# Patient Record
Sex: FEMALE | Race: WHITE | NOT HISPANIC OR LATINO | Employment: OTHER | URBAN - METROPOLITAN AREA
[De-identification: names, ages, dates, MRNs, and addresses within clinical notes are randomized per-mention and may not be internally consistent; named-entity substitution may affect disease eponyms.]

---

## 2017-04-02 ENCOUNTER — HOSPITAL ENCOUNTER (EMERGENCY)
Facility: HOSPITAL | Age: 67
Discharge: HOME | End: 2017-04-03
Attending: EMERGENCY MEDICINE
Payer: MEDICARE

## 2017-04-02 DIAGNOSIS — N39.0 URINARY TRACT INFECTION WITH HEMATURIA, SITE UNSPECIFIED: Primary | ICD-10-CM

## 2017-04-02 DIAGNOSIS — R31.9 URINARY TRACT INFECTION WITH HEMATURIA, SITE UNSPECIFIED: Primary | ICD-10-CM

## 2017-04-02 LAB
BACTERIA #/AREA URNS HPF: ABNORMAL /HPF
BILIRUB UR QL STRIP: NEGATIVE
CLARITY UR: ABNORMAL
COLOR UR: YELLOW
GLUCOSE UR QL STRIP: ABNORMAL
HGB UR QL STRIP: ABNORMAL
HYALINE CASTS #/AREA URNS LPF: 0 /LPF
KETONES UR QL STRIP: NEGATIVE
LEUKOCYTE ESTERASE UR QL STRIP: ABNORMAL
MICROSCOPIC COMMENT: ABNORMAL
NITRITE UR QL STRIP: POSITIVE
PH UR STRIP: 6 [PH] (ref 5–8)
PROT UR QL STRIP: ABNORMAL
RBC #/AREA URNS HPF: 20 /HPF (ref 0–4)
SP GR UR STRIP: 1.02 (ref 1–1.03)
URN SPEC COLLECT METH UR: ABNORMAL
UROBILINOGEN UR STRIP-ACNC: 1 EU/DL
WBC #/AREA URNS HPF: >100 /HPF (ref 0–5)

## 2017-04-02 PROCEDURE — 96372 THER/PROPH/DIAG INJ SC/IM: CPT

## 2017-04-02 PROCEDURE — 81000 URINALYSIS NONAUTO W/SCOPE: CPT

## 2017-04-02 PROCEDURE — 99283 EMERGENCY DEPT VISIT LOW MDM: CPT | Mod: 25

## 2017-04-02 NOTE — ED AVS SNAPSHOT
[facility] -   [address] [facility] [address]               Violeta Scherer   2017 11:20 PM   ED    Description:  Female : 1950   Department:  [facility] -            Your Care was Coordinated By:     Provider Role From To    Davis Bahena Jr., MD Attending Provider [id number] --      Reason for Visit     Dysuria           Diagnoses this Visit        Comments    Urinary tract infection with hematuria, site unspecified    -  Primary       ED Disposition     ED Disposition Condition Comment    Discharge             To Do List           Follow-up Information     Follow up with Karrie Birmingham MD. Call in 2 days.    Specialty:  Family Medicine    Contact information:    [address]  [facility] [address] [phone]         These Medications        Disp Refills Start End    ciprofloxacin HCl (CIPRO) 500 MG tablet 20 tablet 0 4/3/2017 2017    Take 1 tablet (500 mg total) by mouth 2 (two) times daily. - Oral      [facility]     [facility] -  Assistance  Unless otherwise directed by your provider, please contact [facility], our nurse care line that is available for  assistance.     Registered nurses in the [facility] provide: appointment scheduling, clinical advisement, health education, and other advisory services.  Call: [phone] (toll free)               Medications           Message regarding Medications     Verify the changes and/or additions to your medication regime listed below are the same as discussed with your clinician today.  If any of these changes or additions are incorrect, please notify your healthcare provider.        START taking these NEW medications        Refills    ciprofloxacin HCl (CIPRO) 500 MG tablet 0    Sig: Take 1 tablet (500 mg total) by mouth 2 (two) times daily.    Class: Print    Route: Oral      These medications were administered  "today        Dose Freq    cefTRIAXone injection 1 g 1 g ED 1 Time    Sig: Inject 1 g into the muscle ED 1 Time.    Class: Normal    Route: Intramuscular    lidocaine HCL 10 mg/ml (1%) injection 1 mL 1 mL ED 1 Time    Sig: Inject 1 mL into the muscle ED 1 Time.    Class: Normal    Route: Intramuscular           Verify that the below list of medications is an accurate representation of the medications you are currently taking.  If none reported, the list may be blank. If incorrect, please contact your healthcare provider. Carry this list with you in case of emergency.           Current Medications     cefTRIAXone injection 1 g Inject 1 g into the muscle ED 1 Time.    ciprofloxacin HCl (CIPRO) 500 MG tablet Take 1 tablet (500 mg total) by mouth 2 (two) times daily.    lidocaine HCL 10 mg/ml (1%) injection 1 mL Inject 1 mL into the muscle ED 1 Time.           Clinical Reference Information           Your Vitals Were     BP Pulse Temp Resp Height Weight    143/70 (BP Location: Right arm, Patient Position: Sitting) 82 98.4 °F (36.9 °C) 18 5' 5" (1.651 m) 57.6 kg (127 lb)    SpO2 BMI             96% 21.13 kg/m2         Allergies as of 4/3/2017        Reactions    Bactrim [Sulfamethoxazole-trimethoprim] Hives      Immunizations Administered on Date of Encounter - 4/3/2017     None      ED Micro, Lab, POCT     Start Ordered       Status Ordering Provider    04/02/17 2327 04/02/17 2326  Urinalysis  STAT      Final result     04/02/17 2326 04/02/17 2326  Urinalysis Microscopic  Once      Final result       ED Imaging Orders     None        Discharge Instructions         Bladder Infection, Female (Adult)    Urine is normally doesn't have any bacteria in it. But bacteria can get into the urinary tract from the skin around the rectum. Or they can travel in the blood from elsewhere in the body. Once they are in your urinary tract, they can cause infection in the urethra (urethritis), the bladder (cystitis), or the kidneys " "(pyelonephritis).  The most common place for an infection is in the bladder. This is called a bladder infection. This is one of the most common infections in women. Most bladder infections are easily treated. They are not serious unless the infection spreads to the kidney.  The phrases "bladder infection," "UTI," and "cystitis" are often used to describe the same thing. But they are not always the same. Cystitis is an inflammation of the bladder. The most common cause of cystitis is an infection.  Symptoms  The infection causes inflammation in the urethra and bladder. This causes many of the symptoms. The most common symptoms of a bladder infection are:  · Pain or burning when urinating  · Having to urinate more often than usual  · Urgent need to urinate  · Only a small amount of urine comes out  · Blood in urine  · Abdominal discomfort. This is usually in the lower abdomen above the pubic bone.  · Cloudy urine  · Strong- or bad-smelling urine  · Unable to urinate (urinary retention)  · Unable to hold urine in (urinary incontinence)  · Fever  · Loss of appetite  · Confusion (in older adults)  Causes  Bladder infections are not contagious. You can't get one from someone else, from a toilet seat, or from sharing a bath.  The most common cause of bladder infections is bacteria from the bowels. The bacteria get onto the skin around the opening of the urethra. From there, they can get into the urine and travel up to the bladder, causing inflammation and infection. This usually happens because of:  · Wiping improperly after urinating. Always wipe from front to back.  · Bowel incontinence  · Pregnancy  · Procedures such as having a catheter inserted  · Older age  · Not emptying your bladder. This can allow bacteria a chance to grow in your urine.  · Dehydration  · Constipation  · Sex  · Use of a diaphragm for birth control   Treatment  Bladder infections are diagnosed by a urine test. They are treated with antibiotics and " usually clear up quickly without complications. Treatment helps prevent a more serious kidney infection.  Medicines  Medicines can help in the treatment of a bladder infection:  · Take antibiotics until they are used up, even if you feel better. It is important to finish them to make sure the infection has cleared.  · You can use acetaminophen or ibuprofen for pain, fever, or discomfort, unless another medicine was prescribed. If you have chronic liver or kidney disease, talk with your healthcare provider before using these medicines. Also talk with your provider if you've ever had a stomach ulcer or gastrointestinal bleeding, or are taking blood-thinner medicines.  · If you are given phenazopydridine to reduce burning with urination, it will cause your urine to become a bright orange color. This can stain clothing.  Care and prevention  These self-care steps can help prevent future infections:  · Drink plenty of fluids to prevent dehydration and flush out your bladder. Do this unless you must restrict fluids for other health reasons, or your doctor told you not to.  · Proper cleaning after going to the bathroom is important. Wipe from front to back after using the toilet to prevent the spread of bacteria.  · Urinate more often. Don't try to hold urine in for a long time.  · Wear loose-fitting clothes and cotton underwear. Avoid tight-fitting pants.  · Improve your diet and prevent constipation. Eat more fresh fruit and vegetables, and fiber, and less junk and fatty foods.  · Avoid sex until your symptoms are gone.  · Avoid caffeine, alcohol, and spicy foods. These can irritate your bladder.  · Urinate right after intercourse to flush out your bladder.  · If you use birth control pills and have frequent bladder infections, discuss it with your doctor.  Follow-up care  Call your healthcare provider if all symptoms are not gone after 3 days of treatment. This is especially important if you have repeat infections.  If  a culture was done, you will be told if your treatment needs to be changed. If directed, you can call to find out the results.  If X-rays were done, you will be told if the results will affect your treatment.  Call 911  Call 911 if any of the following occur:  · Trouble breathing  · Hard to wake up or confusion  · Fainting or loss of consciousness  · Rapid heart rate  When to seek medical advice  Call your healthcare provider right away if any of these occur:  · Fever of 100.4ºF (38.0ºC) or higher, or as directed by your healthcare provider  · Symptoms are not better by the third day of treatment  · Back or belly (abdominal) pain that gets worse  · Repeated vomiting, or unable to keep medicine down  · Weakness or dizziness  · Vaginal discharge  · Pain, redness, or swelling in the outer vaginal area (labia)  Date Last Reviewed: 10/1/2016  © 4257-0334 [employer]. [address] [place] [address]. All rights reserved. This information is not intended as a substitute for professional medical care. Always follow your healthcare professional's instructions.          [facility] Sign-Up     Activating your [facility] account is as easy as 1-2-3!     1) Visit [email or web address], select Sign Up Now, enter this activation code and your date of birth, then select Next.  [email or web address]  Expires: 5/18/2017 12:09 AM      2) Create a username and password to use when you visit [facility] in the future and select a security question in case you lose your password and select Next.    3) Enter your e-mail address and click Sign Up!    Additional Information  If you have questions, please e-mail [email or web address] or call [phone] to talk to our [facility] staff. Remember, [facility] is NOT to be used for urgent needs. For medical emergencies, dial 911.          [facility] complies with applicable Federal civil rights laws and does not discriminate on the basis of race, color, national origin,  age, disability, or sex.        Language Assistance Services     ATTENTION: Language assistance services are available, free of charge. Please call [phone].      ATENCIÓN: Si habla bhupinderañol, tiene a kessler disposición servicios gratuitos de asistencia lingüística. Llame al [phone].     CHÚ Ý: N?u b?n nói Ti?ng Vi?t, có các d?ch v? h? tr? ngôn ng? mi?n phí dành cho b?n. G?i s? [phone].

## 2017-04-03 VITALS
HEIGHT: 65 IN | OXYGEN SATURATION: 96 % | WEIGHT: 127 LBS | RESPIRATION RATE: 16 BRPM | TEMPERATURE: 97 F | DIASTOLIC BLOOD PRESSURE: 84 MMHG | BODY MASS INDEX: 21.16 KG/M2 | HEART RATE: 73 BPM | SYSTOLIC BLOOD PRESSURE: 193 MMHG

## 2017-04-03 PROCEDURE — 25000003 PHARM REV CODE 250: Performed by: EMERGENCY MEDICINE

## 2017-04-03 PROCEDURE — 63600175 PHARM REV CODE 636 W HCPCS: Performed by: EMERGENCY MEDICINE

## 2017-04-03 RX ORDER — NITROFURANTOIN 25; 75 MG/1; MG/1
100 CAPSULE ORAL 2 TIMES DAILY
Qty: 10 CAPSULE | Refills: 0 | Status: SHIPPED | OUTPATIENT
Start: 2017-04-03 | End: 2017-04-08

## 2017-04-03 RX ORDER — LIDOCAINE HYDROCHLORIDE 10 MG/ML
1 INJECTION INFILTRATION; PERINEURAL
Status: COMPLETED | OUTPATIENT
Start: 2017-04-03 | End: 2017-04-03

## 2017-04-03 RX ORDER — CEFTRIAXONE 1 G/1
1 INJECTION, POWDER, FOR SOLUTION INTRAMUSCULAR; INTRAVENOUS
Status: COMPLETED | OUTPATIENT
Start: 2017-04-03 | End: 2017-04-03

## 2017-04-03 RX ORDER — CIPROFLOXACIN 500 MG/1
500 TABLET ORAL 2 TIMES DAILY
Qty: 20 TABLET | Refills: 0 | Status: SHIPPED | OUTPATIENT
Start: 2017-04-03 | End: 2017-04-03

## 2017-04-03 RX ADMIN — LIDOCAINE HYDROCHLORIDE 1 ML: 10 INJECTION, SOLUTION INFILTRATION; PERINEURAL at 12:04

## 2017-04-03 RX ADMIN — CEFTRIAXONE SODIUM 1 G: 1 INJECTION, POWDER, FOR SOLUTION INTRAMUSCULAR; INTRAVENOUS at 12:04

## 2017-04-03 NOTE — DISCHARGE INSTRUCTIONS

## 2017-04-03 NOTE — ED PROVIDER NOTES
"SCRIBE #1 NOTE: I, Roberto Lobo, am scribing for, and in the presence of, Davis Bahena Jr., MD. I have scribed the entire note.      History      Chief Complaint   Patient presents with    Dysuria     burning with urination, c/o pressure       Review of patient's allergies indicates:  No Known Allergies     HPI   HPI    4/2/2017, 11:34 PM   History obtained from the patient      History of Present Illness: Violeta Scherer is a 66 y.o. female patient who presents to the Emergency Department for an evaluation of dysuria which onset suddenly today. Symptoms are intermittent and moderate in severity. Exacerbated by nothing and relieved by nothing. Pt reports she does not suffer from pain with urination, but instead a "pressure." Patient denies any fever, chills, diaphoresis, SOB, cough, CP, abd pain, nausea, vomiting, diarrhea, hematuria, decreased urination, difficulty urinating, HA, weakness, back pain, neck pain, flank pain, urinary urgency/frequency, and all other sxs at this time. No further complaints or concerns at this time.     Arrival mode: Personal vehicle    PCP: Karrie Birmingham MD     Past Medical History:  Past medical history reviewed not relevant      Past Surgical History:  Past surgical history reviewed not relevant      Family History:  Family history reviewed not relevant      Social History:  Social History    Social History Main Topics    Social History Main Topics    Smoking status: Unknown if ever smoked    Smokeless tobacco: Unknown if ever used    Alcohol Use: Unknown drinking history    Drug Use: Unknown if ever used    Sexual Activity: Unknown       ROS   Review of Systems   Constitutional: Negative for chills, diaphoresis and fever.   HENT: Negative for sore throat.    Respiratory: Negative for cough and shortness of breath.    Cardiovascular: Negative for chest pain.   Gastrointestinal: Negative for abdominal pain, diarrhea, nausea and vomiting.   Genitourinary: Positive for dysuria " "(Pressure). Negative for decreased urine volume, difficulty urinating, flank pain, frequency, hematuria and urgency.   Musculoskeletal: Negative for back pain and neck pain.   Skin: Negative for rash.   Neurological: Negative for weakness and headaches.   Hematological: Does not bruise/bleed easily.     Physical Exam    Initial Vitals   BP Pulse Resp Temp SpO2   04/02/17 2308 04/02/17 2308 04/02/17 2308 04/02/17 2308 04/02/17 2308   143/70 82 18 98.4 °F (36.9 °C) 96 %      Physical Exam  Nursing Notes and Vital Signs Reviewed.  Constitutional: Patient is in no acute distress. Awake and alert. Well-developed and well-nourished.  Head: Atraumatic. Normocephalic.  Eyes: PERRL. EOM intact. Conjunctivae are not pale. No scleral icterus.  ENT: Mucous membranes are moist. Oropharynx is clear and symmetric.    Neck: Supple. Full ROM. No lymphadenopathy.  Cardiovascular: Regular rate. Regular rhythm.  Pulmonary/Chest: No respiratory distress. Clear to auscultation bilaterally. No wheezing, rales, or rhonchi.  Abdominal: Soft and non-distended.  Suprapubic tenderness.  No pain to palpation noted. No rebound, guarding, or rigidity.  Musculoskeletal: Moves all extremities. No obvious deformities.  Skin: Warm and dry.  Neurological:  Alert, awake, and appropriate.  Normal speech.  No acute focal neurological deficits are appreciated.  Psychiatric: Normal affect. Good eye contact. Appropriate in content.    ED Course    Procedures  ED Vital Signs:  Vitals:    04/02/17 2308 04/03/17 0059   BP: (!) 143/70 (!) 193/84   Pulse: 82 73   Resp: 18 16   Temp: 98.4 °F (36.9 °C) 97.2 °F (36.2 °C)   TempSrc:  Oral   SpO2: 96%    Weight: 57.6 kg (127 lb)    Height: 5' 5" (1.651 m)        Abnormal Lab Results:  Labs Reviewed   URINALYSIS - Abnormal; Notable for the following:        Result Value    Appearance, UA Cloudy (*)     Protein, UA 2+ (*)     Glucose, UA Trace (*)     Occult Blood UA 2+ (*)     Nitrite, UA Positive (*)     Leukocytes, " UA 3+ (*)     All other components within normal limits   URINALYSIS MICROSCOPIC - Abnormal; Notable for the following:     RBC, UA 20 (*)     WBC, UA >100 (*)     Bacteria, UA Many (*)     All other components within normal limits        All Lab Results:  Results for orders placed or performed during the hospital encounter of 04/02/17   Urinalysis   Result Value Ref Range    Specimen UA Urine, Clean Catch     Color, UA Yellow Yellow, Straw, [place]    Appearance, UA Cloudy (A) Clear    pH, UA 6.0 5.0 - 8.0    Specific Gravity, UA 1.020 1.005 - 1.030    Protein, UA 2+ (A) Negative    Glucose, UA Trace (A) Negative    Ketones, UA Negative Negative    Bilirubin (UA) Negative Negative    Occult Blood UA 2+ (A) Negative    Nitrite, UA Positive (A) Negative    Urobilinogen, UA 1.0 <2.0 EU/dL    Leukocytes, UA 3+ (A) Negative   Urinalysis Microscopic   Result Value Ref Range    RBC, UA 20 (H) 0 - 4 /hpf    WBC, UA >100 (H) 0 - 5 /hpf    Bacteria, UA Many (A) None-Occ /hpf    Hyaline Casts, UA 0 0-1/lpf /lpf    Microscopic Comment SEE COMMENT                   The Emergency Provider reviewed the vital signs and test results, which are outlined above.    ED Discussion      12:19 PM: Reassessed pt at this time. Pt is awake, alert, and in NAD. Pt states her condition has improved at this time. Discussed with pt all pertinent ED information and results. Discussed pt dx of urinary tract infection with hematuria and plan of tx. Gave pt all f/u and return to the ED instructions. All questions and concerns were addressed at this time. Pt expresses understanding of information and instructions, and is comfortable with plan to discharge. Pt is stable for discharge.    I discussed with patient and/or family/caretaker that evaluation in the ED does not suggest any emergent or life threatening medical conditions requiring immediate intervention beyond what was provided in the ED, and I believe patient is safe for discharge.  Regardless,  an unremarkable evaluation in the ED does not preclude the development or presence of a serious of life threatening condition. As such, patient was instructed to return immediately for any worsening or change in current symptoms.    Pre-hypertension/Hypertension: The pt has been informed that they may have pre-hypertension or hypertension based on a blood pressure reading in the ED. I recommend that the pt call the PCP listed on their discharge instructions or a physician of their choice this week to arrange f/u for further evaluation of possible pre-hypertension or hypertension.       ED Medication(s):  Medications   cefTRIAXone injection 1 g (1 g Intramuscular Given 4/3/17 0030)   lidocaine HCL 10 mg/ml (1%) injection 1 mL (1 mL Intramuscular Given 4/3/17 0030)       Discharge Medication List as of 4/3/2017 12:09 AM      START taking these medications    Details   ciprofloxacin HCl (CIPRO) 500 MG tablet Take 1 tablet (500 mg total) by mouth 2 (two) times daily., Starting 4/3/2017, Until u 4/13/17, Print             Follow-up Information     Follow up with Karrie Birmingham MD. Call in 2 days.    Specialty:  Family Medicine    Contact information:    [facility]  [place] [address]  [phone]              Medical Decision Making    Medical Decision Making:   Clinical Tests:   Lab Tests: Ordered and Reviewed           Scribe Attestation:   Scribe #1: I performed the above scribed service and the documentation accurately describes the services I performed. I attest to the accuracy of the note.    Attending:   Physician Attestation Statement for Scribe #1: I, Davis Bahena Jr., MD, personally performed the services described in this documentation, as scribed by Roberto Lobo, in my presence, and it is both accurate and complete.          Clinical Impression       ICD-10-CM ICD-9-CM   1. Urinary tract infection with hematuria, site unspecified N39.0 599.0    R31.9        Disposition:   Disposition:  Discharged  Condition: Stable         Davis Bahena Jr., MD  04/03/17 0419

## 2018-11-24 ENCOUNTER — HOSPITAL ENCOUNTER (EMERGENCY)
Facility: HOSPITAL | Age: 68
Discharge: HOME | End: 2018-11-24
Attending: EMERGENCY MEDICINE
Payer: MEDICARE

## 2018-11-24 VITALS
SYSTOLIC BLOOD PRESSURE: 144 MMHG | WEIGHT: 152 LBS | TEMPERATURE: 98 F | BODY MASS INDEX: 25.29 KG/M2 | DIASTOLIC BLOOD PRESSURE: 75 MMHG | OXYGEN SATURATION: 94 % | HEART RATE: 76 BPM | RESPIRATION RATE: 20 BRPM

## 2018-11-24 DIAGNOSIS — S32.000A LUMBAR COMPRESSION FRACTURE, CLOSED, INITIAL ENCOUNTER: Primary | ICD-10-CM

## 2018-11-24 DIAGNOSIS — R10.2 PELVIC PAIN: ICD-10-CM

## 2018-11-24 DIAGNOSIS — M54.9 BACK PAIN: ICD-10-CM

## 2018-11-24 PROCEDURE — 99284 EMERGENCY DEPT VISIT MOD MDM: CPT | Mod: 25

## 2018-11-24 PROCEDURE — 63600175 PHARM REV CODE 636 W HCPCS: Performed by: NURSE PRACTITIONER

## 2018-11-24 PROCEDURE — 25000003 PHARM REV CODE 250: Performed by: NURSE PRACTITIONER

## 2018-11-24 PROCEDURE — 96372 THER/PROPH/DIAG INJ SC/IM: CPT

## 2018-11-24 RX ORDER — MORPHINE SULFATE 4 MG/ML
6 INJECTION, SOLUTION INTRAMUSCULAR; INTRAVENOUS
Status: COMPLETED | OUTPATIENT
Start: 2018-11-24 | End: 2018-11-24

## 2018-11-24 RX ORDER — ONDANSETRON 4 MG/1
4 TABLET, FILM COATED ORAL EVERY 6 HOURS
Qty: 12 TABLET | Refills: 0 | Status: SHIPPED | OUTPATIENT
Start: 2018-11-24

## 2018-11-24 RX ORDER — HYDROCODONE BITARTRATE AND ACETAMINOPHEN 7.5; 325 MG/1; MG/1
1 TABLET ORAL EVERY 6 HOURS PRN
Qty: 14 TABLET | Refills: 0 | Status: SHIPPED | OUTPATIENT
Start: 2018-11-24

## 2018-11-24 RX ORDER — ONDANSETRON 8 MG/1
8 TABLET, ORALLY DISINTEGRATING ORAL
Status: COMPLETED | OUTPATIENT
Start: 2018-11-24 | End: 2018-11-24

## 2018-11-24 RX ORDER — DOCUSATE SODIUM 100 MG/1
100 CAPSULE, LIQUID FILLED ORAL 2 TIMES DAILY PRN
Qty: 30 CAPSULE | Refills: 0 | Status: SHIPPED | OUTPATIENT
Start: 2018-11-24

## 2018-11-24 RX ORDER — PANTOPRAZOLE SODIUM 40 MG/1
40 TABLET, DELAYED RELEASE ORAL 2 TIMES DAILY
COMMUNITY

## 2018-11-24 RX ADMIN — MORPHINE SULFATE 6 MG: 4 INJECTION, SOLUTION INTRAMUSCULAR; INTRAVENOUS at 11:11

## 2018-11-24 RX ADMIN — ONDANSETRON 8 MG: 8 TABLET, ORALLY DISINTEGRATING ORAL at 11:11

## 2018-11-24 NOTE — ED PROVIDER NOTES
Encounter Date: 2018       History     Chief Complaint   Patient presents with    Fall     fell approx 2 feet while standing on crate in closet, shortly PTA; c/o right hip pain; takes eliquis, denies hitting head     68 year old female with complaint of lower back pain with radiation into both buttocks since fall just prior to arrival.  Reports was standing on a 2 foot crate and fell and landed on buttock. Moderate pain. Worse with movement and walking. No other injury reported.            Review of patient's allergies indicates:   Allergen Reactions    Bactrim [sulfamethoxazole-trimethoprim] Hives     Past Medical History:   Diagnosis Date    Hypertension     Stroke     TIA     Past Surgical History:   Procedure Laterality Date    TONSILLECTOMY       History reviewed. No pertinent family history.  Social History     Tobacco Use    Smoking status: Former Smoker     Packs/day: 3.00     Years: 35.00     Pack years: 105.00     Types: Cigarettes     Last attempt to quit: 2000     Years since quittin.9   Substance Use Topics    Alcohol use: Not on file    Drug use: No     Review of Systems   Constitutional: Negative for fever.   HENT: Negative for sore throat.    Respiratory: Negative for shortness of breath.    Cardiovascular: Negative for chest pain.   Gastrointestinal: Negative for nausea.   Genitourinary: Negative for dysuria.   Musculoskeletal: Positive for back pain.        Hip pain    Skin: Negative for rash.   Neurological: Negative for weakness.   Hematological: Does not bruise/bleed easily.       Physical Exam     Initial Vitals [18 1056]   BP Pulse Resp Temp SpO2   136/75 88 18 98.1 °F (36.7 °C) (!) 91 %      MAP       --         Physical Exam    Nursing note and vitals reviewed.  Constitutional: She appears well-developed and well-nourished.   HENT:   Head: Normocephalic and atraumatic.   Eyes: Conjunctivae and EOM are normal. Pupils are equal, round, and reactive to light.   Neck:  Normal range of motion. Neck supple.   Cardiovascular: Normal rate, regular rhythm, normal heart sounds and intact distal pulses.   Pulmonary/Chest: Breath sounds normal.   Abdominal: Soft. There is no tenderness. There is no rebound and no guarding.   Musculoskeletal: Normal range of motion.   Diffuse para lumbar tenderness, no hip tenderness, tenderness bilateral buttock area, no thoracic or cervical spine tenderness   Neurological: She is alert and oriented to person, place, and time. She has normal strength and normal reflexes.   Skin: Skin is warm and dry.   Psychiatric: She has a normal mood and affect. Her behavior is normal. Thought content normal.         ED Course   Procedures  Labs Reviewed - No data to display       Imaging Results          CT Lumbar Spine Without Contrast (Final result)  Result time 11/24/18 13:48:18    Final result by Elin Alvarez MD (11/24/18 13:48:18)                 Impression:      Acute compression fracture of the L3 vertebral body, as above, with approximately 40% height loss.  No evidence of osseous retropulsion into the spinal canal.    All CT scans at this facility use dose modulation, iterative reconstruction and/or weight based dosing when appropriate to reduce radiation dose to as low as reasonably achievable.      Electronically signed by: Elin Alvarez MD  Date:    11/24/2018  Time:    13:48             Narrative:    EXAMINATION:  CT LUMBAR SPINE WITHOUT CONTRAST    CLINICAL HISTORY:  Low back pain, minor trauma;    TECHNIQUE:  Axial CT imaging was performed through the lumbar spine without intravenous contrast. Multiplanar reformats were reviewed and interpreted.    COMPARISON:  None    FINDINGS:  There is an acute compression fraction of the L3 vertebral body with anterior cortical disruption and approximately 40% vertebral body height loss.  There is no evidence of retropulsion.  Spinal canal is patent.Alignment is normal.  Disc spaces are normal.                                X-Ray Pelvis Routine AP (Final result)  Result time 11/24/18 11:56:47    Final result by Tres Coronado III, MD (11/24/18 11:56:47)                 Impression:      No acute pelvic abnormality suggested.      Electronically signed by: Tres Coronado MD  Date:    11/24/2018  Time:    11:56             Narrative:    EXAMINATION:  XR PELVIS ROUTINE AP    CLINICAL HISTORY:  Pelvic and perineal pain    FINDINGS:  Bony pelvis is intact without fracture or diastasis.  No lytic or blastic change.                               X-Ray Lumbar Spine Ap And Lateral (Final result)  Result time 11/24/18 11:49:40    Final result by Tres Coronado III, MD (11/24/18 11:49:40)                 Impression:      1.  Osteopenia with minimal arthritic change.  Two.  Compression fracture superior endplate of L3 of approximately 20-30%, age indeterminate.  Consider follow-up studies as indicated.      Electronically signed by: Tres Coronado MD  Date:    11/24/2018  Time:    11:49             Narrative:    EXAMINATION:  XR LUMBAR SPINE AP AND LATERAL    CLINICAL HISTORY:  Dorsalgia, unspecifiedLow back pain, minor trauma;fall;    FINDINGS:  There is mild osteopenia.  Minimal arthritic lipping primarily at L4 and L5.  There is a compression fracture involving the superior endplate of L3, age indeterminate.  The appearance would suggest that this is most likely older in nature but plain films are indeterminate.  Vascular calcifications are noted.  If further detail related, might consider correlation with an MRI.  This could determine whether the changes are chronic or acute in nature.                               12:34 PM  Case discussed with Dr. Whaley.  Dr. Whaley requested CT of lumbar spine and out patient follow up on Tuesday at 10:30 am      1:54 PM  Pt's pain controlled.  Pt has follow up appointment with Dr. Whaley on Tuesday at 10:30 am         Imaging Results          CT Lumbar Spine  Without Contrast (Final result)  Result time 11/24/18 13:48:18    Final result by Elin Alvarez MD (11/24/18 13:48:18)                 Impression:      Acute compression fracture of the L3 vertebral body, as above, with approximately 40% height loss.  No evidence of osseous retropulsion into the spinal canal.    All CT scans at this facility use dose modulation, iterative reconstruction and/or weight based dosing when appropriate to reduce radiation dose to as low as reasonably achievable.      Electronically signed by: Elin Alvarez MD  Date:    11/24/2018  Time:    13:48             Narrative:    EXAMINATION:  CT LUMBAR SPINE WITHOUT CONTRAST    CLINICAL HISTORY:  Low back pain, minor trauma;    TECHNIQUE:  Axial CT imaging was performed through the lumbar spine without intravenous contrast. Multiplanar reformats were reviewed and interpreted.    COMPARISON:  None    FINDINGS:  There is an acute compression fraction of the L3 vertebral body with anterior cortical disruption and approximately 40% vertebral body height loss.  There is no evidence of retropulsion.  Spinal canal is patent.Alignment is normal.  Disc spaces are normal.                               X-Ray Pelvis Routine AP (Final result)  Result time 11/24/18 11:56:47    Final result by Tres Coronado III, MD (11/24/18 11:56:47)                 Impression:      No acute pelvic abnormality suggested.      Electronically signed by: Tres Coronado MD  Date:    11/24/2018  Time:    11:56             Narrative:    EXAMINATION:  XR PELVIS ROUTINE AP    CLINICAL HISTORY:  Pelvic and perineal pain    FINDINGS:  Bony pelvis is intact without fracture or diastasis.  No lytic or blastic change.                               X-Ray Lumbar Spine Ap And Lateral (Final result)  Result time 11/24/18 11:49:40    Final result by Tres Coronado III, MD (11/24/18 11:49:40)                 Impression:      1.  Osteopenia with minimal arthritic  change.  Two.  Compression fracture superior endplate of L3 of approximately 20-30%, age indeterminate.  Consider follow-up studies as indicated.      Electronically signed by: Tres Coronado MD  Date:    11/24/2018  Time:    11:49             Narrative:    EXAMINATION:  XR LUMBAR SPINE AP AND LATERAL    CLINICAL HISTORY:  Dorsalgia, unspecifiedLow back pain, minor trauma;fall;    FINDINGS:  There is mild osteopenia.  Minimal arthritic lipping primarily at L4 and L5.  There is a compression fracture involving the superior endplate of L3, age indeterminate.  The appearance would suggest that this is most likely older in nature but plain films are indeterminate.  Vascular calcifications are noted.  If further detail related, might consider correlation with an MRI.  This could determine whether the changes are chronic or acute in nature.                                            Clinical Impression:   The primary encounter diagnosis was Lumbar compression fracture, closed, initial encounter. Diagnoses of Back pain and Pelvic pain were also pertinent to this visit.                             Ruben Hoffman NP  11/24/18 [id number]

## 2018-11-27 ENCOUNTER — OFFICE VISIT (OUTPATIENT)
Dept: NEUROSURGERY | Facility: CLINIC | Age: 68
End: 2018-11-27
Payer: MEDICARE

## 2018-11-27 VITALS
BODY MASS INDEX: 28.43 KG/M2 | DIASTOLIC BLOOD PRESSURE: 70 MMHG | WEIGHT: 170.63 LBS | HEART RATE: 78 BPM | SYSTOLIC BLOOD PRESSURE: 112 MMHG | HEIGHT: 65 IN

## 2018-11-27 DIAGNOSIS — S32.030A CLOSED COMPRESSION FRACTURE OF THIRD LUMBAR VERTEBRA, INITIAL ENCOUNTER: Primary | ICD-10-CM

## 2018-11-27 PROCEDURE — 99999 PR PBB SHADOW E&M-EST. PATIENT-LVL III: CPT | Mod: PBBFAC,,, | Performed by: NEUROLOGICAL SURGERY

## 2018-11-27 PROCEDURE — 99204 OFFICE O/P NEW MOD 45 MIN: CPT | Mod: S$GLB,,, | Performed by: NEUROLOGICAL SURGERY

## 2019-02-21 ENCOUNTER — TELEPHONE (OUTPATIENT)
Dept: NEUROSURGERY | Facility: CLINIC | Age: 69
End: 2019-02-21

## 2019-02-21 NOTE — TELEPHONE ENCOUNTER
Spoke with Sherley, informed her Dr. Whaley's schedule will be closed the morning of 2/22 d/t an appt, but his schedule will be open the afternoon on 2/22, asked if she'd like to reschedule appt for later that day or another day, stated she'd like to reschedule for the middle of March, they aren't in the area and wouldn't have been able to make the appt any way, pt is reschedule for 3/20/19 0930 xrays and 10 appt//ns

## 2021-09-07 ENCOUNTER — OFFICE VISIT (OUTPATIENT)
Dept: URGENT CARE | Facility: CLINIC | Age: 71
End: 2021-09-07
Payer: MEDICARE

## 2021-09-07 VITALS
BODY MASS INDEX: 26.08 KG/M2 | OXYGEN SATURATION: 95 % | HEIGHT: 65 IN | HEART RATE: 78 BPM | TEMPERATURE: 98 F | RESPIRATION RATE: 18 BRPM | WEIGHT: 156.5 LBS | DIASTOLIC BLOOD PRESSURE: 74 MMHG | SYSTOLIC BLOOD PRESSURE: 176 MMHG

## 2021-09-07 DIAGNOSIS — R09.81 NASAL CONGESTION: Primary | ICD-10-CM

## 2021-09-07 LAB
CTP QC/QA: YES
SARS-COV-2 RDRP RESP QL NAA+PROBE: NEGATIVE

## 2021-09-07 PROCEDURE — 99999 PR PBB SHADOW E&M-EST. PATIENT-LVL III: CPT | Mod: PBBFAC,,, | Performed by: NURSE PRACTITIONER

## 2021-09-07 PROCEDURE — 99203 OFFICE O/P NEW LOW 30 MIN: CPT | Mod: S$GLB,,, | Performed by: NURSE PRACTITIONER

## 2021-09-07 PROCEDURE — 3078F PR MOST RECENT DIASTOLIC BLOOD PRESSURE < 80 MM HG: ICD-10-PCS | Mod: CPTII,S$GLB,, | Performed by: NURSE PRACTITIONER

## 2021-09-07 PROCEDURE — 3077F PR MOST RECENT SYSTOLIC BLOOD PRESSURE >= 140 MM HG: ICD-10-PCS | Mod: CPTII,S$GLB,, | Performed by: NURSE PRACTITIONER

## 2021-09-07 PROCEDURE — 99203 PR OFFICE/OUTPT VISIT, NEW, LEVL III, 30-44 MIN: ICD-10-PCS | Mod: S$GLB,,, | Performed by: NURSE PRACTITIONER

## 2021-09-07 PROCEDURE — 3008F BODY MASS INDEX DOCD: CPT | Mod: CPTII,S$GLB,, | Performed by: NURSE PRACTITIONER

## 2021-09-07 PROCEDURE — U0002: ICD-10-PCS | Mod: QW,S$GLB,, | Performed by: NURSE PRACTITIONER

## 2021-09-07 PROCEDURE — U0002 COVID-19 LAB TEST NON-CDC: HCPCS | Mod: QW,S$GLB,, | Performed by: NURSE PRACTITIONER

## 2021-09-07 PROCEDURE — 3078F DIAST BP <80 MM HG: CPT | Mod: CPTII,S$GLB,, | Performed by: NURSE PRACTITIONER

## 2021-09-07 PROCEDURE — 99999 PR PBB SHADOW E&M-EST. PATIENT-LVL III: ICD-10-PCS | Mod: PBBFAC,,, | Performed by: NURSE PRACTITIONER

## 2021-09-07 PROCEDURE — 3008F PR BODY MASS INDEX (BMI) DOCUMENTED: ICD-10-PCS | Mod: CPTII,S$GLB,, | Performed by: NURSE PRACTITIONER

## 2021-09-07 PROCEDURE — 3077F SYST BP >= 140 MM HG: CPT | Mod: CPTII,S$GLB,, | Performed by: NURSE PRACTITIONER

## 2022-01-04 NOTE — PROGRESS NOTES
Subjective:       Patient ID: Violeta Scherer is a 68 y.o. female.    Chief Complaint: Lumbar Spine Pain (L-Spine) (Hospital f/u )    The patient is seen today for evaluation of lower back pain secondary to compression fracture of L3. She is an emergency department follow-up. She fell two feet while standing on a crate in her closet on 11/24/18. The patient was seen in the ER on the same day and recommended to have CT of her lumbar spine and follow-up in our clinic after this study.      Extremity Pain    The pain is present in the back. This is a new problem. The current episode started in the past 7 days. There has been a history of trauma. The pain is at a severity of 0/10. The pain is mild. Pertinent negatives include no fever, inability to bear weight, numbness or tingling. The symptoms are aggravated by lying down. She has tried acetaminophen for the symptoms. The treatment provided mild relief.     Review of Systems   Constitutional: Negative for chills and fever.   HENT: Negative for congestion.    Respiratory: Negative for cough, choking and wheezing.    Cardiovascular: Negative for chest pain.   Gastrointestinal: Negative for diarrhea, nausea and vomiting.   Genitourinary: Negative for difficulty urinating.   Musculoskeletal: Positive for back pain.   Skin: Positive for color change (ecchymosis).   Neurological: Negative for tingling and numbness.   Psychiatric/Behavioral: Negative for behavioral problems.       Objective:      Physical Exam:    Constitutional: She appears well-developed and well-nourished. No distress.     Eyes: Pupils are equal, round, and reactive to light. EOM are normal.     Cardiovascular: Normal rate.     Abdominal: Soft.     Skin: Skin displays no rash on trunk.     Psych/Behavior: She is alert. She is oriented to person, place, and time. She has a normal mood and affect.     Musculoskeletal:        Neck: Range of motion is full. Muscle strength is 5/5. Tone is normal.        Back:  Range of motion is full. There is tenderness. Muscle strength is 5/5. Tone is normal.        Right Upper Extremities: There is no tenderness. Tone is normal.        Left Upper Extremities: There is no tenderness. Tone is normal.       Right Lower Extremities: There is no tenderness.        Left Lower Extremities: There is no tenderness.     Neurological:        Sensory: There is no sensory deficit in the trunk. There is no sensory deficit in the extremities.        DTRs: DTRs are normal.        Cranial nerves: Cranial nerve(s) II, III, IV, V, VI, VII, VIII, IX, X, XI and XII are intact.           IMAGING:  EXAMINATION:  CT LUMBAR SPINE WITHOUT CONTRAST    CLINICAL HISTORY:  Low back pain, minor trauma;    TECHNIQUE:  Axial CT imaging was performed through the lumbar spine without intravenous contrast. Multiplanar reformats were reviewed and interpreted.    COMPARISON:  None    FINDINGS:  There is an acute compression fraction of the L3 vertebral body with anterior cortical disruption and approximately 40% vertebral body height loss.  There is no evidence of retropulsion.  Spinal canal is patent.Alignment is normal.  Disc spaces are normal.      Impression       Acute compression fracture of the L3 vertebral body, as above, with approximately 40% height loss.  No evidence of osseous retropulsion into the spinal canal.    All CT scans at this facility use dose modulation, iterative reconstruction and/or weight based dosing when appropriate to reduce radiation dose to as low as reasonably achievable.       Assessment:       1. Closed compression fracture of third lumbar vertebra, initial encounter        Plan:       Closed compression fracture of third lumbar vertebra, initial encounter      Patient was advised to utilize her walker and to wear a back brace. Our office will place an order for a LSO brace. If her pain worsens, she will inform our clinic and discuss possibility of kyphoplasty in the near future.    She  will RTC in 6 weeks and have repeat x-rays of her lumbar spine.     She is scheduled to go on a cruise next week and may need assistance getting around but other than this she is okay to attend.   No

## 2023-01-19 ENCOUNTER — HOSPITAL ENCOUNTER (INPATIENT)
Facility: HOSPITAL | Age: 73
LOS: 5 days | Discharge: HOME | DRG: 280 | End: 2023-01-24
Attending: FAMILY MEDICINE | Admitting: HOSPITALIST
Payer: MEDICARE

## 2023-01-19 DIAGNOSIS — R06.02 SOB (SHORTNESS OF BREATH): ICD-10-CM

## 2023-01-19 DIAGNOSIS — I21.4 NSTEMI (NON-ST ELEVATED MYOCARDIAL INFARCTION): Primary | ICD-10-CM

## 2023-01-19 DIAGNOSIS — I50.9 NEW ONSET OF CONGESTIVE HEART FAILURE: ICD-10-CM

## 2023-01-19 DIAGNOSIS — R07.9 CHEST PAIN: ICD-10-CM

## 2023-01-19 LAB
ALBUMIN SERPL BCP-MCNC: 3.3 G/DL (ref 3.5–5.2)
ALP SERPL-CCNC: 108 U/L (ref 55–135)
ALT SERPL W/O P-5'-P-CCNC: 19 U/L (ref 10–44)
ANION GAP SERPL CALC-SCNC: 13 MMOL/L (ref 8–16)
APTT BLDCRRT: 26.1 SEC (ref 21–32)
AST SERPL-CCNC: 20 U/L (ref 10–40)
BASOPHILS # BLD AUTO: 0.03 K/UL (ref 0–0.2)
BASOPHILS NFR BLD: 0.4 % (ref 0–1.9)
BILIRUB SERPL-MCNC: 0.7 MG/DL (ref 0.1–1)
BNP SERPL-MCNC: 1940 PG/ML (ref 0–99)
BUN SERPL-MCNC: 21 MG/DL (ref 8–23)
CALCIUM SERPL-MCNC: 9.3 MG/DL (ref 8.7–10.5)
CHLORIDE SERPL-SCNC: 104 MMOL/L (ref 95–110)
CO2 SERPL-SCNC: 23 MMOL/L (ref 23–29)
CREAT SERPL-MCNC: 1 MG/DL (ref 0.5–1.4)
DIFFERENTIAL METHOD: ABNORMAL
EOSINOPHIL # BLD AUTO: 0 K/UL (ref 0–0.5)
EOSINOPHIL NFR BLD: 0.1 % (ref 0–8)
ERYTHROCYTE [DISTWIDTH] IN BLOOD BY AUTOMATED COUNT: 15.3 % (ref 11.5–14.5)
EST. GFR  (NO RACE VARIABLE): >60 ML/MIN/1.73 M^2
GLUCOSE SERPL-MCNC: 123 MG/DL (ref 70–110)
HCT VFR BLD AUTO: 35.5 % (ref 37–48.5)
HGB BLD-MCNC: 11.2 G/DL (ref 12–16)
IMM GRANULOCYTES # BLD AUTO: 0.02 K/UL (ref 0–0.04)
IMM GRANULOCYTES NFR BLD AUTO: 0.2 % (ref 0–0.5)
INR PPP: 1.1 (ref 0.8–1.2)
LYMPHOCYTES # BLD AUTO: 1.8 K/UL (ref 1–4.8)
LYMPHOCYTES NFR BLD: 21.6 % (ref 18–48)
MCH RBC QN AUTO: 26.7 PG (ref 27–31)
MCHC RBC AUTO-ENTMCNC: 31.5 G/DL (ref 32–36)
MCV RBC AUTO: 85 FL (ref 82–98)
MONOCYTES # BLD AUTO: 0.7 K/UL (ref 0.3–1)
MONOCYTES NFR BLD: 8.6 % (ref 4–15)
NEUTROPHILS # BLD AUTO: 5.7 K/UL (ref 1.8–7.7)
NEUTROPHILS NFR BLD: 69.1 % (ref 38–73)
NRBC BLD-RTO: 0 /100 WBC
PLATELET # BLD AUTO: 391 K/UL (ref 150–450)
PMV BLD AUTO: 10.2 FL (ref 9.2–12.9)
POTASSIUM SERPL-SCNC: 3.4 MMOL/L (ref 3.5–5.1)
PROT SERPL-MCNC: 7 G/DL (ref 6–8.4)
PROTHROMBIN TIME: 11.6 SEC (ref 9–12.5)
RBC # BLD AUTO: 4.19 M/UL (ref 4–5.4)
SODIUM SERPL-SCNC: 140 MMOL/L (ref 136–145)
TROPONIN I SERPL DL<=0.01 NG/ML-MCNC: 0.74 NG/ML (ref 0–0.03)
WBC # BLD AUTO: 8.3 K/UL (ref 3.9–12.7)

## 2023-01-19 PROCEDURE — 93010 EKG 12-LEAD: ICD-10-PCS | Mod: ,,, | Performed by: INTERNAL MEDICINE

## 2023-01-19 PROCEDURE — 63600175 PHARM REV CODE 636 W HCPCS: Performed by: HOSPITALIST

## 2023-01-19 PROCEDURE — 94640 AIRWAY INHALATION TREATMENT: CPT

## 2023-01-19 PROCEDURE — 85025 COMPLETE CBC W/AUTO DIFF WBC: CPT | Mod: 91 | Performed by: NURSE PRACTITIONER

## 2023-01-19 PROCEDURE — 94761 N-INVAS EAR/PLS OXIMETRY MLT: CPT

## 2023-01-19 PROCEDURE — 84484 ASSAY OF TROPONIN QUANT: CPT | Performed by: FAMILY MEDICINE

## 2023-01-19 PROCEDURE — 85025 COMPLETE CBC W/AUTO DIFF WBC: CPT | Performed by: FAMILY MEDICINE

## 2023-01-19 PROCEDURE — 83880 ASSAY OF NATRIURETIC PEPTIDE: CPT | Performed by: NURSE PRACTITIONER

## 2023-01-19 PROCEDURE — 25000003 PHARM REV CODE 250: Performed by: FAMILY MEDICINE

## 2023-01-19 PROCEDURE — 99285 EMERGENCY DEPT VISIT HI MDM: CPT | Mod: 25

## 2023-01-19 PROCEDURE — 84484 ASSAY OF TROPONIN QUANT: CPT | Mod: 91 | Performed by: NURSE PRACTITIONER

## 2023-01-19 PROCEDURE — 36415 COLL VENOUS BLD VENIPUNCTURE: CPT | Performed by: FAMILY MEDICINE

## 2023-01-19 PROCEDURE — 25000242 PHARM REV CODE 250 ALT 637 W/ HCPCS: Performed by: FAMILY MEDICINE

## 2023-01-19 PROCEDURE — 83880 ASSAY OF NATRIURETIC PEPTIDE: CPT | Mod: 91 | Performed by: FAMILY MEDICINE

## 2023-01-19 PROCEDURE — 93010 ELECTROCARDIOGRAM REPORT: CPT | Mod: ,,, | Performed by: INTERNAL MEDICINE

## 2023-01-19 PROCEDURE — 63600175 PHARM REV CODE 636 W HCPCS: Performed by: FAMILY MEDICINE

## 2023-01-19 PROCEDURE — 85730 THROMBOPLASTIN TIME PARTIAL: CPT | Performed by: FAMILY MEDICINE

## 2023-01-19 PROCEDURE — 80053 COMPREHEN METABOLIC PANEL: CPT | Performed by: NURSE PRACTITIONER

## 2023-01-19 PROCEDURE — 25000003 PHARM REV CODE 250: Performed by: HOSPITALIST

## 2023-01-19 PROCEDURE — 85610 PROTHROMBIN TIME: CPT | Performed by: FAMILY MEDICINE

## 2023-01-19 PROCEDURE — 93005 ELECTROCARDIOGRAM TRACING: CPT

## 2023-01-19 PROCEDURE — 11000001 HC ACUTE MED/SURG PRIVATE ROOM

## 2023-01-19 RX ORDER — AMOXICILLIN 250 MG
1 CAPSULE ORAL 2 TIMES DAILY PRN
Status: DISCONTINUED | OUTPATIENT
Start: 2023-01-19 | End: 2023-01-24 | Stop reason: HOSPADM

## 2023-01-19 RX ORDER — FUROSEMIDE 10 MG/ML
20 INJECTION INTRAMUSCULAR; INTRAVENOUS ONCE
Status: COMPLETED | OUTPATIENT
Start: 2023-01-19 | End: 2023-01-19

## 2023-01-19 RX ORDER — SODIUM CHLORIDE 0.9 % (FLUSH) 0.9 %
3 SYRINGE (ML) INJECTION EVERY 12 HOURS PRN
Status: DISCONTINUED | OUTPATIENT
Start: 2023-01-19 | End: 2023-01-24 | Stop reason: HOSPADM

## 2023-01-19 RX ORDER — ATORVASTATIN CALCIUM 40 MG/1
40 TABLET, FILM COATED ORAL ONCE
Status: COMPLETED | OUTPATIENT
Start: 2023-01-19 | End: 2023-01-19

## 2023-01-19 RX ORDER — GLUCAGON 1 MG
1 KIT INJECTION
Status: DISCONTINUED | OUTPATIENT
Start: 2023-01-19 | End: 2023-01-24 | Stop reason: HOSPADM

## 2023-01-19 RX ORDER — ATORVASTATIN CALCIUM 20 MG/1
20 TABLET, FILM COATED ORAL DAILY
COMMUNITY
Start: 2022-11-29

## 2023-01-19 RX ORDER — BUDESONIDE 0.5 MG/2ML
0.5 INHALANT ORAL EVERY 12 HOURS
Status: DISCONTINUED | OUTPATIENT
Start: 2023-01-20 | End: 2023-01-24 | Stop reason: HOSPADM

## 2023-01-19 RX ORDER — MAG HYDROX/ALUMINUM HYD/SIMETH 200-200-20
30 SUSPENSION, ORAL (FINAL DOSE FORM) ORAL 4 TIMES DAILY PRN
Status: DISCONTINUED | OUTPATIENT
Start: 2023-01-19 | End: 2023-01-24 | Stop reason: HOSPADM

## 2023-01-19 RX ORDER — PROMETHAZINE HYDROCHLORIDE 25 MG/1
25 TABLET ORAL EVERY 6 HOURS PRN
Status: DISCONTINUED | OUTPATIENT
Start: 2023-01-19 | End: 2023-01-24 | Stop reason: HOSPADM

## 2023-01-19 RX ORDER — ESCITALOPRAM OXALATE 10 MG/1
10 TABLET ORAL DAILY
COMMUNITY
Start: 2022-10-26

## 2023-01-19 RX ORDER — HEPARIN SODIUM,PORCINE/D5W 25000/250
0-40 INTRAVENOUS SOLUTION INTRAVENOUS CONTINUOUS
Status: DISCONTINUED | OUTPATIENT
Start: 2023-01-19 | End: 2023-01-22

## 2023-01-19 RX ORDER — PANTOPRAZOLE SODIUM 40 MG/1
40 TABLET, DELAYED RELEASE ORAL 2 TIMES DAILY
Status: DISCONTINUED | OUTPATIENT
Start: 2023-01-19 | End: 2023-01-24 | Stop reason: HOSPADM

## 2023-01-19 RX ORDER — ALBUTEROL SULFATE 90 UG/1
AEROSOL, METERED RESPIRATORY (INHALATION) DAILY PRN
COMMUNITY

## 2023-01-19 RX ORDER — METOPROLOL TARTRATE 1 MG/ML
5 INJECTION, SOLUTION INTRAVENOUS
Status: COMPLETED | OUTPATIENT
Start: 2023-01-19 | End: 2023-01-19

## 2023-01-19 RX ORDER — NALOXONE HCL 0.4 MG/ML
0.02 VIAL (ML) INJECTION
Status: DISCONTINUED | OUTPATIENT
Start: 2023-01-19 | End: 2023-01-24 | Stop reason: HOSPADM

## 2023-01-19 RX ORDER — HYDROCODONE BITARTRATE AND ACETAMINOPHEN 5; 325 MG/1; MG/1
1 TABLET ORAL EVERY 6 HOURS PRN
Status: DISCONTINUED | OUTPATIENT
Start: 2023-01-19 | End: 2023-01-22 | Stop reason: SDUPTHER

## 2023-01-19 RX ORDER — IBUPROFEN 200 MG
16 TABLET ORAL
Status: DISCONTINUED | OUTPATIENT
Start: 2023-01-19 | End: 2023-01-24 | Stop reason: HOSPADM

## 2023-01-19 RX ORDER — DOCUSATE SODIUM 100 MG/1
100 CAPSULE, LIQUID FILLED ORAL 2 TIMES DAILY PRN
Status: DISCONTINUED | OUTPATIENT
Start: 2023-01-19 | End: 2023-01-24 | Stop reason: HOSPADM

## 2023-01-19 RX ORDER — ACETAMINOPHEN 325 MG/1
650 TABLET ORAL EVERY 8 HOURS PRN
Status: DISCONTINUED | OUTPATIENT
Start: 2023-01-19 | End: 2023-01-22

## 2023-01-19 RX ORDER — ARFORMOTEROL TARTRATE 15 UG/2ML
15 SOLUTION RESPIRATORY (INHALATION) 2 TIMES DAILY
Status: DISCONTINUED | OUTPATIENT
Start: 2023-01-20 | End: 2023-01-24 | Stop reason: HOSPADM

## 2023-01-19 RX ORDER — ACETAMINOPHEN 650 MG/1
650 SUPPOSITORY RECTAL EVERY 6 HOURS PRN
Status: DISCONTINUED | OUTPATIENT
Start: 2023-01-19 | End: 2023-01-24 | Stop reason: HOSPADM

## 2023-01-19 RX ORDER — ALBUTEROL SULFATE 0.83 MG/ML
2.5 SOLUTION RESPIRATORY (INHALATION) EVERY 4 HOURS PRN
Status: DISCONTINUED | OUTPATIENT
Start: 2023-01-19 | End: 2023-01-19 | Stop reason: SDUPTHER

## 2023-01-19 RX ORDER — IPRATROPIUM BROMIDE AND ALBUTEROL SULFATE 2.5; .5 MG/3ML; MG/3ML
3 SOLUTION RESPIRATORY (INHALATION)
Status: COMPLETED | OUTPATIENT
Start: 2023-01-19 | End: 2023-01-19

## 2023-01-19 RX ORDER — IPRATROPIUM BROMIDE 0.5 MG/2.5ML
0.5 SOLUTION RESPIRATORY (INHALATION) EVERY 6 HOURS
Status: DISCONTINUED | OUTPATIENT
Start: 2023-01-20 | End: 2023-01-20

## 2023-01-19 RX ORDER — ESCITALOPRAM OXALATE 10 MG/1
10 TABLET ORAL DAILY
Status: DISCONTINUED | OUTPATIENT
Start: 2023-01-20 | End: 2023-01-24 | Stop reason: HOSPADM

## 2023-01-19 RX ORDER — IBUPROFEN 200 MG
24 TABLET ORAL
Status: DISCONTINUED | OUTPATIENT
Start: 2023-01-19 | End: 2023-01-24 | Stop reason: HOSPADM

## 2023-01-19 RX ORDER — ALBUTEROL SULFATE 90 UG/1
1 AEROSOL, METERED RESPIRATORY (INHALATION) EVERY 4 HOURS PRN
Status: DISCONTINUED | OUTPATIENT
Start: 2023-01-19 | End: 2023-01-19 | Stop reason: CLARIF

## 2023-01-19 RX ORDER — TALC
6 POWDER (GRAM) TOPICAL NIGHTLY PRN
Status: DISCONTINUED | OUTPATIENT
Start: 2023-01-19 | End: 2023-01-24 | Stop reason: HOSPADM

## 2023-01-19 RX ORDER — IPRATROPIUM BROMIDE AND ALBUTEROL SULFATE 2.5; .5 MG/3ML; MG/3ML
3 SOLUTION RESPIRATORY (INHALATION) EVERY 6 HOURS PRN
Status: DISCONTINUED | OUTPATIENT
Start: 2023-01-19 | End: 2023-01-20

## 2023-01-19 RX ORDER — ONDANSETRON 2 MG/ML
4 INJECTION INTRAMUSCULAR; INTRAVENOUS EVERY 8 HOURS PRN
Status: DISCONTINUED | OUTPATIENT
Start: 2023-01-19 | End: 2023-01-24 | Stop reason: HOSPADM

## 2023-01-19 RX ORDER — ASPIRIN 325 MG
325 TABLET ORAL
Status: COMPLETED | OUTPATIENT
Start: 2023-01-19 | End: 2023-01-19

## 2023-01-19 RX ORDER — MORPHINE SULFATE 4 MG/ML
2 INJECTION, SOLUTION INTRAMUSCULAR; INTRAVENOUS EVERY 4 HOURS PRN
Status: DISCONTINUED | OUTPATIENT
Start: 2023-01-19 | End: 2023-01-24 | Stop reason: HOSPADM

## 2023-01-19 RX ADMIN — FUROSEMIDE 20 MG: 10 INJECTION, SOLUTION INTRAMUSCULAR; INTRAVENOUS at 10:01

## 2023-01-19 RX ADMIN — PANTOPRAZOLE SODIUM 40 MG: 40 TABLET, DELAYED RELEASE ORAL at 10:01

## 2023-01-19 RX ADMIN — IPRATROPIUM BROMIDE AND ALBUTEROL SULFATE 3 ML: .5; 3 SOLUTION RESPIRATORY (INHALATION) at 09:01

## 2023-01-19 RX ADMIN — ASPIRIN 325 MG ORAL TABLET 325 MG: 325 PILL ORAL at 08:01

## 2023-01-19 RX ADMIN — METOROPROLOL TARTRATE 5 MG: 5 INJECTION, SOLUTION INTRAVENOUS at 10:01

## 2023-01-19 RX ADMIN — HEPARIN SODIUM 12 UNITS/KG/HR: 10000 INJECTION, SOLUTION INTRAVENOUS at 10:01

## 2023-01-19 RX ADMIN — ATORVASTATIN CALCIUM 40 MG: 40 TABLET, FILM COATED ORAL at 10:01

## 2023-01-19 NOTE — Clinical Note
The catheter was inserted into the ostium   left main. An angiography was performed of the left coronary arteries. Multiple views were taken. Followed protocol

## 2023-01-19 NOTE — Clinical Note
The left ventricle was injected. The injected rate was 13 mL/sec. The total injected volume was 30 mL.

## 2023-01-20 PROBLEM — Z86.73 HISTORY OF CVA (CEREBROVASCULAR ACCIDENT): Status: ACTIVE | Noted: 2023-01-20

## 2023-01-20 PROBLEM — K21.9 GERD (GASTROESOPHAGEAL REFLUX DISEASE): Status: ACTIVE | Noted: 2023-01-20

## 2023-01-20 PROBLEM — E78.5 HLD (HYPERLIPIDEMIA): Status: ACTIVE | Noted: 2023-01-20

## 2023-01-20 PROBLEM — Z86.711 HISTORY OF PULMONARY EMBOLUS (PE): Status: ACTIVE | Noted: 2023-01-20

## 2023-01-20 PROBLEM — R79.89 ELEVATED TROPONIN: Status: ACTIVE | Noted: 2023-01-20

## 2023-01-20 PROBLEM — I21.4 NSTEMI (NON-ST ELEVATED MYOCARDIAL INFARCTION): Status: ACTIVE | Noted: 2023-01-20

## 2023-01-20 PROBLEM — I50.9 NEW ONSET OF CONGESTIVE HEART FAILURE: Status: ACTIVE | Noted: 2023-01-20

## 2023-01-20 PROBLEM — R06.02 SHORTNESS OF BREATH: Status: ACTIVE | Noted: 2023-01-20

## 2023-01-20 PROBLEM — I10 PRIMARY HYPERTENSION: Status: ACTIVE | Noted: 2023-01-20

## 2023-01-20 PROBLEM — F32.A DEPRESSION: Status: ACTIVE | Noted: 2023-01-20

## 2023-01-20 LAB
ALBUMIN SERPL BCP-MCNC: 2.9 G/DL (ref 3.5–5.2)
ALLENS TEST: ABNORMAL
ALP SERPL-CCNC: 90 U/L (ref 55–135)
ALT SERPL W/O P-5'-P-CCNC: 16 U/L (ref 10–44)
ANION GAP SERPL CALC-SCNC: 9 MMOL/L (ref 8–16)
AORTIC ROOT ANNULUS: 2.46 CM
APTT BLDCRRT: 46.9 SEC (ref 21–32)
APTT BLDCRRT: 61.6 SEC (ref 21–32)
ASCENDING AORTA: 2.7 CM
AST SERPL-CCNC: 18 U/L (ref 10–40)
AV INDEX (PROSTH): 0.37
AV MEAN GRADIENT: 17 MMHG
AV PEAK GRADIENT: 25 MMHG
AV VALVE AREA: 0.88 CM2
AV VELOCITY RATIO: 0.3
BASOPHILS # BLD AUTO: 0.04 K/UL (ref 0–0.2)
BASOPHILS NFR BLD: 0.5 % (ref 0–1.9)
BILIRUB SERPL-MCNC: 0.6 MG/DL (ref 0.1–1)
BNP SERPL-MCNC: 2511 PG/ML (ref 0–99)
BSA FOR ECHO PROCEDURE: 1.81 M2
BUN SERPL-MCNC: 18 MG/DL (ref 8–23)
CALCIUM SERPL-MCNC: 8.5 MG/DL (ref 8.7–10.5)
CHLORIDE SERPL-SCNC: 106 MMOL/L (ref 95–110)
CHOLEST SERPL-MCNC: 132 MG/DL (ref 120–199)
CHOLEST/HDLC SERPL: 2.9 {RATIO} (ref 2–5)
CO2 SERPL-SCNC: 26 MMOL/L (ref 23–29)
CREAT SERPL-MCNC: 0.8 MG/DL (ref 0.5–1.4)
CV ECHO LV RWT: 0.55 CM
D DIMER PPP IA.FEU-MCNC: 0.8 MG/L FEU
DELSYS: ABNORMAL
DIFFERENTIAL METHOD: ABNORMAL
DOP CALC AO PEAK VEL: 2.49 M/S
DOP CALC AO VTI: 47.1 CM
DOP CALC LVOT AREA: 2.4 CM2
DOP CALC LVOT DIAMETER: 1.74 CM
DOP CALC LVOT PEAK VEL: 0.74 M/S
DOP CALC LVOT STROKE VOLUME: 41.59 CM3
DOP CALC RVOT PEAK VEL: 0.82 M/S
DOP CALC RVOT VTI: 16.4 CM
DOP CALCLVOT PEAK VEL VTI: 17.5 CM
E WAVE DECELERATION TIME: 234.5 MSEC
E/A RATIO: 1.19
E/E' RATIO: 14.82 M/S
ECHO LV POSTERIOR WALL: 1.4 CM (ref 0.6–1.1)
EJECTION FRACTION: 25 %
EOSINOPHIL # BLD AUTO: 0 K/UL (ref 0–0.5)
EOSINOPHIL # BLD AUTO: 0 K/UL (ref 0–0.5)
EOSINOPHIL # BLD AUTO: 0.1 K/UL (ref 0–0.5)
EOSINOPHIL NFR BLD: 0.4 % (ref 0–8)
EOSINOPHIL NFR BLD: 0.4 % (ref 0–8)
EOSINOPHIL NFR BLD: 0.7 % (ref 0–8)
ERYTHROCYTE [DISTWIDTH] IN BLOOD BY AUTOMATED COUNT: 15.3 % (ref 11.5–14.5)
EST. GFR  (NO RACE VARIABLE): >60 ML/MIN/1.73 M^2
FIO2: 28
FLOW: 2
FRACTIONAL SHORTENING: 12 % (ref 28–44)
GLUCOSE SERPL-MCNC: 107 MG/DL (ref 70–110)
HCO3 UR-SCNC: 28.5 MMOL/L (ref 24–28)
HCT VFR BLD AUTO: 31.7 % (ref 37–48.5)
HCT VFR BLD AUTO: 31.8 % (ref 37–48.5)
HCT VFR BLD AUTO: 34.6 % (ref 37–48.5)
HDLC SERPL-MCNC: 45 MG/DL (ref 40–75)
HDLC SERPL: 34.1 % (ref 20–50)
HGB BLD-MCNC: 10 G/DL (ref 12–16)
HGB BLD-MCNC: 10.2 G/DL (ref 12–16)
HGB BLD-MCNC: 10.8 G/DL (ref 12–16)
IMM GRANULOCYTES # BLD AUTO: 0.02 K/UL (ref 0–0.04)
IMM GRANULOCYTES # BLD AUTO: 0.02 K/UL (ref 0–0.04)
IMM GRANULOCYTES # BLD AUTO: 0.03 K/UL (ref 0–0.04)
IMM GRANULOCYTES NFR BLD AUTO: 0.2 % (ref 0–0.5)
IMM GRANULOCYTES NFR BLD AUTO: 0.3 % (ref 0–0.5)
IMM GRANULOCYTES NFR BLD AUTO: 0.4 % (ref 0–0.5)
INTERVENTRICULAR SEPTUM: 1.51 CM (ref 0.6–1.1)
IVC DIAMETER: 1.69 CM
IVRT: 62.8 MSEC
LA MAJOR: 4.11 CM
LA MINOR: 4.31 CM
LA WIDTH: 3.8 CM
LDLC SERPL CALC-MCNC: 75.8 MG/DL (ref 63–159)
LEFT ATRIUM SIZE: 3.52 CM
LEFT ATRIUM VOLUME INDEX: 26.7 ML/M2
LEFT ATRIUM VOLUME: 47.84 CM3
LEFT INTERNAL DIMENSION IN SYSTOLE: 4.49 CM (ref 2.1–4)
LEFT VENTRICLE DIASTOLIC VOLUME INDEX: 68.46 ML/M2
LEFT VENTRICLE DIASTOLIC VOLUME: 122.55 ML
LEFT VENTRICLE MASS INDEX: 176 G/M2
LEFT VENTRICLE SYSTOLIC VOLUME INDEX: 51.4 ML/M2
LEFT VENTRICLE SYSTOLIC VOLUME: 91.93 ML
LEFT VENTRICULAR INTERNAL DIMENSION IN DIASTOLE: 5.08 CM (ref 3.5–6)
LEFT VENTRICULAR MASS: 315.92 G
LV LATERAL E/E' RATIO: 15.75 M/S
LV SEPTAL E/E' RATIO: 14 M/S
LVOT MG: 1.7 MMHG
LVOT MV: 0.63 CM/S
LYMPHOCYTES # BLD AUTO: 2 K/UL (ref 1–4.8)
LYMPHOCYTES # BLD AUTO: 2 K/UL (ref 1–4.8)
LYMPHOCYTES # BLD AUTO: 2.3 K/UL (ref 1–4.8)
LYMPHOCYTES NFR BLD: 23.3 % (ref 18–48)
LYMPHOCYTES NFR BLD: 26.4 % (ref 18–48)
LYMPHOCYTES NFR BLD: 30.6 % (ref 18–48)
MAGNESIUM SERPL-MCNC: 2.1 MG/DL (ref 1.6–2.6)
MCH RBC QN AUTO: 26.3 PG (ref 27–31)
MCH RBC QN AUTO: 26.5 PG (ref 27–31)
MCH RBC QN AUTO: 26.8 PG (ref 27–31)
MCHC RBC AUTO-ENTMCNC: 31.2 G/DL (ref 32–36)
MCHC RBC AUTO-ENTMCNC: 31.5 G/DL (ref 32–36)
MCHC RBC AUTO-ENTMCNC: 32.1 G/DL (ref 32–36)
MCV RBC AUTO: 83 FL (ref 82–98)
MCV RBC AUTO: 84 FL (ref 82–98)
MCV RBC AUTO: 85 FL (ref 82–98)
MODE: ABNORMAL
MONOCYTES # BLD AUTO: 0.7 K/UL (ref 0.3–1)
MONOCYTES # BLD AUTO: 0.7 K/UL (ref 0.3–1)
MONOCYTES # BLD AUTO: 0.8 K/UL (ref 0.3–1)
MONOCYTES NFR BLD: 9.3 % (ref 4–15)
MONOCYTES NFR BLD: 9.4 % (ref 4–15)
MONOCYTES NFR BLD: 9.4 % (ref 4–15)
MV PEAK A VEL: 1.06 M/S
MV PEAK E VEL: 1.26 M/S
MV STENOSIS PRESSURE HALF TIME: 68.01 MS
MV VALVE AREA P 1/2 METHOD: 3.23 CM2
NEUTROPHILS # BLD AUTO: 4.3 K/UL (ref 1.8–7.7)
NEUTROPHILS # BLD AUTO: 4.8 K/UL (ref 1.8–7.7)
NEUTROPHILS # BLD AUTO: 5.7 K/UL (ref 1.8–7.7)
NEUTROPHILS NFR BLD: 58.4 % (ref 38–73)
NEUTROPHILS NFR BLD: 63 % (ref 38–73)
NEUTROPHILS NFR BLD: 66.3 % (ref 38–73)
NONHDLC SERPL-MCNC: 87 MG/DL
NRBC BLD-RTO: 0 /100 WBC
PCO2 BLDA: 38.4 MMHG (ref 35–45)
PH SMN: 7.48 [PH] (ref 7.35–7.45)
PISA MRMAX VEL: 5.2 M/S
PISA TR MAX VEL: 3.4 M/S
PLATELET # BLD AUTO: 275 K/UL (ref 150–450)
PLATELET # BLD AUTO: 277 K/UL (ref 150–450)
PLATELET # BLD AUTO: 349 K/UL (ref 150–450)
PMV BLD AUTO: 10.2 FL (ref 9.2–12.9)
PMV BLD AUTO: 9.8 FL (ref 9.2–12.9)
PMV BLD AUTO: 9.8 FL (ref 9.2–12.9)
PO2 BLDA: 61 MMHG (ref 80–100)
POC BE: 5 MMOL/L
POC SATURATED O2: 93 % (ref 95–100)
POTASSIUM SERPL-SCNC: 3.4 MMOL/L (ref 3.5–5.1)
PROT SERPL-MCNC: 6 G/DL (ref 6–8.4)
PV MEAN GRADIENT: 2.05 MMHG
RA MAJOR: 4.22 CM
RA PRESSURE: 15 MMHG
RA WIDTH: 3.03 CM
RBC # BLD AUTO: 3.8 M/UL (ref 4–5.4)
RBC # BLD AUTO: 3.8 M/UL (ref 4–5.4)
RBC # BLD AUTO: 4.07 M/UL (ref 4–5.4)
SAMPLE: ABNORMAL
SINUS: 2.41 CM
SITE: ABNORMAL
SODIUM SERPL-SCNC: 141 MMOL/L (ref 136–145)
STJ: 2.53 CM
TDI LATERAL: 0.08 M/S
TDI SEPTAL: 0.09 M/S
TDI: 0.09 M/S
TR MAX PG: 46 MMHG
TRICUSPID ANNULAR PLANE SYSTOLIC EXCURSION: 1.78 CM
TRIGL SERPL-MCNC: 56 MG/DL (ref 30–150)
TROPONIN I SERPL DL<=0.01 NG/ML-MCNC: 0.76 NG/ML (ref 0–0.03)
TROPONIN I SERPL DL<=0.01 NG/ML-MCNC: 1.02 NG/ML (ref 0–0.03)
TV REST PULMONARY ARTERY PRESSURE: 61 MMHG
WBC # BLD AUTO: 7.41 K/UL (ref 3.9–12.7)
WBC # BLD AUTO: 7.59 K/UL (ref 3.9–12.7)
WBC # BLD AUTO: 8.51 K/UL (ref 3.9–12.7)

## 2023-01-20 PROCEDURE — 36600 WITHDRAWAL OF ARTERIAL BLOOD: CPT

## 2023-01-20 PROCEDURE — 85025 COMPLETE CBC W/AUTO DIFF WBC: CPT | Performed by: FAMILY MEDICINE

## 2023-01-20 PROCEDURE — 85379 FIBRIN DEGRADATION QUANT: CPT | Performed by: HOSPITALIST

## 2023-01-20 PROCEDURE — 36415 COLL VENOUS BLD VENIPUNCTURE: CPT | Performed by: FAMILY MEDICINE

## 2023-01-20 PROCEDURE — 93005 ELECTROCARDIOGRAM TRACING: CPT

## 2023-01-20 PROCEDURE — 85730 THROMBOPLASTIN TIME PARTIAL: CPT | Performed by: HOSPITALIST

## 2023-01-20 PROCEDURE — 85025 COMPLETE CBC W/AUTO DIFF WBC: CPT | Mod: 91 | Performed by: HOSPITALIST

## 2023-01-20 PROCEDURE — 94761 N-INVAS EAR/PLS OXIMETRY MLT: CPT

## 2023-01-20 PROCEDURE — 11000001 HC ACUTE MED/SURG PRIVATE ROOM

## 2023-01-20 PROCEDURE — 82803 BLOOD GASES ANY COMBINATION: CPT

## 2023-01-20 PROCEDURE — 25000003 PHARM REV CODE 250: Performed by: INTERNAL MEDICINE

## 2023-01-20 PROCEDURE — 80053 COMPREHEN METABOLIC PANEL: CPT | Performed by: HOSPITALIST

## 2023-01-20 PROCEDURE — 93010 EKG 12-LEAD: ICD-10-PCS | Mod: ,,, | Performed by: INTERNAL MEDICINE

## 2023-01-20 PROCEDURE — 93010 ELECTROCARDIOGRAM REPORT: CPT | Mod: ,,, | Performed by: INTERNAL MEDICINE

## 2023-01-20 PROCEDURE — 21400001 HC TELEMETRY ROOM

## 2023-01-20 PROCEDURE — 36415 COLL VENOUS BLD VENIPUNCTURE: CPT | Performed by: HOSPITALIST

## 2023-01-20 PROCEDURE — 25000003 PHARM REV CODE 250: Performed by: HOSPITALIST

## 2023-01-20 PROCEDURE — 99223 1ST HOSP IP/OBS HIGH 75: CPT | Mod: ,,, | Performed by: INTERNAL MEDICINE

## 2023-01-20 PROCEDURE — 63600175 PHARM REV CODE 636 W HCPCS: Performed by: INTERNAL MEDICINE

## 2023-01-20 PROCEDURE — 99900035 HC TECH TIME PER 15 MIN (STAT)

## 2023-01-20 PROCEDURE — 80061 LIPID PANEL: CPT | Performed by: HOSPITALIST

## 2023-01-20 PROCEDURE — 25000242 PHARM REV CODE 250 ALT 637 W/ HCPCS: Performed by: INTERNAL MEDICINE

## 2023-01-20 PROCEDURE — 85730 THROMBOPLASTIN TIME PARTIAL: CPT | Mod: 91 | Performed by: FAMILY MEDICINE

## 2023-01-20 PROCEDURE — 63600175 PHARM REV CODE 636 W HCPCS: Performed by: FAMILY MEDICINE

## 2023-01-20 PROCEDURE — 25000003 PHARM REV CODE 250: Performed by: FAMILY MEDICINE

## 2023-01-20 PROCEDURE — 94640 AIRWAY INHALATION TREATMENT: CPT

## 2023-01-20 PROCEDURE — 84484 ASSAY OF TROPONIN QUANT: CPT | Performed by: HOSPITALIST

## 2023-01-20 PROCEDURE — 99223 PR INITIAL HOSPITAL CARE,LEVL III: ICD-10-PCS | Mod: ,,, | Performed by: INTERNAL MEDICINE

## 2023-01-20 PROCEDURE — 27000221 HC OXYGEN, UP TO 24 HOURS

## 2023-01-20 PROCEDURE — 25000242 PHARM REV CODE 250 ALT 637 W/ HCPCS: Performed by: HOSPITALIST

## 2023-01-20 PROCEDURE — 83735 ASSAY OF MAGNESIUM: CPT | Performed by: HOSPITALIST

## 2023-01-20 PROCEDURE — 94799 UNLISTED PULMONARY SVC/PX: CPT

## 2023-01-20 RX ORDER — BACLOFEN 5 MG/1
5 TABLET ORAL 3 TIMES DAILY PRN
Status: DISCONTINUED | OUTPATIENT
Start: 2023-01-20 | End: 2023-01-21

## 2023-01-20 RX ORDER — FUROSEMIDE 10 MG/ML
40 INJECTION INTRAMUSCULAR; INTRAVENOUS ONCE
Status: COMPLETED | OUTPATIENT
Start: 2023-01-20 | End: 2023-01-20

## 2023-01-20 RX ORDER — SODIUM CHLORIDE 9 MG/ML
INJECTION, SOLUTION INTRAVENOUS CONTINUOUS
Status: CANCELLED | OUTPATIENT
Start: 2023-01-20

## 2023-01-20 RX ORDER — FUROSEMIDE 10 MG/ML
80 INJECTION INTRAMUSCULAR; INTRAVENOUS
Status: DISCONTINUED | OUTPATIENT
Start: 2023-01-21 | End: 2023-01-21

## 2023-01-20 RX ORDER — POTASSIUM CHLORIDE 20 MEQ/1
20 TABLET, EXTENDED RELEASE ORAL DAILY
Status: DISCONTINUED | OUTPATIENT
Start: 2023-01-20 | End: 2023-01-24 | Stop reason: HOSPADM

## 2023-01-20 RX ORDER — LEVALBUTEROL INHALATION SOLUTION 0.63 MG/3ML
0.63 SOLUTION RESPIRATORY (INHALATION) EVERY 6 HOURS
Status: DISCONTINUED | OUTPATIENT
Start: 2023-01-20 | End: 2023-01-20

## 2023-01-20 RX ORDER — ASPIRIN 81 MG/1
81 TABLET ORAL DAILY
Status: DISCONTINUED | OUTPATIENT
Start: 2023-01-20 | End: 2023-01-24 | Stop reason: HOSPADM

## 2023-01-20 RX ORDER — LOSARTAN POTASSIUM 25 MG/1
25 TABLET ORAL DAILY
Status: DISCONTINUED | OUTPATIENT
Start: 2023-01-20 | End: 2023-01-21

## 2023-01-20 RX ORDER — FUROSEMIDE 10 MG/ML
40 INJECTION INTRAMUSCULAR; INTRAVENOUS
Status: DISCONTINUED | OUTPATIENT
Start: 2023-01-20 | End: 2023-01-20

## 2023-01-20 RX ORDER — IPRATROPIUM BROMIDE AND ALBUTEROL SULFATE 2.5; .5 MG/3ML; MG/3ML
3 SOLUTION RESPIRATORY (INHALATION) EVERY 6 HOURS
Status: DISCONTINUED | OUTPATIENT
Start: 2023-01-20 | End: 2023-01-24 | Stop reason: HOSPADM

## 2023-01-20 RX ORDER — FUROSEMIDE 10 MG/ML
80 INJECTION INTRAMUSCULAR; INTRAVENOUS
Status: DISCONTINUED | OUTPATIENT
Start: 2023-01-21 | End: 2023-01-20

## 2023-01-20 RX ADMIN — IPRATROPIUM BROMIDE AND ALBUTEROL SULFATE 3 ML: 2.5; .5 SOLUTION RESPIRATORY (INHALATION) at 08:01

## 2023-01-20 RX ADMIN — PANTOPRAZOLE SODIUM 40 MG: 40 TABLET, DELAYED RELEASE ORAL at 09:01

## 2023-01-20 RX ADMIN — BUDESONIDE 0.5 MG: 0.5 INHALANT ORAL at 08:01

## 2023-01-20 RX ADMIN — ARFORMOTEROL TARTRATE 15 MCG: 15 SOLUTION RESPIRATORY (INHALATION) at 08:01

## 2023-01-20 RX ADMIN — IPRATROPIUM BROMIDE 0.5 MG: 0.5 SOLUTION RESPIRATORY (INHALATION) at 02:01

## 2023-01-20 RX ADMIN — POTASSIUM CHLORIDE 20 MEQ: 1500 TABLET, EXTENDED RELEASE ORAL at 01:01

## 2023-01-20 RX ADMIN — ESCITALOPRAM OXALATE 10 MG: 10 TABLET ORAL at 09:01

## 2023-01-20 RX ADMIN — FUROSEMIDE 40 MG: 10 INJECTION, SOLUTION INTRAMUSCULAR; INTRAVENOUS at 01:01

## 2023-01-20 RX ADMIN — ASPIRIN 81 MG: 81 TABLET, COATED ORAL at 01:01

## 2023-01-20 RX ADMIN — LOSARTAN POTASSIUM 25 MG: 25 TABLET, FILM COATED ORAL at 01:01

## 2023-01-20 RX ADMIN — IPRATROPIUM BROMIDE 0.5 MG: 0.5 SOLUTION RESPIRATORY (INHALATION) at 08:01

## 2023-01-20 RX ADMIN — BACLOFEN 5 MG: 5 TABLET ORAL at 09:01

## 2023-01-20 RX ADMIN — FUROSEMIDE 40 MG: 10 INJECTION, SOLUTION INTRAMUSCULAR; INTRAVENOUS at 03:01

## 2023-01-20 RX ADMIN — BACLOFEN 5 MG: 5 TABLET ORAL at 05:01

## 2023-01-20 RX ADMIN — IPRATROPIUM BROMIDE AND ALBUTEROL SULFATE 3 ML: .5; 3 SOLUTION RESPIRATORY (INHALATION) at 01:01

## 2023-01-20 NOTE — ASSESSMENT & PLAN NOTE
Patient with history remote CVA and PE proximally 4 years prior to admission previously treated with Eliquis.  Patient does present with some concerns of recurrent PE but has likely cause of shortness a breath, tachypnea, and hypoxia given CHF exacerbation.  We will obtain D-dimer as it was not obtained in the ED and consider CTA for definitive rule out of PE given history and presentation.  Currently on heparin drip for treatment of NSTEMI with cardiology following and awaiting further evaluation/recommendations.  Plan:  -titrate oxygen therapy as needed  -treat CHF exacerbation and NSTEMI as noted above  -f/u d-dimer  -consider CTA pending results

## 2023-01-20 NOTE — HPI
Violeta Scherer is a 72-year-old female with remote history of pulmonary embolus, hypertension, and prior stroke who is currently admitted to hospitalist service after presenting with progressively worsening shortness of breath for 3 days prior to hospital presentation. Some history was provided by her daughter, who was at the bedside. Reportedly, she had been experiencing worsening shortness of breath and orthopnea for the past few days with associated significantly worsening lower extremity edema which began on the day prior to hospital presentation.  No prior history of heart failure no formal diagnosis of COPD, but she did previously see Dr. Oneal and is prescribed a Trelegy inhaler. She does have a heavy smoking history, though quit more than 20 years ago. Denied complaints of  chest pain, fevers/chills, sick contacts, cough, sputum production, dizziness / lightheadedness.    Her workup while inpatient has showed NSTEMI and new onset heart failure. She is currently on heparin drip. She initially  received 20 mg of IV Lasix on 1/19 with no I/O's charted. She received an additional 40 mg of IV Lasix on 1/20.  Pulmonary and Critical Care were consulted due to increasing lethargy on the afternoon of 1/20.  At the time our initial exam on Jan 20th, her oxygenation was acceptable on 2-3 L via nasal cannula. Patient was drowsy, but easily arousable to voice and answered questions appropriately. Did appear to have some increased work of breathing. ABG was without evidence of hypercapnia.    Interval history, 1/21/2023:  - patient clinically improved this am  - currently on 2L/NC  - patient reports she feels significantly better this am  - mentation improved; awake and alert conversing with friends and family  - plan for heart cath tomorrow / Monday per Cardiology    1/23: pt underwent LHC 1/22 with findings of EF 20%, 3+ MR, and diffuse CAD; feeling better this AM with diuresis; daughter at bedside; pt remains on 2L;  appears weak

## 2023-01-20 NOTE — CONSULTS
[facility]  Pulmonology  Consult Note    Patient Name: Violeta Scherer  MRN: [id number]  Admission Date: 1/19/2023  Hospital Length of Stay: 1 days  Code Status: Full Code  Attending Physician: Calvin Schrader MD  Primary Care Provider: Karrie Birmingham MD   Principal Problem: <principal problem not specified>    Subjective:     HPI:  Ms. Scherer is a 72-year-old white female with remote history of PE, HTN, and prior stroke who is currently admitted to hospitalist service after presenting last night with progressively worsening shortness of breath the past 3 days.  Some history is provided by her daughter, who is at bedside.  Reportedly, she had been experiencing worsening shortness of breath and orthopnea for the past few days in significantly worsening lower extremity edema yesterday was noted.  No prior history of heart failure no formal diagnosis of COPD, but she did you see Dr. Oneal and is taking Trelegy inhaler.  She does have heavy smoking history, though quit more than 20 years ago.  No chest pain, fevers/chills, sick contacts, cough, sputum, dizziness/lightheadedness.    Her workup while inpatient has showed NSTEMI and new onset heart failure.  She is currently on heparin drip.  He received 20 mg of IV Lasix last night, though no I/O's are charted.  She had an additional 40 mg of IV Lasix at the time of my exam.  Pulmonary and Critical Care consulted do some increasing lethargy this afternoon.  At the time my exam, oxygenation is okay on 2-3 L via nasal cannula.  Patient is drowsy, but easily arousable to voice and answers questions appropriately.  Does appear to have some increased work of breathing.  Her daughter does note that she did sleep well last night and this is kind of happy she looks when she sleeps.  ABG is without hypercapnia.      Past Medical History:   Diagnosis Date    Hypertension     Other pulmonary embolism without acute cor pulmonale     approx greater than 4 years    Stroke      TIA       Past Surgical History:   Procedure Laterality Date    TONSILLECTOMY         Review of patient's allergies indicates:   Allergen Reactions    Penicillins Hives    Bactrim [sulfamethoxazole-trimethoprim] Hives       Family History    None       Tobacco Use    Smoking status: Former     Packs/day: 3.00     Years: 35.00     Pack years: 105.00     Types: Cigarettes     Quit date:      Years since quittin.0    Smokeless tobacco: Not on file   Substance and Sexual Activity    Alcohol use: Not on file    Drug use: No    Sexual activity: Not on file         Review of Systems   All other systems reviewed and are negative.  Objective:     Vital Signs (Most Recent):  Temp: 97.5 °F (36.4 °C) (23 153)  Pulse: 94 (23 153)  Resp: 18 (23 153)  BP: 129/60 (23 153)  SpO2: 97 % (23) Vital Signs (24h Range):  Temp:  [97.3 °F (36.3 °C)-98.6 °F (37 °C)] 97.5 °F (36.4 °C)  Pulse:  [] 94  Resp:  [17-26] 18  SpO2:  [91 %-100 %] 97 %  BP: (112-140)/(60-81) 129/60     Weight: 70.3 kg (155 lb)  Body mass index is 25.02 kg/m².    No intake or output data in the 24 hours ending 23 1607    Physical Exam  Vitals and nursing note reviewed.   Constitutional:       General: She is not in acute distress.     Comments: Drowsy, but easily arousable to voice   Eyes:      General: No scleral icterus.     Extraocular Movements: Extraocular movements intact.      Conjunctiva/sclera: Conjunctivae normal.      Pupils: Pupils are equal, round, and reactive to light.   Cardiovascular:      Rate and Rhythm: Normal rate and regular rhythm.      Pulses: Normal pulses.      Heart sounds: No murmur heard.  Pulmonary:      Breath sounds: Wheezing and rales present. No rhonchi.      Comments: Mildly increased WOB, but no distress  Abdominal:      General: Abdomen is flat. There is no distension.      Palpations: Abdomen is soft.      Tenderness: There is no abdominal tenderness.    Musculoskeletal:      Cervical back: Normal range of motion and neck supple. No rigidity or tenderness.      Right lower leg: Edema present.      Left lower leg: Edema present.   Neurological:      Comments: Drowsy, but easily arousable.  Follows commands and answers questions appropriately.       Vents:  Oxygen Concentration (%): 32 (01/20/23 1338)    Lines/Drains/Airways       Peripheral Intravenous Line  Duration                  Peripheral IV - Single Lumen 01/19/23 2038 18 G Right Antecubital <1 day         Peripheral IV - Single Lumen 01/19/23 2044 18 G Left Antecubital <1 day                    Significant Labs:    CBC/Anemia Profile:  Recent Labs   Lab 01/19/23  2341 01/20/23  0406 01/20/23  0540   WBC 8.51 7.59 7.41   HGB 10.8* 10.0* 10.2*   HCT 34.6* 31.7* 31.8*    277 275   MCV 85 83 84   RDW 15.3* 15.3* 15.3*        Chemistries:  Recent Labs   Lab 01/19/23  2044 01/20/23  0406 01/20/23  0607    141  --    K 3.4* 3.4*  --     106  --    CO2 23 26  --    BUN 21 18  --    CREATININE 1.0 0.8  --    CALCIUM 9.3 8.5*  --    ALBUMIN 3.3* 2.9*  --    PROT 7.0 6.0  --    BILITOT 0.7 0.6  --    ALKPHOS 108 90  --    ALT 19 16  --    AST 20 18  --    MG  --   --  2.1       All pertinent labs within the past 24 hours have been reviewed.    Significant Imaging:   I have reviewed all pertinent imaging results/findings within the past 24 hours.      ABG  Recent Labs   Lab 01/20/23  1553   PH 7.478*   PO2 61*   PCO2 38.4   HCO3 28.5*   BE 5     Assessment/Plan:     New onset of congestive heart failure  Patient is identified as having Systolic (HFrEF) heart failure that is Acute. CHF is currently uncontrolled due to >3 pillow orthopnea and Rales/crackles on pulmonary exam. Latest ECHO performed and demonstrates- Results for orders placed during the hospital encounter of 01/19/23    Echo    Interpretation Summary  · The left ventricle is moderately enlarged with concentric hypertrophy and severely  decreased systolic function.  · The estimated ejection fraction is 25%.  · Grade II left ventricular diastolic dysfunction.  · There is severe left ventricular global hypokinesis.  · Normal right ventricular size with normal right ventricular systolic function.  · There is mild-to-moderate aortic valve stenosis.  · Aortic valve area is 0.88 cm2; peak velocity is 2.49 m/s; mean gradient is 17 mmHg.  · Moderate mitral regurgitation.  · Moderate tricuspid regurgitation.  · Elevated central venous pressure (15 mmHg).  · The estimated PA systolic pressure is 61 mmHg.  · There is pulmonary hypertension.  · Trivial circumferential pericardial effusion. Effusion is fluid.  . Continue Furosemide and monitor clinical status closely. Monitor on telemetry. Patient is on CHF pathway.  Monitor strict Is&Os and daily weights.  Place on fluid restriction of 2 L. Continue to stress to patient importance of self efficacy and  on diet for CHF. Last BNP reviewed- and noted below   Recent Labs   Lab 01/19/23  2341   BNP 2,511*   .  - diuresis, as above  - Cardiology following and planning ischemic evaluation when more stable  - respiratory support, as above    Shortness of breath  - likely 2/2 to volume overload  - continue diuresis  - I suspect her wheezing is 2/2 to pulmonary edema, but she does take Trelegy at home, so I'm going to go ahead and change her nebs to scheduled  - continue LABA/ICS nebs  - ABG without hypercapnea currently, but low threshold to recheck and apply Bipap if she declines further  - supplemental oxygen for goal SpO2 > 88%          Thank you for your consult. I will follow-up with patient. Please contact us if you have any additional questions.     Tres Pond MD  Pulmonology  [facility]

## 2023-01-20 NOTE — ASSESSMENT & PLAN NOTE
Patient is identified as having Systolic (HFrEF) heart failure that is Acute. CHF is currently uncontrolled due to >3 pillow orthopnea and Rales/crackles on pulmonary exam. Latest ECHO performed and demonstrates- Results for orders placed during the hospital encounter of 01/19/23    Echo    Interpretation Summary  · The left ventricle is moderately enlarged with concentric hypertrophy and severely decreased systolic function.  · The estimated ejection fraction is 25%.  · Grade II left ventricular diastolic dysfunction.  · There is severe left ventricular global hypokinesis.  · Normal right ventricular size with normal right ventricular systolic function.  · There is mild-to-moderate aortic valve stenosis.  · Aortic valve area is 0.88 cm2; peak velocity is 2.49 m/s; mean gradient is 17 mmHg.  · Moderate mitral regurgitation.  · Moderate tricuspid regurgitation.  · Elevated central venous pressure (15 mmHg).  · The estimated PA systolic pressure is 61 mmHg.  · There is pulmonary hypertension.  · Trivial circumferential pericardial effusion. Effusion is fluid.  . Continue Furosemide and monitor clinical status closely. Monitor on telemetry. Patient is on CHF pathway.  Monitor strict Is&Os and daily weights.  Place on fluid restriction of 2 L. Continue to stress to patient importance of self efficacy and  on diet for CHF. Last BNP reviewed- and noted below   Recent Labs   Lab 01/19/23  2341   BNP 2,511*   .  - diuresis, as above  - Cardiology following and planning ischemic evaluation when more stable  - respiratory support, as above

## 2023-01-20 NOTE — PLAN OF CARE
[facility]  Initial Discharge Assessment       Primary Care Provider: Karrie Birmingham MD    Admission Diagnosis: SOB (shortness of breath) [R06.02]  NSTEMI (non-ST elevated myocardial infarction) [I21.4]  Chest pain [R07.9]    Admission Date: 1/19/2023  Expected Discharge Date:     Discharge Barriers Identified: None    Payor: [employer] MANAGED MEDICARE / Plan: [employer] MEDICARE HMO / Product Type: Capitation /     Extended Emergency Contact Information  Primary Emergency Contact: Sherley Neumann   [place]  Mobile Phone: [phone]  Relation: Daughter    Discharge Plan A: Home with family [facility] [address]  [place] [place] [facility] [address]  Phone: [phone] Fax: [phone]      Initial Assessment (most recent)       Adult Discharge Assessment - 01/20/23 0906          Discharge Assessment    Assessment Type Discharge Planning Assessment     Confirmed/corrected address, phone number and insurance Yes     Confirmed Demographics Correct on Facesheet     Source of Information family     Communicated SHIVANI with patient/caregiver Date not available/Unable to determine     Reason For Admission Shortness of breath     People in Home child(kelly), adult;grandchild(kelly)     Facility Arrived From: home     Do you expect to return to your current living situation? Yes     Do you have help at home or someone to help you manage your care at home? Yes     Who are your caregiver(s) and their phone number(s)? daughter     Prior to hospitilization cognitive status: Alert/Oriented     Current cognitive status: Alert/Oriented     Home Accessibility wheelchair accessible     Equipment Currently Used at Home cane, straight;walker, standard     Readmission within 30 days? No     Patient currently being followed by outpatient case management? No     Do you currently have service(s) that help you manage your care at home? No     Do you take  prescription medications? Yes     Do you have prescription coverage? Yes     Coverage [employer] medicare     Do you have any problems affording any of your prescribed medications? No     Is the patient taking medications as prescribed? yes     Who is going to help you get home at discharge? daughter     How do you get to doctors appointments? family or friend will provide     Are you on dialysis? No     Do you take coumadin? No     Discharge Plan A Home with family     DME Needed Upon Discharge  none     Discharge Plan discussed with: Adult children     Discharge Barriers Identified None                   Anticipated DC dispo: home with family  Prior Level of Function: Independent with ADLs, Patient has cane and walker to use as needed  PCP: Karrie Mercado MD    Comments:  Swer met with patient and family at bedside to introduce role and discuss discharge planning. Patient lives with daughter and granddaughter who will also be help at home and can provide transport at time of discharge. Swer discharge needs depends on hospital progress. Swer will continue following to assist with other needs.     Patient's daughter inquired about a motorized scooter for patient, for outings.

## 2023-01-20 NOTE — SUBJECTIVE & OBJECTIVE
Interval History: See hospital course for today      Review of Systems   Unable to perform ROS: Acuity of condition (daughter provides collateral)   Constitutional:  Positive for activity change.   Respiratory:  Positive for shortness of breath.         Orthopnea   Cardiovascular:  Positive for leg swelling.   Neurological:  Positive for weakness.   Psychiatric/Behavioral:  Negative for sleep disturbance.    Objective:     Vital Signs (Most Recent):  Temp: 97.4 °F (36.3 °C) (01/20/23 1140)  Pulse: 107 (01/20/23 1341)  Resp: (!) 22 (01/20/23 1341)  BP: (!) 140/81 (01/20/23 1303)  SpO2: 95 % (01/20/23 1341)   Vital Signs (24h Range):  Temp:  [97.3 °F (36.3 °C)-98.6 °F (37 °C)] 97.4 °F (36.3 °C)  Pulse:  [] 107  Resp:  [17-26] 22  SpO2:  [91 %-100 %] 95 %  BP: (112-140)/(63-81) 140/81     Weight: 70.3 kg (155 lb)  Body mass index is 25.02 kg/m².  No intake or output data in the 24 hours ending 01/20/23 1529   Physical Exam  Vitals and nursing note reviewed. Exam conducted with a chaperone present (daughter).   Constitutional:       General: She is sleeping. She is not in acute distress.     Appearance: She is normal weight. She is ill-appearing. She is not toxic-appearing.      Interventions: Nasal cannula in place.   HENT:      Head: Normocephalic and atraumatic.   Cardiovascular:      Rate and Rhythm: Tachycardia present.   Pulmonary:      Effort: Tachypnea and respiratory distress present.      Breath sounds: Decreased air movement present.   Abdominal:      Palpations: Abdomen is soft.      Tenderness: There is no abdominal tenderness.   Genitourinary:     Comments: sullivan  Musculoskeletal:      Right lower leg: Edema present.      Left lower leg: Edema present.   Skin:     General: Skin is warm.      Coloration: Skin is pale.   Neurological:      Mental Status: She is lethargic.      Motor: Weakness present.       Significant Labs: All pertinent labs within the past 24 hours have been reviewed.  ABGs: No  results for input(s): PH, PCO2, HCO3, POCSATURATED, BE, TOTALHB, COHB, METHB, O2HB, POCFIO2, PO2 in the last 48 hours.  CBC:   Recent Labs   Lab 01/19/23  2341 01/20/23  0406 01/20/23  0540   WBC 8.51 7.59 7.41   HGB 10.8* 10.0* 10.2*   HCT 34.6* 31.7* 31.8*    277 275     CMP:   Recent Labs   Lab 01/19/23 2044 01/20/23  0406    141   K 3.4* 3.4*    106   CO2 23 26   * 107   BUN 21 18   CREATININE 1.0 0.8   CALCIUM 9.3 8.5*   PROT 7.0 6.0   ALBUMIN 3.3* 2.9*   BILITOT 0.7 0.6   ALKPHOS 108 90   AST 20 18   ALT 19 16   ANIONGAP 13 9     Cardiac Markers:   Recent Labs   Lab 01/19/23 2341   BNP 2,511*     Troponin:   Recent Labs   Lab 01/19/23 2044 01/19/23  2341 01/20/23  0607   TROPONINI 0.745* 0.758* 1.019*       Significant Imaging: I have reviewed all pertinent imaging results/findings within the past 24 hours.  CXR: I have reviewed all pertinent results/findings within the past 24 hours and my personal findings are:  pleura effusions  Echo: I have reviewed all pertinent results/findings within the past 24 hours and my personal findings are:  ejection fraction 25%  V/q scan low probability for pulmonary embolism

## 2023-01-20 NOTE — ASSESSMENT & PLAN NOTE
Patient is identified as having heart failure that is new onset. CHF is currently uncontrolled due to Continued edema of extremities, >3 pillow orthopnea, Rales/crackles on pulmonary exam and Pulmonary edema/pleural effusion on CXR. Latest ECHO performed and demonstrates- No results found for this or any previous visit.  . Continue Beta Blocker and Furosemide and monitor clinical status closely. Monitor on telemetry. Patient is on CHF pathway.  Monitor strict Is&Os and daily weights.  Place on fluid restriction of 1.5 L. Continue to stress to patient importance of self efficacy and  on diet for CHF. Last BNP reviewed- and noted below   Recent Labs   Lab 01/19/23  2341   BNP 2,511*     Intravenous diuresising  Cardiology consulted  Critical care consult for higher level of care  abg pending  If hypercapnic, transfer to icu for nippv

## 2023-01-20 NOTE — CONSULTS
[facility]  Cardiology  Consult Note    Patient Name: Violeta Scherer  MRN: [id number]  Admission Date: 1/19/2023  Hospital Length of Stay: 1 days  Code Status: Full Code   Attending Provider: Calvin Schrader MD   Consulting Provider: Federico Reyna MD  Primary Care Physician: Karrie Birmingham MD  Principal Problem:<principal problem not specified>    Patient information was obtained from patient and ER records.     Inpatient consult to Cardiology  Consult performed by: Federico Reyna MD  Consult ordered by: Calvin Schrader MD        Subjective:     Chief Complaint:  SOB and epigastric pain     HPI:   Violeta Scherer is a 72 y.o. female with a PMH  has a past medical history of Hypertension, Other pulmonary embolism without acute cor pulmonale, and Stroke. who presented to the ED for further evaluation of progressive worsening shortness of breath x2 days duration.  Patient reportedly had acute onset and progressively worsening shortness of breath over the past 3 days with patient now reporting 3-4 pillow orthopnea, PND, dyspnea on minimal exertion, and bilateral lower extremity pitting edema.  Patient denies history of heart failure, COPD, of prior MIs but did have history of previous stroke/TIA and PE's.  Aggravating factors included those noted above with no known alleviating factors.  All other review of systems negative including for lightheadedness, dizziness, headache, visual changes, fever, chills, sweats, nausea, vomiting, chest pain, abdominal pain, dysuria, hematuria, melena, hematochezia, constipation, diarrhea, or onset neurological deficits.  Prior to onset of symptoms, patient reported being in her usual state health no other concerns or complaints.  Initial workup in the ED revealed patient to have evidence consistent with signs/symptoms of new onset CHF and NSTEMI.  Hospital Medicine consulted by ED staff for admission for continued medical management.     Patient seen and examined in room  with daughter present. Increased edema over 3 weeks. Mild epigastric discomfort now resolved. Evidence of NSTEMI and chf with reduced LVEF 25% by exam. Unable to lie flat and will defer cath until improved by Monday or weekend if needed.      Past Medical History:   Diagnosis Date    Hypertension     Other pulmonary embolism without acute cor pulmonale     approx greater than 4 years    Stroke     TIA       Past Surgical History:   Procedure Laterality Date    TONSILLECTOMY         Review of patient's allergies indicates:   Allergen Reactions    Penicillins Hives    Bactrim [sulfamethoxazole-trimethoprim] Hives       No current facility-administered medications on file prior to encounter.     Current Outpatient Medications on File Prior to Encounter   Medication Sig    apixaban (ELIQUIS) 2.5 mg Tab Take 2.5 mg by mouth.    atorvastatin (LIPITOR) 20 MG tablet Take 20 mg by mouth once daily.    docusate sodium (COLACE) 100 MG capsule Take 1 capsule (100 mg total) by mouth 2 (two) times daily as needed for Constipation.    EScitalopram oxalate (LEXAPRO) 10 MG tablet Take 10 mg by mouth once daily.    fluticasone-umeclidin-vilanter (TRELEGY ELLIPTA) 100-62.5-25 mcg DsDv Inhale 1 puff into the lungs.    pantoprazole (PROTONIX) 40 MG tablet Take 40 mg by mouth 2 (two) times a day.    albuterol (PROVENTIL/VENTOLIN HFA) 90 mcg/actuation inhaler Inhale into the lungs daily as needed.    HYDROcodone-acetaminophen (NORCO) 7.5-325 mg per tablet Take 1 tablet by mouth every 6 (six) hours as needed for Pain.    ondansetron (ZOFRAN) 4 MG tablet Take 1 tablet (4 mg total) by mouth every 6 (six) hours.     Family History    None       Tobacco Use    Smoking status: Former     Packs/day: 3.00     Years: 35.00     Pack years: 105.00     Types: Cigarettes     Quit date:      Years since quittin.0    Smokeless tobacco: Not on file   Substance and Sexual Activity    Alcohol use: Not on file    Drug use: No     Sexual activity: Not on file     Review of Systems   Constitutional: Positive for malaise/fatigue. Negative for chills, diaphoresis, night sweats, weight gain and weight loss.   HENT:  Negative for congestion, hoarse voice, sore throat and stridor.    Eyes:  Negative for double vision and pain.   Cardiovascular:  Positive for chest pain, dyspnea on exertion, leg swelling and orthopnea. Negative for claudication, cyanosis, irregular heartbeat, near-syncope, palpitations, paroxysmal nocturnal dyspnea and syncope.   Respiratory:  Negative for cough, hemoptysis, shortness of breath, sleep disturbances due to breathing, snoring, sputum production and wheezing.    Endocrine: Negative for cold intolerance, heat intolerance and polydipsia.   Hematologic/Lymphatic: Negative for bleeding problem. Does not bruise/bleed easily.   Skin:  Negative for color change, dry skin and rash.   Musculoskeletal:  Negative for joint swelling and muscle cramps.   Gastrointestinal:  Negative for bloating, abdominal pain, constipation, diarrhea, dysphagia, melena, nausea and vomiting.   Genitourinary:  Negative for flank pain and urgency.   Neurological:  Negative for dizziness, focal weakness, headaches, light-headedness, loss of balance, seizures and weakness.   Psychiatric/Behavioral:  Negative for altered mental status and memory loss. The patient is not nervous/anxious.    Objective:     Vital Signs (Most Recent):  Temp: 97.4 °F (36.3 °C) (01/20/23 1140)  Pulse: 100 (01/20/23 1140)  Resp: 18 (01/20/23 1140)  BP: (!) 140/81 (01/20/23 1140)  SpO2: (!) 94 % (01/20/23 1140)   Vital Signs (24h Range):  Temp:  [97.3 °F (36.3 °C)-98.6 °F (37 °C)] 97.4 °F (36.3 °C)  Pulse:  [] 100  Resp:  [17-26] 18  SpO2:  [91 %-100 %] 94 %  BP: (112-140)/(63-81) 140/81     Weight: 70.3 kg (155 lb)  Body mass index is 25.02 kg/m².    SpO2: (!) 94 %       No intake or output data in the 24 hours ending 01/20/23 1207    Lines/Drains/Airways       Peripheral  Intravenous Line  Duration                  Peripheral IV - Single Lumen 01/19/23 2038 18 G Right Antecubital <1 day         Peripheral IV - Single Lumen 01/19/23 2044 18 G Left Antecubital <1 day                    Physical Exam  Eyes:      Pupils: Pupils are equal, round, and reactive to light.   Neck:      Trachea: No tracheal deviation.   Cardiovascular:      Rate and Rhythm: Regular rhythm. Tachycardia present.      Pulses: Intact distal pulses.           Carotid pulses are 2+ on the right side and 2+ on the left side.       Radial pulses are 2+ on the right side and 2+ on the left side.        Femoral pulses are 2+ on the right side and 2+ on the left side.       Popliteal pulses are 2+ on the right side and 2+ on the left side.        Dorsalis pedis pulses are 2+ on the right side and 2+ on the left side.        Posterior tibial pulses are 2+ on the right side and 2+ on the left side.      Heart sounds: No murmur heard.    No friction rub. Gallop present.   Pulmonary:      Effort: Pulmonary effort is normal. No respiratory distress.      Breath sounds: No stridor. Rales present. No wheezing.   Chest:      Chest wall: No tenderness.   Abdominal:      General: There is no distension.      Tenderness: There is no abdominal tenderness. There is no rebound.   Musculoskeletal:         General: No tenderness.      Cervical back: Normal range of motion.      Right lower leg: Edema present.      Left lower leg: Edema present.   Skin:     General: Skin is warm and dry.   Neurological:      Mental Status: She is alert and oriented to person, place, and time.       Significant Labs: CMP   Recent Labs   Lab 01/19/23 2044 01/20/23  0406    141   K 3.4* 3.4*    106   CO2 23 26   * 107   BUN 21 18   CREATININE 1.0 0.8   CALCIUM 9.3 8.5*   PROT 7.0 6.0   ALBUMIN 3.3* 2.9*   BILITOT 0.7 0.6   ALKPHOS 108 90   AST 20 18   ALT 19 16   ANIONGAP 13 9   , CBC   Recent Labs   Lab 01/19/23  2341 01/20/23  0406  01/20/23  0540   WBC 8.51 7.59 7.41   HGB 10.8* 10.0* 10.2*   HCT 34.6* 31.7* 31.8*    277 275   , and Troponin   Recent Labs   Lab 01/19/23  2044 01/19/23  2341 01/20/23  0607   TROPONINI 0.745* 0.758* 1.019*       Significant Imaging: Echocardiogram: Transthoracic echo (TTE) complete (Cupid Only):   Results for orders placed or performed during the hospital encounter of 01/19/23   Echo   Result Value Ref Range    BSA 1.81 m2    TDI SEPTAL 0.09 m/s    LV LATERAL E/E' RATIO 15.75 m/s    LV SEPTAL E/E' RATIO 14.00 m/s    LA WIDTH 3.80 cm    IVC diameter 1.69 cm    Left Ventricular Outflow Tract Mean Velocity 0.63 cm/s    Left Ventricular Outflow Tract Mean Gradient 1.70 mmHg    TDI LATERAL 0.08 m/s    LVIDd 5.08 3.5 - 6.0 cm    IVS 1.51 (A) 0.6 - 1.1 cm    Posterior Wall 1.40 (A) 0.6 - 1.1 cm    Ao root annulus 2.46 cm    LVIDs 4.49 (A) 2.1 - 4.0 cm    FS 12 28 - 44 %    LA volume 47.84 cm3    Sinus 2.41 cm    STJ 2.53 cm    Ascending aorta 2.70 cm    LV mass 315.92 g    LA size 3.52 cm    TAPSE 1.78 cm    Left Ventricle Relative Wall Thickness 0.55 cm    AV mean gradient 17 mmHg    AV valve area 0.88 cm2    AV Velocity Ratio 0.30     AV index (prosthetic) 0.37     MV valve area p 1/2 method 3.23 cm2    E/A ratio 1.19     Mean e' 0.09 m/s    E wave deceleration time 234.50 msec    IVRT 62.80 msec    LVOT diameter 1.74 cm    LVOT area 2.4 cm2    LVOT peak sarmad 0.74 m/s    LVOT peak VTI 17.50 cm    Ao peak sarmad 2.49 m/s    Ao VTI 47.1 cm    RVOT peak sarmad 0.82 m/s    RVOT peak VTI 16.4 cm    Mr max sarmad 5.20 m/s    LVOT stroke volume 41.59 cm3    AV peak gradient 25 mmHg    PV mean gradient 2.05 mmHg    E/E' ratio 14.82 m/s    MV Peak E Sarmad 1.26 m/s    TR Max Sarmad 3.40 m/s    MV stenosis pressure 1/2 time 68.01 ms    MV Peak A Sarmad 1.06 m/s    LV Systolic Volume 91.93 mL    LV Systolic Volume Index 51.4 mL/m2    LV Diastolic Volume 122.55 mL    LV Diastolic Volume Index 68.46 mL/m2    LA Volume Index 26.7 mL/m2    LV  Mass Index 176 g/m2    RA Major Axis 4.22 cm    Left Atrium Minor Axis 4.31 cm    Left Atrium Major Axis 4.11 cm    Triscuspid Valve Regurgitation Peak Gradient 46 mmHg    RA Width 3.03 cm    Right Atrial Pressure (from IVC) 15 mmHg    EF 25 %    TV rest pulmonary artery pressure 61 mmHg    Narrative    · The left ventricle is moderately enlarged with concentric hypertrophy   and severely decreased systolic function.  · The estimated ejection fraction is 25%.  · Grade II left ventricular diastolic dysfunction.  · There is severe left ventricular global hypokinesis.  · Normal right ventricular size with normal right ventricular systolic   function.  · There is mild-to-moderate aortic valve stenosis.  · Aortic valve area is 0.88 cm2; peak velocity is 2.49 m/s; mean gradient   is 17 mmHg.  · Moderate mitral regurgitation.  · Moderate tricuspid regurgitation.  · Elevated central venous pressure (15 mmHg).  · The estimated PA systolic pressure is 61 mmHg.  · There is pulmonary hypertension.  · Trivial circumferential pericardial effusion. Effusion is fluid.        Assessment and Plan:     NSTEMI (non-ST elevated myocardial infarction)  Continue heparin and ASA  Plan LHC when feasible    Elevated troponin  NSTEMI, inferior EKG changes and evidence of diffuse LV dysfunction by echo  Continue Heparin IV  ASA  Plan LHC when able to lie flat  Now with CHF and edema    New onset of congestive heart failure  Patient is identified as having Systolic (HFrEF) heart failure that is Acute. CHF is currently uncontrolled due to Continued edema of extremities. Latest ECHO performed and demonstrates- Results for orders placed during the hospital encounter of 01/19/23    Echo    Interpretation Summary  · The left ventricle is moderately enlarged with concentric hypertrophy and severely decreased systolic function.  · The estimated ejection fraction is 25%.  · Grade II left ventricular diastolic dysfunction.  · There is severe left  ventricular global hypokinesis.  · Normal right ventricular size with normal right ventricular systolic function.  · There is mild-to-moderate aortic valve stenosis.  · Aortic valve area is 0.88 cm2; peak velocity is 2.49 m/s; mean gradient is 17 mmHg.  · Moderate mitral regurgitation.  · Moderate tricuspid regurgitation.  · Elevated central venous pressure (15 mmHg).  · The estimated PA systolic pressure is 61 mmHg.  · There is pulmonary hypertension.  · Trivial circumferential pericardial effusion. Effusion is fluid.  . Continue Furosemide and monitor clinical status closely. Monitor on telemetry. Patient is on CHF pathway.  Monitor strict Is&Os and daily weights.  Place on fluid restriction of 2 L. Continue to stress to patient importance of self efficacy and  on diet for CHF. Last BNP reviewed- and noted below   Recent Labs   Lab 01/19/23 2341   BNP 2,511*   .  Plan continued diuresis and plan [facility]    Shortness of breath  See CHF  Unable to lie flat at this time    History of pulmonary embolus (PE)  stable        VTE Risk Mitigation (From admission, onward)         Ordered     Reason for No Pharmacological VTE Prophylaxis  Once        Question:  Reasons:  Answer:  Physician Provided (leave comment)  Comment:  on heparin drip    01/19/23 2246     IP VTE HIGH RISK PATIENT  Once         01/19/23 2246     Place sequential compression device  Until discontinued         01/19/23 2246     heparin 25,000 units in dextrose 5% (100 units/ml) IV bolus from bag - ADDITIONAL PRN BOLUS - 60 units/kg (max bolus 4000 units)  As needed (PRN)        Question:  Heparin Infusion Adjustment (DO NOT MODIFY ANSWER)  Answer:  [email or web address]\epic\Images\Pharmacy\HeparinInfusions\heparin LOW INTENSITY nomogram for [facility] [id number]    01/19/23 2126     heparin 25,000 units in dextrose 5% (100 units/ml) IV bolus from bag - ADDITIONAL PRN BOLUS - 30 units/kg (max bolus 4000 units)  As needed (PRN)        Question:  Heparin Infusion  Adjustment (DO NOT MODIFY ANSWER)  Answer:  [email or web address]\epic\Images\Pharmacy\HeparinInfusions\heparin LOW INTENSITY nomogram for [facility] [id number]    01/19/23 2126     heparin 25,000 units in dextrose 5% 250 mL (100 units/mL) infusion LOW INTENSITY nomogram - [facility]  Continuous        Question Answer Comment   Heparin Infusion Adjustment (DO NOT MODIFY ANSWER) [email or web address]\epic\Images\Pharmacy\HeparinInfusions\heparin LOW INTENSITY nomogram for [facility] [id number]    Begin at (in units/kg/hr) 12        01/19/23 2126                Thank you for your consult. I will follow-up with patient. Please contact us if you have any additional questions.    Federico Renya MD  Cardiology   [facility] Med [facility]

## 2023-01-20 NOTE — SUBJECTIVE & OBJECTIVE
Past Medical History:   Diagnosis Date    Hypertension     Other pulmonary embolism without acute cor pulmonale     approx greater than 4 years    Stroke     TIA       Past Surgical History:   Procedure Laterality Date    TONSILLECTOMY         Review of patient's allergies indicates:   Allergen Reactions    Penicillins Hives    Bactrim [sulfamethoxazole-trimethoprim] Hives       No current facility-administered medications on file prior to encounter.     Current Outpatient Medications on File Prior to Encounter   Medication Sig    apixaban (ELIQUIS) 2.5 mg Tab Take 2.5 mg by mouth.    atorvastatin (LIPITOR) 20 MG tablet Take 20 mg by mouth once daily.    docusate sodium (COLACE) 100 MG capsule Take 1 capsule (100 mg total) by mouth 2 (two) times daily as needed for Constipation.    EScitalopram oxalate (LEXAPRO) 10 MG tablet Take 10 mg by mouth once daily.    fluticasone-umeclidin-vilanter (TRELEGY ELLIPTA) 100-62.5-25 mcg DsDv Inhale 1 puff into the lungs.    pantoprazole (PROTONIX) 40 MG tablet Take 40 mg by mouth 2 (two) times a day.    albuterol (PROVENTIL/VENTOLIN HFA) 90 mcg/actuation inhaler Inhale into the lungs daily as needed.    HYDROcodone-acetaminophen (NORCO) 7.5-325 mg per tablet Take 1 tablet by mouth every 6 (six) hours as needed for Pain.    ondansetron (ZOFRAN) 4 MG tablet Take 1 tablet (4 mg total) by mouth every 6 (six) hours.     Family History    None       Tobacco Use    Smoking status: Former     Packs/day: 3.00     Years: 35.00     Pack years: 105.00     Types: Cigarettes     Quit date:      Years since quittin.0    Smokeless tobacco: Not on file   Substance and Sexual Activity    Alcohol use: Not on file    Drug use: No    Sexual activity: Not on file     Review of Systems   All other systems reviewed and are negative.  Objective:     Vital Signs (Most Recent):  Temp: 98 °F (36.7 °C) (230)  Pulse: 92 (23 0200)  Resp: 20 (23 0200)  BP: 130/68 (23  2320)  SpO2: 95 % (01/20/23 0200) Vital Signs (24h Range):  Temp:  [97.7 °F (36.5 °C)-98 °F (36.7 °C)] 98 °F (36.7 °C)  Pulse:  [] 92  Resp:  [17-26] 20  SpO2:  [91 %-100 %] 95 %  BP: (122-131)/(67-72) 130/68     Weight: 70.6 kg (155 lb 10.3 oz)  Body mass index is 25.12 kg/m².    Physical Exam  Vitals reviewed.   Constitutional:       General: She is in acute distress.      Appearance: Normal appearance. She is normal weight. She is ill-appearing. She is not toxic-appearing or diaphoretic.   HENT:      Head: Normocephalic and atraumatic.      Right Ear: External ear normal.      Left Ear: External ear normal.      Nose: Nose normal. No congestion or rhinorrhea.      Mouth/Throat:      Mouth: Mucous membranes are moist.      Pharynx: Oropharynx is clear. No oropharyngeal exudate or posterior oropharyngeal erythema.   Eyes:      General: No scleral icterus.     Extraocular Movements: Extraocular movements intact.      Conjunctiva/sclera: Conjunctivae normal.      Pupils: Pupils are equal, round, and reactive to light.   Neck:      Vascular: No carotid bruit.   Cardiovascular:      Rate and Rhythm: Normal rate and regular rhythm.      Pulses: Normal pulses.      Heart sounds: Normal heart sounds. No murmur heard.    No friction rub. No gallop.   Pulmonary:      Effort: Respiratory distress present.      Breath sounds: No stridor. Wheezing and rales present. No rhonchi.      Comments: Patient with increased workup rhythm without signs of impending respiratory failure or use of accessory muscles noted.  Patient has bilateral acute mary wheezes and bibasilar crackles lisa auscultation.  Chest:      Chest wall: No tenderness.   Abdominal:      General: Abdomen is flat. Bowel sounds are normal. There is no distension.      Palpations: Abdomen is soft.      Tenderness: There is no abdominal tenderness. There is no right CVA tenderness, left CVA tenderness, guarding or rebound.      Hernia: No hernia is present.    Musculoskeletal:         General: Swelling present. No tenderness, deformity or signs of injury. Normal range of motion.      Cervical back: Normal range of motion and neck supple. No rigidity or tenderness.      Right lower leg: Edema present.      Left lower leg: Edema present.      Comments: Patient noted to have 3+ bilateral lower extremity pitting edema    Lymphadenopathy:      Cervical: No cervical adenopathy.   Skin:     General: Skin is warm and dry.      Capillary Refill: Capillary refill takes less than 2 seconds.      Coloration: Skin is not jaundiced or pale.      Findings: No bruising, erythema, lesion or rash.   Neurological:      General: No focal deficit present.      Mental Status: She is alert and oriented to person, place, and time. Mental status is at baseline.      Cranial Nerves: No cranial nerve deficit.      Sensory: No sensory deficit.      Motor: No weakness.      Coordination: Coordination normal.   Psychiatric:         Mood and Affect: Mood normal.         Behavior: Behavior normal.         Thought Content: Thought content normal.         Judgment: Judgment normal.         CRANIAL NERVES     CN III, IV, VI   Pupils are equal, round, and reactive to light.     Significant Labs: All pertinent labs within the past 24 hours have been reviewed.    Significant Imaging: I have reviewed all pertinent imaging results/findings within the past 24 hours.    LABS:  Recent Results (from the past 24 hour(s))   CBC auto differential    Collection Time: 01/19/23  8:44 PM   Result Value Ref Range    WBC 8.30 3.90 - 12.70 K/uL    RBC 4.19 4.00 - 5.40 M/uL    Hemoglobin 11.2 (L) 12.0 - 16.0 g/dL    Hematocrit 35.5 (L) 37.0 - 48.5 %    MCV 85 82 - 98 fL    MCH 26.7 (L) 27.0 - 31.0 pg    MCHC 31.5 (L) 32.0 - 36.0 g/dL    RDW 15.3 (H) 11.5 - 14.5 %    Platelets 391 150 - 450 K/uL    MPV 10.2 9.2 - 12.9 fL    Immature Granulocytes 0.2 0.0 - 0.5 %    Gran # (ANC) 5.7 1.8 - 7.7 K/uL    Immature Grans (Abs) 0.02  0.00 - 0.04 K/uL    Lymph # 1.8 1.0 - 4.8 K/uL    Mono # 0.7 0.3 - 1.0 K/uL    Eos # 0.0 0.0 - 0.5 K/uL    Baso # 0.03 0.00 - 0.20 K/uL    nRBC 0 0 /100 WBC    Gran % 69.1 38.0 - 73.0 %    Lymph % 21.6 18.0 - 48.0 %    Mono % 8.6 4.0 - 15.0 %    Eosinophil % 0.1 0.0 - 8.0 %    Basophil % 0.4 0.0 - 1.9 %    Differential Method Automated    Comprehensive metabolic panel    Collection Time: 01/19/23  8:44 PM   Result Value Ref Range    Sodium 140 136 - 145 mmol/L    Potassium 3.4 (L) 3.5 - 5.1 mmol/L    Chloride 104 95 - 110 mmol/L    CO2 23 23 - 29 mmol/L    Glucose 123 (H) 70 - 110 mg/dL    BUN 21 8 - 23 mg/dL    Creatinine 1.0 0.5 - 1.4 mg/dL    Calcium 9.3 8.7 - 10.5 mg/dL    Total Protein 7.0 6.0 - 8.4 g/dL    Albumin 3.3 (L) 3.5 - 5.2 g/dL    Total Bilirubin 0.7 0.1 - 1.0 mg/dL    Alkaline Phosphatase 108 55 - 135 U/L    AST 20 10 - 40 U/L    ALT 19 10 - 44 U/L    Anion Gap 13 8 - 16 mmol/L    eGFR >60 >60 mL/min/1.73 m^2   Troponin I    Collection Time: 01/19/23  8:44 PM   Result Value Ref Range    Troponin I 0.745 (H) 0.000 - 0.026 ng/mL   Brain natriuretic peptide    Collection Time: 01/19/23  8:44 PM   Result Value Ref Range    BNP 1,940 (H) 0 - 99 pg/mL   APTT    Collection Time: 01/19/23  9:34 PM   Result Value Ref Range    aPTT 26.1 21.0 - 32.0 sec   Protime-INR    Collection Time: 01/19/23  9:34 PM   Result Value Ref Range    Prothrombin Time 11.6 9.0 - 12.5 sec    INR 1.1 0.8 - 1.2   BNP    Collection Time: 01/19/23 11:41 PM   Result Value Ref Range    BNP 2,511 (H) 0 - 99 pg/mL   CBC auto differential    Collection Time: 01/19/23 11:41 PM   Result Value Ref Range    WBC 8.51 3.90 - 12.70 K/uL    RBC 4.07 4.00 - 5.40 M/uL    Hemoglobin 10.8 (L) 12.0 - 16.0 g/dL    Hematocrit 34.6 (L) 37.0 - 48.5 %    MCV 85 82 - 98 fL    MCH 26.5 (L) 27.0 - 31.0 pg    MCHC 31.2 (L) 32.0 - 36.0 g/dL    RDW 15.3 (H) 11.5 - 14.5 %    Platelets 349 150 - 450 K/uL    MPV 10.2 9.2 - 12.9 fL    Immature Granulocytes 0.2 0.0 -  0.5 %    Gran # (ANC) 5.7 1.8 - 7.7 K/uL    Immature Grans (Abs) 0.02 0.00 - 0.04 K/uL    Lymph # 2.0 1.0 - 4.8 K/uL    Mono # 0.8 0.3 - 1.0 K/uL    Eos # 0.0 0.0 - 0.5 K/uL    Baso # 0.04 0.00 - 0.20 K/uL    nRBC 0 0 /100 WBC    Gran % 66.3 38.0 - 73.0 %    Lymph % 23.3 18.0 - 48.0 %    Mono % 9.3 4.0 - 15.0 %    Eosinophil % 0.4 0.0 - 8.0 %    Basophil % 0.5 0.0 - 1.9 %    Differential Method Automated    Troponin I #2    Collection Time: 01/19/23 11:41 PM   Result Value Ref Range    Troponin I 0.758 (H) 0.000 - 0.026 ng/mL       RADIOLOGY  X-Ray Chest AP Portable    Result Date: 1/19/2023  EXAMINATION: XR CHEST AP PORTABLE CLINICAL HISTORY: sob; TECHNIQUE: Single frontal view of the chest was performed. COMPARISON: None FINDINGS: Gaseous distension of the stomach.  Interstitial prominence.  Mild cardiomegaly with atherosclerotic changes.  Blunting of the costophrenic sulci. Bones are intact.     Mild cardiomegaly with mild perihilar interstitial opacity and this likely trace pleural effusions or pleural thickening.  Recommend correlation to element of CHF. Electronically signed by: Ganesh Leung Date:    01/19/2023 Time:    20:43      EKG    MICROBIOLOGY    MDM

## 2023-01-20 NOTE — ASSESSMENT & PLAN NOTE
Currently normotensive.  Plan:  -Optimize pain control   -Continue home medications, titrate as needed   -Monitor BP  -Low salt/cardiac diet when not NPO  -continue workup and treatment of CHF  -IV hydralazine prn for SBP>160 or DBP>90

## 2023-01-20 NOTE — ASSESSMENT & PLAN NOTE
Patient presented with worsening dyspnea/shortness of breath as noted above likely secondary to new onset CHF.  Patient currently saturating % on room air.  Respiratory rate ranging from 17-26.  She remains afebrile without leukocytosis with a/HI baseline currently measuring 10.8/34.6.  BNP elevated at 1940 with chest x-ray revealing mild cardiomegaly with mild perihilar interstitial opacity and this likely trace pleural effusions or pleural thickening.  D-dimer not obtained in the ED.  History of PE in the past but not severely hypoxic or tachycardic with tachypnea improving following diuresing.  Patient provided 20 mg IV Lasix x1.  Cardiology consulted and following patient.  Plan:  -continue diuresing as needed  -monitor Is/Os  -daily weights  -maintain potassium of 4.0 and mag of 2.0  -f/u echo  -f/u cardiology  -will obtain D-dimer, consider CTA pending results to definitively exclude PE given history

## 2023-01-20 NOTE — ASSESSMENT & PLAN NOTE
Patient presented with worsening dyspnea/shortness of breath as noted above likely secondary to new onset CHF.  Patient currently saturating % on room air.  Respiratory rate ranging from 17-26.  She remains afebrile without leukocytosis with a/HI baseline currently measuring 10.8/34.6.  BNP elevated at 1940 with chest x-ray revealing mild cardiomegaly with mild perihilar interstitial opacity and this likely trace pleural effusions or pleural thickening.  D-dimer not obtained in the ED.  History of PE in the past but not severely hypoxic or tachycardic with tachypnea improving following diuresing.  Patient provided 20 mg IV Lasix x1.  Cardiology consulted and following patient.  Plan:  See congestive heart failure plan

## 2023-01-20 NOTE — PROGRESS NOTES
[facility] Medicine  Progress Note    Patient Name: Violeta Scherer  MRN: [id number]  Patient Class: IP- Inpatient   Admission Date: 1/19/2023  Length of Stay: 1 days  Attending Physician: Calvin Schrader MD  Primary Care Provider: Karrie Birmingham MD        Subjective:     Principal Problem:<principal problem not specified>        HPI:  Violeta Scherer is a 72 y.o. female with a PMH  has a past medical history of Hypertension, Other pulmonary embolism without acute cor pulmonale, and Stroke. who presented to the ED for further evaluation of progressive worsening shortness of breath x2 days duration.  Patient reportedly had acute onset and progressively worsening shortness of breath over the past 3 days with patient now reporting 3-4 pillow orthopnea, PND, dyspnea on minimal exertion, and bilateral lower extremity pitting edema.  Patient denies history of heart failure, COPD, of prior MIs but did have history of previous stroke/TIA and PE's.  Aggravating factors included those noted above with no known alleviating factors.  All other review of systems negative including for lightheadedness, dizziness, headache, visual changes, fever, chills, sweats, nausea, vomiting, chest pain, abdominal pain, dysuria, hematuria, melena, hematochezia, constipation, diarrhea, or onset neurological deficits.  Prior to onset of symptoms, patient reported being in her usual state health no other concerns or complaints.  Initial workup in the ED revealed patient to have evidence consistent with signs/symptoms of new onset CHF and NSTEMI.  Hospital Medicine consulted by ED staff for admission for continued medical management.      PCP: Karrie Birmingham        Overview/Hospital Course:  1/20 admitted for new onset congestive heart failure exacerbation. Echocardiogram with ejection fraction 25%. Cardiology consulted and diuresing. Family endorses orthopnea, increased lower extremity edema. Daughter reports patient did not want  to come to hospital. Attempted goals of care discussion but patient unable to participate. Critical care consult. Abg pending      Interval History: See hospital course for today      Review of Systems   Unable to perform ROS: Acuity of condition (daughter provides collateral)   Constitutional:  Positive for activity change.   Respiratory:  Positive for shortness of breath.         Orthopnea   Cardiovascular:  Positive for leg swelling.   Neurological:  Positive for weakness.   Psychiatric/Behavioral:  Negative for sleep disturbance.    Objective:     Vital Signs (Most Recent):  Temp: 97.4 °F (36.3 °C) (01/20/23 1140)  Pulse: 107 (01/20/23 1341)  Resp: (!) 22 (01/20/23 1341)  BP: (!) 140/81 (01/20/23 1303)  SpO2: 95 % (01/20/23 1341)   Vital Signs (24h Range):  Temp:  [97.3 °F (36.3 °C)-98.6 °F (37 °C)] 97.4 °F (36.3 °C)  Pulse:  [] 107  Resp:  [17-26] 22  SpO2:  [91 %-100 %] 95 %  BP: (112-140)/(63-81) 140/81     Weight: 70.3 kg (155 lb)  Body mass index is 25.02 kg/m².  No intake or output data in the 24 hours ending 01/20/23 1529   Physical Exam  Vitals and nursing note reviewed. Exam conducted with a chaperone present (daughter).   Constitutional:       General: She is sleeping. She is not in acute distress.     Appearance: She is normal weight. She is ill-appearing. She is not toxic-appearing.      Interventions: Nasal cannula in place.   HENT:      Head: Normocephalic and atraumatic.   Cardiovascular:      Rate and Rhythm: Tachycardia present.   Pulmonary:      Effort: Tachypnea and respiratory distress present.      Breath sounds: Decreased air movement present.   Abdominal:      Palpations: Abdomen is soft.      Tenderness: There is no abdominal tenderness.   Genitourinary:     Comments: sullivan  Musculoskeletal:      Right lower leg: Edema present.      Left lower leg: Edema present.   Skin:     General: Skin is warm.      Coloration: Skin is pale.   Neurological:      Mental Status: She is lethargic.       Motor: Weakness present.       Significant Labs: All pertinent labs within the past 24 hours have been reviewed.  ABGs: No results for input(s): PH, PCO2, HCO3, POCSATURATED, BE, TOTALHB, COHB, METHB, O2HB, POCFIO2, PO2 in the last 48 hours.  CBC:   Recent Labs   Lab 01/19/23  2341 01/20/23  0406 01/20/23  0540   WBC 8.51 7.59 7.41   HGB 10.8* 10.0* 10.2*   HCT 34.6* 31.7* 31.8*    277 275     CMP:   Recent Labs   Lab 01/19/23  2044 01/20/23  0406    141   K 3.4* 3.4*    106   CO2 23 26   * 107   BUN 21 18   CREATININE 1.0 0.8   CALCIUM 9.3 8.5*   PROT 7.0 6.0   ALBUMIN 3.3* 2.9*   BILITOT 0.7 0.6   ALKPHOS 108 90   AST 20 18   ALT 19 16   ANIONGAP 13 9     Cardiac Markers:   Recent Labs   Lab 01/19/23  2341   BNP 2,511*     Troponin:   Recent Labs   Lab 01/19/23  2044 01/19/23  2341 01/20/23  0607   TROPONINI 0.745* 0.758* 1.019*       Significant Imaging: I have reviewed all pertinent imaging results/findings within the past 24 hours.  CXR: I have reviewed all pertinent results/findings within the past 24 hours and my personal findings are:  pleura effusions  Echo: I have reviewed all pertinent results/findings within the past 24 hours and my personal findings are:  ejection fraction 25%  V/q scan low probability for pulmonary embolism       Assessment/Plan:      GERD (gastroesophageal reflux disease)  .Chronic. Stable. Currently asymptomatic. Home medications include PPI/Antacids as needed.  Plan:  -Continue PPI/Antacids as needed       Depression  Chronic. Stable. Not in acute exacerbation and currently denies endorsing any suicidal/homicidal ideations.   Plan:  -Continue home medications       HLD (hyperlipidemia)  Patient is chronically on statin.will continue for now. Last Lipid Panel: No results found for: CHOL, HDL, LDLCALC, TRIG, CHOLHDL  Plan:  -Continue home medication  -f/u lipid panel         NSTEMI (non-ST elevated myocardial infarction)  Ischemic evaluation per  cardiology when more compensated       Elevated troponin  Patient is doing found to have elevated troponin measuring 0.745 with repeat 0.758.  EKG shows sinus tachycardia with T-wave inversions in inferior and anterior lateral leads. Patient without evidence of chest pain does show cardiomyopathy with presence of new onset heart failure.  May be secondary to demand ischemia but treating as NSTEMI in initiated on heparin.  Cardiology consulted by ED staff and aware of patient and awaiting further evaluation in the morning.  Recommended administering aspirin, statin, and beta-blocker which was provided with the exception of beta-blocker due to possible new onset heart failure and provided       New onset of congestive heart failure  Patient is identified as having heart failure that is new onset. CHF is currently uncontrolled due to Continued edema of extremities, >3 pillow orthopnea, Rales/crackles on pulmonary exam and Pulmonary edema/pleural effusion on CXR. Latest ECHO performed and demonstrates- No results found for this or any previous visit.  . Continue Beta Blocker and Furosemide and monitor clinical status closely. Monitor on telemetry. Patient is on CHF pathway.  Monitor strict Is&Os and daily weights.  Place on fluid restriction of 1.5 L. Continue to stress to patient importance of self efficacy and  on diet for CHF. Last BNP reviewed- and noted below   Recent Labs   Lab 01/19/23  2341   BNP 2,511*     Intravenous diuresising  Cardiology consulted  Critical care consult for higher level of care  abg pending  If hypercapnic, transfer to icu for nippv      Shortness of breath  Patient presented with worsening dyspnea/shortness of breath as noted above likely secondary to new onset CHF.  Patient currently saturating % on room air.  Respiratory rate ranging from 17-26.  She remains afebrile without leukocytosis with a/HI baseline currently measuring 10.8/34.6.  BNP elevated at 1940 with chest x-ray  revealing mild cardiomegaly with mild perihilar interstitial opacity and this likely trace pleural effusions or pleural thickening.  D-dimer not obtained in the ED.  History of PE in the past but not severely hypoxic or tachycardic with tachypnea improving following diuresing.  Patient provided 20 mg IV Lasix x1.  Cardiology consulted and following patient.  Plan:  See congestive heart failure plan      History of pulmonary embolus (PE)  On heparin gtt       History of CVA (cerebrovascular accident)  Patient with history remote CVA and PE proximally 4 years prior to admission previously treated with Eliquis.  Patient does present with some concerns of recurrent PE but has likely cause of shortness a breath, tachypnea, and hypoxia given CHF exacerbation.  We will obtain D-dimer as it was not obtained in the ED and consider CTA for definitive rule out of PE given history and presentation.  Currently on heparin drip for treatment of NSTEMI with cardiology following and awaiting further evaluation/recommendations.  Plan:  Does not wear supplemental oxygen at baseline  -reat CHF exacerbation and NSTEMI as noted above  elevated d-dimer  vq scan low prob pulmonary embolism       Primary hypertension  Controlled  No antihtn medication(s) on home medication(s) list  Losartan started   Intravenous diuresis  IV hydralazine prn for SBP>160 or DBP>90           VTE Risk Mitigation (From admission, onward)         Ordered     Reason for No Pharmacological VTE Prophylaxis  Once        Question:  Reasons:  Answer:  Physician Provided (leave comment)  Comment:  on heparin drip    01/19/23 2246     IP VTE HIGH RISK PATIENT  Once         01/19/23 2246     Place sequential compression device  Until discontinued         01/19/23 2246     heparin 25,000 units in dextrose 5% (100 units/ml) IV bolus from bag - ADDITIONAL PRN BOLUS - 60 units/kg (max bolus 4000 units)  As needed (PRN)        Question:  Heparin Infusion Adjustment (DO NOT  MODIFY ANSWER)  Answer:  \\[email or web address]\epic\Images\Pharmacy\HeparinInfusions\heparin LOW INTENSITY nomogram for [facility] [id number]    01/19/23 2126     heparin 25,000 units in dextrose 5% (100 units/ml) IV bolus from bag - ADDITIONAL PRN BOLUS - 30 units/kg (max bolus 4000 units)  As needed (PRN)        Question:  Heparin Infusion Adjustment (DO NOT MODIFY ANSWER)  Answer:  \\[email or web address]\epic\Images\Pharmacy\HeparinInfusions\heparin LOW INTENSITY nomogram for [facility] [id number].pdf    01/19/23 2126     heparin 25,000 units in dextrose 5% 250 mL (100 units/mL) infusion LOW INTENSITY nomogram - [facility]  Continuous        Question Answer Comment   Heparin Infusion Adjustment (DO NOT MODIFY ANSWER) \\[email or web address]\epic\Images\Pharmacy\HeparinInfusions\heparin LOW INTENSITY nomogram for [facility] [id number]    Begin at (in units/kg/hr) 12        01/19/23 2126                Discharge Planning   SHIVANI:      Code Status: Full Code   Is the patient medically ready for discharge?:     Reason for patient still in hospital (select all that apply): Patient trending condition, Laboratory test, Treatment, Imaging, Consult recommendations and Pending disposition  Discharge Plan A: Home with family                  Calvin Schrader MD  Department of Hospital Medicine   'On license of [facility]

## 2023-01-20 NOTE — ASSESSMENT & PLAN NOTE
Patient is doing found to have elevated troponin measuring 0.745 with repeat 0.758.  EKG shows sinus tachycardia with T-wave inversions in inferior and anterior lateral leads. Patient without evidence of chest pain does show cardiomyopathy with presence of new onset heart failure.  May be secondary to demand ischemia but treating as NSTEMI in initiated on heparin.  Cardiology consulted by ED staff and aware of patient and awaiting further evaluation in the morning.  Recommended administering aspirin, statin, and beta-blocker which was provided with the exception of beta-blocker due to possible new onset heart failure and provided

## 2023-01-20 NOTE — ASSESSMENT & PLAN NOTE
.Chronic. Stable. Currently asymptomatic. Home medications include PPI/Antacids as needed.  Plan:  -Continue PPI/Antacids as needed

## 2023-01-20 NOTE — ASSESSMENT & PLAN NOTE
Patient with history remote CVA and PE proximally 4 years prior to admission previously treated with Eliquis.  Patient does present with some concerns of recurrent PE but has likely cause of shortness a breath, tachypnea, and hypoxia given CHF exacerbation.  We will obtain D-dimer as it was not obtained in the ED and consider CTA for definitive rule out of PE given history and presentation.  Currently on heparin drip for treatment of NSTEMI with cardiology following and awaiting further evaluation/recommendations.  Plan:  Does not wear supplemental oxygen at baseline  -reat CHF exacerbation and NSTEMI as noted above  elevated d-dimer  vq scan low prob pulmonary embolism

## 2023-01-20 NOTE — ASSESSMENT & PLAN NOTE
- likely 2/2 to volume overload  - continue diuresis  - I suspect her wheezing is 2/2 to pulmonary edema, but she does take Trelegy at home, so I'm going to go ahead and change her nebs to scheduled  - continue LABA/ICS nebs  - ABG without hypercapnea currently, but low threshold to recheck and apply Bipap if she declines further  - supplemental oxygen for goal SpO2 > 88%

## 2023-01-20 NOTE — HPI
Violeta Scherer is a 72 y.o. female with a PMH  has a past medical history of Hypertension, Other pulmonary embolism without acute cor pulmonale, and Stroke. who presented to the ED for further evaluation of progressive worsening shortness of breath x2 days duration.  Patient reportedly had acute onset and progressively worsening shortness of breath over the past 3 days with patient now reporting 3-4 pillow orthopnea, PND, dyspnea on minimal exertion, and bilateral lower extremity pitting edema.  Patient denies history of heart failure, COPD, of prior MIs but did have history of previous stroke/TIA and PE's.  Aggravating factors included those noted above with no known alleviating factors.  All other review of systems negative including for lightheadedness, dizziness, headache, visual changes, fever, chills, sweats, nausea, vomiting, chest pain, abdominal pain, dysuria, hematuria, melena, hematochezia, constipation, diarrhea, or onset neurological deficits.  Prior to onset of symptoms, patient reported being in her usual state health no other concerns or complaints.  Initial workup in the ED revealed patient to have evidence consistent with signs/symptoms of new onset CHF and NSTEMI.  Hospital Medicine consulted by ED staff for admission for continued medical management.     Patient seen and examined in room with daughter present. Increased edema over 3 weeks. Mild epigastric discomfort now resolved. Evidence of NSTEMI and chf with reduced LVEF 25% by exam. Unable to lie flat and will defer cath until improved by Monday or weekend if needed.

## 2023-01-20 NOTE — H&P
[facility] Medicine  History & Physical    Patient Name: Violeta Scherer  MRN: [id number]  Patient Class: IP- Inpatient  Admission Date: 1/19/2023  Attending Physician: Calvin Schrader MD   Primary Care Provider: Karrie Birmingham MD         Patient information was obtained from patient, relative(s), past medical records and ER records.     Subjective:     Principal Problem:<principal problem not specified>    Chief Complaint:   Chief Complaint   Patient presents with    Shortness of Breath     C/o SOB x 3 days. Weakness. On inhalers at home with no relief. Bilateral leg swelling.         HPI: Violeta Scherer is a 72 y.o. female with a PMH  has a past medical history of Hypertension, Other pulmonary embolism without acute cor pulmonale, and Stroke. who presented to the ED for further evaluation of progressive worsening shortness of breath x2 days duration.  Patient reportedly had acute onset and progressively worsening shortness of breath over the past 3 days with patient now reporting 3-4 pillow orthopnea, PND, dyspnea on minimal exertion, and bilateral lower extremity pitting edema.  Patient denies history of heart failure, COPD, of prior MIs but did have history of previous stroke/TIA and PE's.  Aggravating factors included those noted above with no known alleviating factors.  All other review of systems negative including for lightheadedness, dizziness, headache, visual changes, fever, chills, sweats, nausea, vomiting, chest pain, abdominal pain, dysuria, hematuria, melena, hematochezia, constipation, diarrhea, or onset neurological deficits.  Prior to onset of symptoms, patient reported being in her usual state health no other concerns or complaints.  Initial workup in the ED revealed patient to have evidence consistent with signs/symptoms of new onset CHF and NSTEMI.  Hospital Medicine consulted by ED staff for admission for continued medical management.      PCP: Karrie Birmingham        Past  Medical History:   Diagnosis Date    Hypertension     Other pulmonary embolism without acute cor pulmonale     approx greater than 4 years    Stroke     TIA       Past Surgical History:   Procedure Laterality Date    TONSILLECTOMY         Review of patient's allergies indicates:   Allergen Reactions    Penicillins Hives    Bactrim [sulfamethoxazole-trimethoprim] Hives       No current facility-administered medications on file prior to encounter.     Current Outpatient Medications on File Prior to Encounter   Medication Sig    apixaban (ELIQUIS) 2.5 mg Tab Take 2.5 mg by mouth.    atorvastatin (LIPITOR) 20 MG tablet Take 20 mg by mouth once daily.    docusate sodium (COLACE) 100 MG capsule Take 1 capsule (100 mg total) by mouth 2 (two) times daily as needed for Constipation.    EScitalopram oxalate (LEXAPRO) 10 MG tablet Take 10 mg by mouth once daily.    fluticasone-umeclidin-vilanter (TRELEGY ELLIPTA) 100-62.5-25 mcg DsDv Inhale 1 puff into the lungs.    pantoprazole (PROTONIX) 40 MG tablet Take 40 mg by mouth 2 (two) times a day.    albuterol (PROVENTIL/VENTOLIN HFA) 90 mcg/actuation inhaler Inhale into the lungs daily as needed.    HYDROcodone-acetaminophen (NORCO) 7.5-325 mg per tablet Take 1 tablet by mouth every 6 (six) hours as needed for Pain.    ondansetron (ZOFRAN) 4 MG tablet Take 1 tablet (4 mg total) by mouth every 6 (six) hours.     Family History    None       Tobacco Use    Smoking status: Former     Packs/day: 3.00     Years: 35.00     Pack years: 105.00     Types: Cigarettes     Quit date:      Years since quittin.0    Smokeless tobacco: Not on file   Substance and Sexual Activity    Alcohol use: Not on file    Drug use: No    Sexual activity: Not on file     Review of Systems   All other systems reviewed and are negative.  Objective:     Vital Signs (Most Recent):  Temp: 98 °F (36.7 °C) (230)  Pulse: 92 (23)  Resp: 20 (23)  BP: 130/68  (01/19/23 2320)  SpO2: 95 % (01/20/23 0200) Vital Signs (24h Range):  Temp:  [97.7 °F (36.5 °C)-98 °F (36.7 °C)] 98 °F (36.7 °C)  Pulse:  [] 92  Resp:  [17-26] 20  SpO2:  [91 %-100 %] 95 %  BP: (122-131)/(67-72) 130/68     Weight: 70.6 kg (155 lb 10.3 oz)  Body mass index is 25.12 kg/m².    Physical Exam  Vitals reviewed.   Constitutional:       General: She is in acute distress.      Appearance: Normal appearance. She is normal weight. She is ill-appearing. She is not toxic-appearing or diaphoretic.   HENT:      Head: Normocephalic and atraumatic.      Right Ear: External ear normal.      Left Ear: External ear normal.      Nose: Nose normal. No congestion or rhinorrhea.      Mouth/Throat:      Mouth: Mucous membranes are moist.      Pharynx: Oropharynx is clear. No oropharyngeal exudate or posterior oropharyngeal erythema.   Eyes:      General: No scleral icterus.     Extraocular Movements: Extraocular movements intact.      Conjunctiva/sclera: Conjunctivae normal.      Pupils: Pupils are equal, round, and reactive to light.   Neck:      Vascular: No carotid bruit.   Cardiovascular:      Rate and Rhythm: Normal rate and regular rhythm.      Pulses: Normal pulses.      Heart sounds: Normal heart sounds. No murmur heard.    No friction rub. No gallop.   Pulmonary:      Effort: Respiratory distress present.      Breath sounds: No stridor. Wheezing and rales present. No rhonchi.      Comments: Patient with increased workup rhythm without signs of impending respiratory failure or use of accessory muscles noted.  Patient has bilateral acute mary wheezes and bibasilar crackles lisa auscultation.  Chest:      Chest wall: No tenderness.   Abdominal:      General: Abdomen is flat. Bowel sounds are normal. There is no distension.      Palpations: Abdomen is soft.      Tenderness: There is no abdominal tenderness. There is no right CVA tenderness, left CVA tenderness, guarding or rebound.      Hernia: No hernia is  present.   Musculoskeletal:         General: Swelling present. No tenderness, deformity or signs of injury. Normal range of motion.      Cervical back: Normal range of motion and neck supple. No rigidity or tenderness.      Right lower leg: Edema present.      Left lower leg: Edema present.      Comments: Patient noted to have 3+ bilateral lower extremity pitting edema    Lymphadenopathy:      Cervical: No cervical adenopathy.   Skin:     General: Skin is warm and dry.      Capillary Refill: Capillary refill takes less than 2 seconds.      Coloration: Skin is not jaundiced or pale.      Findings: No bruising, erythema, lesion or rash.   Neurological:      General: No focal deficit present.      Mental Status: She is alert and oriented to person, place, and time. Mental status is at baseline.      Cranial Nerves: No cranial nerve deficit.      Sensory: No sensory deficit.      Motor: No weakness.      Coordination: Coordination normal.   Psychiatric:         Mood and Affect: Mood normal.         Behavior: Behavior normal.         Thought Content: Thought content normal.         Judgment: Judgment normal.         CRANIAL NERVES     CN III, IV, VI   Pupils are equal, round, and reactive to light.     Significant Labs: All pertinent labs within the past 24 hours have been reviewed.    Significant Imaging: I have reviewed all pertinent imaging results/findings within the past 24 hours.    LABS:  Recent Results (from the past 24 hour(s))   CBC auto differential    Collection Time: 01/19/23  8:44 PM   Result Value Ref Range    WBC 8.30 3.90 - 12.70 K/uL    RBC 4.19 4.00 - 5.40 M/uL    Hemoglobin 11.2 (L) 12.0 - 16.0 g/dL    Hematocrit 35.5 (L) 37.0 - 48.5 %    MCV 85 82 - 98 fL    MCH 26.7 (L) 27.0 - 31.0 pg    MCHC 31.5 (L) 32.0 - 36.0 g/dL    RDW 15.3 (H) 11.5 - 14.5 %    Platelets 391 150 - 450 K/uL    MPV 10.2 9.2 - 12.9 fL    Immature Granulocytes 0.2 0.0 - 0.5 %    Gran # (ANC) 5.7 1.8 - 7.7 K/uL    Immature Grans  (Abs) 0.02 0.00 - 0.04 K/uL    Lymph # 1.8 1.0 - 4.8 K/uL    Mono # 0.7 0.3 - 1.0 K/uL    Eos # 0.0 0.0 - 0.5 K/uL    Baso # 0.03 0.00 - 0.20 K/uL    nRBC 0 0 /100 WBC    Gran % 69.1 38.0 - 73.0 %    Lymph % 21.6 18.0 - 48.0 %    Mono % 8.6 4.0 - 15.0 %    Eosinophil % 0.1 0.0 - 8.0 %    Basophil % 0.4 0.0 - 1.9 %    Differential Method Automated    Comprehensive metabolic panel    Collection Time: 01/19/23  8:44 PM   Result Value Ref Range    Sodium 140 136 - 145 mmol/L    Potassium 3.4 (L) 3.5 - 5.1 mmol/L    Chloride 104 95 - 110 mmol/L    CO2 23 23 - 29 mmol/L    Glucose 123 (H) 70 - 110 mg/dL    BUN 21 8 - 23 mg/dL    Creatinine 1.0 0.5 - 1.4 mg/dL    Calcium 9.3 8.7 - 10.5 mg/dL    Total Protein 7.0 6.0 - 8.4 g/dL    Albumin 3.3 (L) 3.5 - 5.2 g/dL    Total Bilirubin 0.7 0.1 - 1.0 mg/dL    Alkaline Phosphatase 108 55 - 135 U/L    AST 20 10 - 40 U/L    ALT 19 10 - 44 U/L    Anion Gap 13 8 - 16 mmol/L    eGFR >60 >60 mL/min/1.73 m^2   Troponin I    Collection Time: 01/19/23  8:44 PM   Result Value Ref Range    Troponin I 0.745 (H) 0.000 - 0.026 ng/mL   Brain natriuretic peptide    Collection Time: 01/19/23  8:44 PM   Result Value Ref Range    BNP 1,940 (H) 0 - 99 pg/mL   APTT    Collection Time: 01/19/23  9:34 PM   Result Value Ref Range    aPTT 26.1 21.0 - 32.0 sec   Protime-INR    Collection Time: 01/19/23  9:34 PM   Result Value Ref Range    Prothrombin Time 11.6 9.0 - 12.5 sec    INR 1.1 0.8 - 1.2   BNP    Collection Time: 01/19/23 11:41 PM   Result Value Ref Range    BNP 2,511 (H) 0 - 99 pg/mL   CBC auto differential    Collection Time: 01/19/23 11:41 PM   Result Value Ref Range    WBC 8.51 3.90 - 12.70 K/uL    RBC 4.07 4.00 - 5.40 M/uL    Hemoglobin 10.8 (L) 12.0 - 16.0 g/dL    Hematocrit 34.6 (L) 37.0 - 48.5 %    MCV 85 82 - 98 fL    MCH 26.5 (L) 27.0 - 31.0 pg    MCHC 31.2 (L) 32.0 - 36.0 g/dL    RDW 15.3 (H) 11.5 - 14.5 %    Platelets 349 150 - 450 K/uL    MPV 10.2 9.2 - 12.9 fL    Immature Granulocytes  0.2 0.0 - 0.5 %    Gran # (ANC) 5.7 1.8 - 7.7 K/uL    Immature Grans (Abs) 0.02 0.00 - 0.04 K/uL    Lymph # 2.0 1.0 - 4.8 K/uL    Mono # 0.8 0.3 - 1.0 K/uL    Eos # 0.0 0.0 - 0.5 K/uL    Baso # 0.04 0.00 - 0.20 K/uL    nRBC 0 0 /100 WBC    Gran % 66.3 38.0 - 73.0 %    Lymph % 23.3 18.0 - 48.0 %    Mono % 9.3 4.0 - 15.0 %    Eosinophil % 0.4 0.0 - 8.0 %    Basophil % 0.5 0.0 - 1.9 %    Differential Method Automated    Troponin I #2    Collection Time: 01/19/23 11:41 PM   Result Value Ref Range    Troponin I 0.758 (H) 0.000 - 0.026 ng/mL       RADIOLOGY  X-Ray Chest AP Portable    Result Date: 1/19/2023  EXAMINATION: XR CHEST AP PORTABLE CLINICAL HISTORY: sob; TECHNIQUE: Single frontal view of the chest was performed. COMPARISON: None FINDINGS: Gaseous distension of the stomach.  Interstitial prominence.  Mild cardiomegaly with atherosclerotic changes.  Blunting of the costophrenic sulci. Bones are intact.     Mild cardiomegaly with mild perihilar interstitial opacity and this likely trace pleural effusions or pleural thickening.  Recommend correlation to element of CHF. Electronically signed by: Ganesh Leung Date:    01/19/2023 Time:    20:43      EKG    MICROBIOLOGY    [facility]      Assessment/Plan:     New onset of congestive heart failure  Patient is identified as having heart failure that is new onset. CHF is currently uncontrolled due to Continued edema of extremities, >3 pillow orthopnea, Rales/crackles on pulmonary exam and Pulmonary edema/pleural effusion on CXR. Latest ECHO performed and demonstrates- No results found for this or any previous visit.  . Continue Beta Blocker and Furosemide and monitor clinical status closely. Monitor on telemetry. Patient is on CHF pathway.  Monitor strict Is&Os and daily weights.  Place on fluid restriction of 1.5 L. Continue to stress to patient importance of self efficacy and  on diet for CHF. Last BNP reviewed- and noted below   Recent Labs   Lab 01/19/23  7621   BNP  2,511*   .      Shortness of breath  Patient presented with worsening dyspnea/shortness of breath as noted above likely secondary to new onset CHF.  Patient currently saturating % on room air.  Respiratory rate ranging from 17-26.  She remains afebrile without leukocytosis with a/HI baseline currently measuring 10.8/34.6.  BNP elevated at 1940 with chest x-ray revealing mild cardiomegaly with mild perihilar interstitial opacity and this likely trace pleural effusions or pleural thickening.  D-dimer not obtained in the ED.  History of PE in the past but not severely hypoxic or tachycardic with tachypnea improving following diuresing.  Patient provided 20 mg IV Lasix x1.  Cardiology consulted and following patient.  Plan:  -continue diuresing as needed  -monitor Is/Os  -daily weights  -maintain potassium of 4.0 and mag of 2.0  -f/u echo  -f/u cardiology  -will obtain D-dimer, consider CTA pending results to definitively exclude PE given history      NSTEMI (non-ST elevated myocardial infarction)        Elevated troponin  Patient is doing found to have elevated troponin measuring 0.745 with repeat 0.758.  EKG shows sinus tachycardia with T-wave inversions in inferior and anterior lateral leads. Patient without evidence of chest pain does show cardiomyopathy with presence of new onset heart failure.  May be secondary to demand ischemia but treating as NSTEMI in initiated on heparin.  Cardiology consulted by ED staff and aware of patient and awaiting further evaluation in the morning.  Recommended administering aspirin, statin, and beta-blocker which was provided with the exception of beta-blocker due to possible new onset heart failure and provided       Primary hypertension  Currently normotensive.  Plan:  -Optimize pain control   -Continue home medications, titrate as needed   -Monitor BP  -Low salt/cardiac diet when not NPO  -continue workup and treatment of CHF  -IV hydralazine prn for SBP>160 or DBP>90          History of pulmonary embolus (PE)        History of CVA (cerebrovascular accident)  Patient with history remote CVA and PE proximally 4 years prior to admission previously treated with Eliquis.  Patient does present with some concerns of recurrent PE but has likely cause of shortness a breath, tachypnea, and hypoxia given CHF exacerbation.  We will obtain D-dimer as it was not obtained in the ED and consider CTA for definitive rule out of PE given history and presentation.  Currently on heparin drip for treatment of NSTEMI with cardiology following and awaiting further evaluation/recommendations.  Plan:  -titrate oxygen therapy as needed  -treat CHF exacerbation and NSTEMI as noted above  -f/u d-dimer  -consider CTA pending results       HLD (hyperlipidemia)  Patient is chronically on statin.will continue for now. Last Lipid Panel: No results found for: CHOL, HDL, LDLCALC, TRIG, CHOLHDL  Plan:  -Continue home medication  -f/u lipid panel   -low fat/low calorie diet      Depression  Chronic. Stable. Not in acute exacerbation and currently denies endorsing any suicidal/homicidal ideations.   Plan:  -Continue home medications       GERD (gastroesophageal reflux disease)  .Chronic. Stable. Currently asymptomatic. Home medications include PPI/Antacids as needed.  Plan:  -Continue PPI/Antacids as needed       VTE Risk Mitigation (From admission, onward)         Ordered     Reason for No Pharmacological VTE Prophylaxis  Once        Question:  Reasons:  Answer:  Physician Provided (leave comment)  Comment:  on heparin drip    01/19/23 2246     IP VTE HIGH RISK PATIENT  Once         01/19/23 2246     Place sequential compression device  Until discontinued         01/19/23 2246     heparin 25,000 units in dextrose 5% (100 units/ml) IV bolus from bag - ADDITIONAL PRN BOLUS - 60 units/kg (max bolus 4000 units)  As needed (PRN)        Question:  Heparin Infusion Adjustment (DO NOT MODIFY ANSWER)  Answer:   [email or web address]\epic\Images\Pharmacy\HeparinInfusions\heparin LOW INTENSITY nomogram for [facility] [id number]    01/19/23 2126     heparin 25,000 units in dextrose 5% (100 units/ml) IV bolus from bag - ADDITIONAL PRN BOLUS - 30 units/kg (max bolus 4000 units)  As needed (PRN)        Question:  Heparin Infusion Adjustment (DO NOT MODIFY ANSWER)  Answer:  \\[email or web address]\epic\Images\Pharmacy\HeparinInfusions\heparin LOW INTENSITY nomogram for [facility] [id number]    01/19/23 2126     heparin 25,000 units in dextrose 5% 250 mL (100 units/mL) infusion LOW INTENSITY nomogram - [facility]  Continuous        Question Answer Comment   Heparin Infusion Adjustment (DO NOT MODIFY ANSWER) \\[email or web address]\epic\Images\Pharmacy\HeparinInfusions\heparin LOW INTENSITY nomogram for [facility] [id number]    Begin at (in units/kg/hr) 12        01/19/23 2126              //Core Measures   -DVT proph: SCDs, heparin drip   -Code status: Full    -Surrogate: daughter       Components of this note were documented using a voice recognition system and are subject to errors not corrected at the time the document was proof read. Please contact the author for any clarifications.       Alex Arnold MD  Department of Hospital Medicine   [facility] - Med Surg

## 2023-01-20 NOTE — ASSESSMENT & PLAN NOTE
Patient is chronically on statin.will continue for now. Last Lipid Panel: No results found for: CHOL, HDL, LDLCALC, TRIG, CHOLHDL  Plan:  -Continue home medication  -f/u lipid panel

## 2023-01-20 NOTE — ASSESSMENT & PLAN NOTE
Controlled  No antihtn medication(s) on home medication(s) list  Losartan started   Intravenous diuresis  IV hydralazine prn for SBP>160 or DBP>90

## 2023-01-20 NOTE — ASSESSMENT & PLAN NOTE
Patient is identified as having heart failure that is new onset. CHF is currently uncontrolled due to Continued edema of extremities, >3 pillow orthopnea, Rales/crackles on pulmonary exam and Pulmonary edema/pleural effusion on CXR. Latest ECHO performed and demonstrates- No results found for this or any previous visit.  . Continue Beta Blocker and Furosemide and monitor clinical status closely. Monitor on telemetry. Patient is on CHF pathway.  Monitor strict Is&Os and daily weights.  Place on fluid restriction of 1.5 L. Continue to stress to patient importance of self efficacy and  on diet for CHF. Last BNP reviewed- and noted below   Recent Labs   Lab 01/19/23  2341   BNP 2,511*   .

## 2023-01-20 NOTE — SUBJECTIVE & OBJECTIVE
Past Medical History:   Diagnosis Date    Hypertension     Other pulmonary embolism without acute cor pulmonale     approx greater than 4 years    Stroke     TIA       Past Surgical History:   Procedure Laterality Date    TONSILLECTOMY         Review of patient's allergies indicates:   Allergen Reactions    Penicillins Hives    Bactrim [sulfamethoxazole-trimethoprim] Hives       Family History    None       Tobacco Use    Smoking status: Former     Packs/day: 3.00     Years: 35.00     Pack years: 105.00     Types: Cigarettes     Quit date: 2000     Years since quittin.0    Smokeless tobacco: Not on file   Substance and Sexual Activity    Alcohol use: Not on file    Drug use: No    Sexual activity: Not on file         Review of Systems   All other systems reviewed and are negative.  Objective:     Vital Signs (Most Recent):  Temp: 97.5 °F (36.4 °C) (23 1531)  Pulse: 94 (23 153)  Resp: 18 (23 153)  BP: 129/60 (23 153)  SpO2: 97 % (23 153) Vital Signs (24h Range):  Temp:  [97.3 °F (36.3 °C)-98.6 °F (37 °C)] 97.5 °F (36.4 °C)  Pulse:  [] 94  Resp:  [17-26] 18  SpO2:  [91 %-100 %] 97 %  BP: (112-140)/(60-81) 129/60     Weight: 70.3 kg (155 lb)  Body mass index is 25.02 kg/m².    No intake or output data in the 24 hours ending 23 1607    Physical Exam  Vitals and nursing note reviewed.   Constitutional:       General: She is not in acute distress.     Comments: Drowsy, but easily arousable to voice   Eyes:      General: No scleral icterus.     Extraocular Movements: Extraocular movements intact.      Conjunctiva/sclera: Conjunctivae normal.      Pupils: Pupils are equal, round, and reactive to light.   Cardiovascular:      Rate and Rhythm: Normal rate and regular rhythm.      Pulses: Normal pulses.      Heart sounds: No murmur heard.  Pulmonary:      Breath sounds: Wheezing and rales present. No rhonchi.      Comments: Mildly increased WOB, but no distress  Abdominal:       General: Abdomen is flat. There is no distension.      Palpations: Abdomen is soft.      Tenderness: There is no abdominal tenderness.   Musculoskeletal:      Cervical back: Normal range of motion and neck supple. No rigidity or tenderness.      Right lower leg: Edema present.      Left lower leg: Edema present.   Neurological:      Comments: Drowsy, but easily arousable.  Follows commands and answers questions appropriately.       Vents:  Oxygen Concentration (%): 32 (01/20/23 1338)    Lines/Drains/Airways       Peripheral Intravenous Line  Duration                  Peripheral IV - Single Lumen 01/19/23 2038 18 G Right Antecubital <1 day         Peripheral IV - Single Lumen 01/19/23 2044 18 G Left Antecubital <1 day                    Significant Labs:    CBC/Anemia Profile:  Recent Labs   Lab 01/19/23  2341 01/20/23  0406 01/20/23  0540   WBC 8.51 7.59 7.41   HGB 10.8* 10.0* 10.2*   HCT 34.6* 31.7* 31.8*    277 275   MCV 85 83 84   RDW 15.3* 15.3* 15.3*        Chemistries:  Recent Labs   Lab 01/19/23  2044 01/20/23  0406 01/20/23  0607    141  --    K 3.4* 3.4*  --     106  --    CO2 23 26  --    BUN 21 18  --    CREATININE 1.0 0.8  --    CALCIUM 9.3 8.5*  --    ALBUMIN 3.3* 2.9*  --    PROT 7.0 6.0  --    BILITOT 0.7 0.6  --    ALKPHOS 108 90  --    ALT 19 16  --    AST 20 18  --    MG  --   --  2.1       All pertinent labs within the past 24 hours have been reviewed.    Significant Imaging:   I have reviewed all pertinent imaging results/findings within the past 24 hours.

## 2023-01-20 NOTE — ED PROVIDER NOTES
SCRIBE #1 NOTE: I, Catalino Consuelo, am scribing for, and in the presence of, Tonie Molina MD. I have scribed the entire note.       History     Chief Complaint   Patient presents with    Shortness of Breath     C/o SOB x 3 days. Weakness. On inhalers at home with no relief. Bilateral leg swelling.      Review of patient's allergies indicates:   Allergen Reactions    Penicillins Hives    Bactrim [sulfamethoxazole-trimethoprim] Hives         History of Present Illness     HPI    1/19/2023, 8:43 PM  History obtained from the daughter and patient      History of Present Illness: Violeta Scherer is a 72 y.o. female patient with a PMHx of HTN and stroke who presents to the Emergency Department for evaluation of SOB. Pt is not on supplemental O2 at home. Pt's daughter states that she noticed the pt becoming more short of breath and having to work harder to breath Symptoms are constant and moderate in severity. No mitigating or exacerbating factors reported. Associated sxs include leg swelling. Patient denies any CP, HA, n/v, numbness, weakness, and all other sxs at this time. No prior tx reported. No further complaints or concerns at this time.       Arrival mode: Personal vehicle    PCP: Karrie Birmingham MD        Past Medical History:  Past Medical History:   Diagnosis Date    Hypertension     Other pulmonary embolism without acute cor pulmonale     approx greater than 4 years    Stroke     TIA       Past Surgical History:  Past Surgical History:   Procedure Laterality Date    LEFT HEART CATHETERIZATION Left 1/22/2023    Procedure: CATHETERIZATION, HEART, LEFT;  Surgeon: Elias Brown MD;  Location: [facility] CATH LAB;  Service: Cardiology;  Laterality: Left;    TONSILLECTOMY           Family History:  History reviewed. No pertinent family history.    Social History:  Social History     Tobacco Use    Smoking status: Former     Packs/day: 3.00     Years: 35.00     Pack years: 105.00     Types: Cigarettes     Quit  date:      Years since quittin.0    Smokeless tobacco: Not on file   Substance and Sexual Activity    Alcohol use: Not on file    Drug use: No    Sexual activity: Not on file        Review of Systems     Review of Systems   Constitutional:  Negative for chills and fever.   HENT:  Negative for sore throat.    Respiratory:  Positive for shortness of breath.    Cardiovascular:  Positive for leg swelling. Negative for chest pain.   Gastrointestinal:  Negative for nausea.   Genitourinary:  Negative for dysuria.   Musculoskeletal:  Negative for back pain.   Skin:  Negative for rash.   Neurological:  Negative for weakness.   Hematological:  Does not bruise/bleed easily.   All other systems reviewed and are negative.   Physical Exam     Initial Vitals [23]   BP Pulse Resp Temp SpO2   129/67 106 (!) 22 97.7 °F (36.5 °C) 95 %      MAP       --          Physical Exam  Nursing Notes and Vital Signs Reviewed.  Constitutional: Patient is in no acute distress. Well-developed and well-nourished.  Head: Atraumatic. Normocephalic.  Eyes: PERRL. EOM intact. Conjunctivae are not pale. No scleral icterus.  ENT: Mucous membranes are moist. Oropharynx is clear and symmetric.    Neck: Supple. Full ROM. No lymphadenopathy.  Cardiovascular: Regular rate. Regular rhythm. No murmurs, rubs, or gallops. Distal pulses are 2+ and symmetric.  Pulmonary/Chest: Diminished breath sounds with crackles and rales. Expiratory wheezing.  Abdominal: Soft and non-distended.  There is no tenderness.  No rebound, guarding, or rigidity. Good bowel sounds.  Genitourinary: No CVA tenderness  Musculoskeletal: Moves all extremities. No obvious deformities. No edema. No calf tenderness.  Skin: Warm and dry.  Neurological:  Alert, awake, and appropriate.  Normal speech.  No acute focal neurological deficits are appreciated.  Psychiatric: Normal affect. Good eye contact. Appropriate in content.     ED Course   Critical Care    Date/Time: 2023  9:38 PM  Performed by: Tonie Molina MD  Authorized by: Tonie Molina MD   Direct patient critical care time: 15 minutes  Additional history critical care time: 5 minutes  Ordering / reviewing critical care time: 10 minutes  Documentation critical care time: 5 minutes  Consulting other physicians critical care time: 5 minutes  Consult with family critical care time: 5 minutes  Total critical care time (exclusive of procedural time) : 45 minutes  Critical care time was exclusive of teaching time and separately billable procedures and treating other patients.  Critical care was necessary to treat or prevent imminent or life-threatening deterioration of the following conditions: cardiac failure.  Critical care was time spent personally by me on the following activities: blood draw for specimens, development of treatment plan with patient or surrogate, discussions with consultants, interpretation of cardiac output measurements, evaluation of patient's response to treatment, examination of patient, obtaining history from patient or surrogate, ordering and performing treatments and interventions, ordering and review of laboratory studies, ordering and review of radiographic studies, pulse oximetry, re-evaluation of patient's condition and review of old charts.      ED Vital Signs:  Vitals:    01/22/23 1945 01/22/23 2115 01/22/23 2157 01/22/23 2313   BP: 115/69      Pulse: 97 97 94 97   Resp: 19  20    Temp: 97.6 °F (36.4 °C)      TempSrc: Oral      SpO2: 97%  97%    Weight:       Height:        01/22/23 2356 01/23/23 0111 01/23/23 0125 01/23/23 0356   BP: 107/61      Pulse: 95 93 92 90   Resp: 14 18     Temp: 97.7 °F (36.5 °C)      TempSrc: Oral      SpO2: 98% 99%     Weight:       Height:        01/23/23 0403 01/23/23 0520 01/23/23 0749 01/23/23 0824   BP: 97/63  (!) 109/53    Pulse: 95  93 97   Resp: 16  17 20   Temp: 98.3 °F (36.8 °C)  98.6 °F (37 °C)    TempSrc: Oral  Oral    SpO2: 98%  96% 98%   Weight:   69.8 kg (153 lb 14.1 oz)     Height:        01/23/23 1152 01/23/23 1206 01/23/23 1548   BP: (!) 110/56  (!) 102/50   Pulse: 96 97 90   Resp: 20 16 19   Temp: 97.6 °F (36.4 °C)  98.2 °F (36.8 °C)   TempSrc: Oral  Oral   SpO2: (!) 92% (!) 92% (!) 91%   Weight:      Height:          Abnormal Lab Results:  Labs Reviewed   CBC W/ AUTO DIFFERENTIAL - Abnormal; Notable for the following components:       Result Value    Hemoglobin 11.2 (*)     Hematocrit 35.5 (*)     MCH 26.7 (*)     MCHC 31.5 (*)     RDW 15.3 (*)     All other components within normal limits   COMPREHENSIVE METABOLIC PANEL - Abnormal; Notable for the following components:    Potassium 3.4 (*)     Glucose 123 (*)     Albumin 3.3 (*)     All other components within normal limits   TROPONIN I - Abnormal; Notable for the following components:    Troponin I 0.745 (*)     All other components within normal limits   B-TYPE NATRIURETIC PEPTIDE - Abnormal; Notable for the following components:    BNP 1,940 (*)     All other components within normal limits   APTT    Narrative:     Draw baseline aPTT prior to starting the heparin bolus or  infusion  (if patient is on warfarin prior to heparin therapy)   PROTIME-INR    Narrative:     Draw baseline aPTT prior to starting the heparin bolus or  infusion  (if patient is on warfarin prior to heparin therapy)        All Lab Results:  Results for orders placed or performed during the hospital encounter of 01/19/23   CBC auto differential   Result Value Ref Range    WBC 8.30 3.90 - 12.70 K/uL    RBC 4.19 4.00 - 5.40 M/uL    Hemoglobin 11.2 (L) 12.0 - 16.0 g/dL    Hematocrit 35.5 (L) 37.0 - 48.5 %    MCV 85 82 - 98 fL    MCH 26.7 (L) 27.0 - 31.0 pg    MCHC 31.5 (L) 32.0 - 36.0 g/dL    RDW 15.3 (H) 11.5 - 14.5 %    Platelets 391 150 - 450 K/uL    MPV 10.2 9.2 - 12.9 fL    Immature Granulocytes 0.2 0.0 - 0.5 %    Gran # (ANC) 5.7 1.8 - 7.7 K/uL    Immature Grans (Abs) 0.02 0.00 - 0.04 K/uL    Lymph # 1.8 1.0 - 4.8 K/uL    Mono  # 0.7 0.3 - 1.0 K/uL    Eos # 0.0 0.0 - 0.5 K/uL    Baso # 0.03 0.00 - 0.20 K/uL    nRBC 0 0 /100 WBC    Gran % 69.1 38.0 - 73.0 %    Lymph % 21.6 18.0 - 48.0 %    Mono % 8.6 4.0 - 15.0 %    Eosinophil % 0.1 0.0 - 8.0 %    Basophil % 0.4 0.0 - 1.9 %    Differential Method Automated    Comprehensive metabolic panel   Result Value Ref Range    Sodium 140 136 - 145 mmol/L    Potassium 3.4 (L) 3.5 - 5.1 mmol/L    Chloride 104 95 - 110 mmol/L    CO2 23 23 - 29 mmol/L    Glucose 123 (H) 70 - 110 mg/dL    BUN 21 8 - 23 mg/dL    Creatinine 1.0 0.5 - 1.4 mg/dL    Calcium 9.3 8.7 - 10.5 mg/dL    Total Protein 7.0 6.0 - 8.4 g/dL    Albumin 3.3 (L) 3.5 - 5.2 g/dL    Total Bilirubin 0.7 0.1 - 1.0 mg/dL    Alkaline Phosphatase 108 55 - 135 U/L    AST 20 10 - 40 U/L    ALT 19 10 - 44 U/L    Anion Gap 13 8 - 16 mmol/L    eGFR >60 >60 mL/min/1.73 m^2   Troponin I   Result Value Ref Range    Troponin I 0.745 (H) 0.000 - 0.026 ng/mL   Brain natriuretic peptide   Result Value Ref Range    BNP 1,940 (H) 0 - 99 pg/mL   BNP   Result Value Ref Range    BNP 2,511 (H) 0 - 99 pg/mL   APTT   Result Value Ref Range    aPTT 26.1 21.0 - 32.0 sec   Protime-INR   Result Value Ref Range    Prothrombin Time 11.6 9.0 - 12.5 sec    INR 1.1 0.8 - 1.2   CBC auto differential   Result Value Ref Range    WBC 8.51 3.90 - 12.70 K/uL    RBC 4.07 4.00 - 5.40 M/uL    Hemoglobin 10.8 (L) 12.0 - 16.0 g/dL    Hematocrit 34.6 (L) 37.0 - 48.5 %    MCV 85 82 - 98 fL    MCH 26.5 (L) 27.0 - 31.0 pg    MCHC 31.2 (L) 32.0 - 36.0 g/dL    RDW 15.3 (H) 11.5 - 14.5 %    Platelets 349 150 - 450 K/uL    MPV 10.2 9.2 - 12.9 fL    Immature Granulocytes 0.2 0.0 - 0.5 %    Gran # (ANC) 5.7 1.8 - 7.7 K/uL    Immature Grans (Abs) 0.02 0.00 - 0.04 K/uL    Lymph # 2.0 1.0 - 4.8 K/uL    Mono # 0.8 0.3 - 1.0 K/uL    Eos # 0.0 0.0 - 0.5 K/uL    Baso # 0.04 0.00 - 0.20 K/uL    nRBC 0 0 /100 WBC    Gran % 66.3 38.0 - 73.0 %    Lymph % 23.3 18.0 - 48.0 %    Mono % 9.3 4.0 - 15.0 %     Eosinophil % 0.4 0.0 - 8.0 %    Basophil % 0.5 0.0 - 1.9 %    Differential Method Automated    Troponin I #2   Result Value Ref Range    Troponin I 0.758 (H) 0.000 - 0.026 ng/mL   CBC auto differential   Result Value Ref Range    WBC 7.59 3.90 - 12.70 K/uL    RBC 3.80 (L) 4.00 - 5.40 M/uL    Hemoglobin 10.0 (L) 12.0 - 16.0 g/dL    Hematocrit 31.7 (L) 37.0 - 48.5 %    MCV 83 82 - 98 fL    MCH 26.3 (L) 27.0 - 31.0 pg    MCHC 31.5 (L) 32.0 - 36.0 g/dL    RDW 15.3 (H) 11.5 - 14.5 %    Platelets 277 150 - 450 K/uL    MPV 9.8 9.2 - 12.9 fL    Immature Granulocytes 0.3 0.0 - 0.5 %    Gran # (ANC) 4.8 1.8 - 7.7 K/uL    Immature Grans (Abs) 0.02 0.00 - 0.04 K/uL    Lymph # 2.0 1.0 - 4.8 K/uL    Mono # 0.7 0.3 - 1.0 K/uL    Eos # 0.0 0.0 - 0.5 K/uL    Baso # 0.04 0.00 - 0.20 K/uL    nRBC 0 0 /100 WBC    Gran % 63.0 38.0 - 73.0 %    Lymph % 26.4 18.0 - 48.0 %    Mono % 9.4 4.0 - 15.0 %    Eosinophil % 0.4 0.0 - 8.0 %    Basophil % 0.5 0.0 - 1.9 %    Differential Method Automated    Comprehensive Metabolic Panel (CMP)   Result Value Ref Range    Sodium 141 136 - 145 mmol/L    Potassium 3.4 (L) 3.5 - 5.1 mmol/L    Chloride 106 95 - 110 mmol/L    CO2 26 23 - 29 mmol/L    Glucose 107 70 - 110 mg/dL    BUN 18 8 - 23 mg/dL    Creatinine 0.8 0.5 - 1.4 mg/dL    Calcium 8.5 (L) 8.7 - 10.5 mg/dL    Total Protein 6.0 6.0 - 8.4 g/dL    Albumin 2.9 (L) 3.5 - 5.2 g/dL    Total Bilirubin 0.6 0.1 - 1.0 mg/dL    Alkaline Phosphatase 90 55 - 135 U/L    AST 18 10 - 40 U/L    ALT 16 10 - 44 U/L    Anion Gap 9 8 - 16 mmol/L    eGFR >60 >60 mL/min/1.73 m^2   CBC with Automated Differential   Result Value Ref Range    WBC 7.41 3.90 - 12.70 K/uL    RBC 3.80 (L) 4.00 - 5.40 M/uL    Hemoglobin 10.2 (L) 12.0 - 16.0 g/dL    Hematocrit 31.8 (L) 37.0 - 48.5 %    MCV 84 82 - 98 fL    MCH 26.8 (L) 27.0 - 31.0 pg    MCHC 32.1 32.0 - 36.0 g/dL    RDW 15.3 (H) 11.5 - 14.5 %    Platelets 275 150 - 450 K/uL    MPV 9.8 9.2 - 12.9 fL    Immature Granulocytes 0.4  0.0 - 0.5 %    Gran # (ANC) 4.3 1.8 - 7.7 K/uL    Immature Grans (Abs) 0.03 0.00 - 0.04 K/uL    Lymph # 2.3 1.0 - 4.8 K/uL    Mono # 0.7 0.3 - 1.0 K/uL    Eos # 0.1 0.0 - 0.5 K/uL    Baso # 0.04 0.00 - 0.20 K/uL    nRBC 0 0 /100 WBC    Gran % 58.4 38.0 - 73.0 %    Lymph % 30.6 18.0 - 48.0 %    Mono % 9.4 4.0 - 15.0 %    Eosinophil % 0.7 0.0 - 8.0 %    Basophil % 0.5 0.0 - 1.9 %    Differential Method Automated    APTT   Result Value Ref Range    aPTT 61.6 (H) 21.0 - 32.0 sec   Troponin I   Result Value Ref Range    Troponin I 1.019 (H) 0.000 - 0.026 ng/mL   Lipid panel   Result Value Ref Range    Cholesterol 132 120 - 199 mg/dL    Triglycerides 56 30 - 150 mg/dL    HDL 45 40 - 75 mg/dL    LDL Cholesterol 75.8 63.0 - 159.0 mg/dL    HDL/Cholesterol Ratio 34.1 20.0 - 50.0 %    Total Cholesterol/HDL Ratio 2.9 2.0 - 5.0    Non-HDL Cholesterol 87 mg/dL   D dimer, quantitative   Result Value Ref Range    D-Dimer 0.80 (H) <0.50 mg/L FEU   APTT   Result Value Ref Range    aPTT 46.9 (H) 21.0 - 32.0 sec   Magnesium   Result Value Ref Range    Magnesium 2.1 1.6 - 2.6 mg/dL   Comprehensive Metabolic Panel (CMP)   Result Value Ref Range    Sodium 140 136 - 145 mmol/L    Potassium 3.5 3.5 - 5.1 mmol/L    Chloride 104 95 - 110 mmol/L    CO2 26 23 - 29 mmol/L    Glucose 97 70 - 110 mg/dL    BUN 20 8 - 23 mg/dL    Creatinine 0.9 0.5 - 1.4 mg/dL    Calcium 8.7 8.7 - 10.5 mg/dL    Total Protein 6.1 6.0 - 8.4 g/dL    Albumin 2.9 (L) 3.5 - 5.2 g/dL    Total Bilirubin 1.1 (H) 0.1 - 1.0 mg/dL    Alkaline Phosphatase 88 55 - 135 U/L    AST 16 10 - 40 U/L    ALT 15 10 - 44 U/L    Anion Gap 10 8 - 16 mmol/L    eGFR >60 >60 mL/min/1.73 m^2   CBC with Automated Differential   Result Value Ref Range    WBC 6.88 3.90 - 12.70 K/uL    RBC 3.69 (L) 4.00 - 5.40 M/uL    Hemoglobin 9.8 (L) 12.0 - 16.0 g/dL    Hematocrit 30.9 (L) 37.0 - 48.5 %    MCV 84 82 - 98 fL    MCH 26.6 (L) 27.0 - 31.0 pg    MCHC 31.7 (L) 32.0 - 36.0 g/dL    RDW 15.2 (H) 11.5 -  14.5 %    Platelets 275 150 - 450 K/uL    MPV 10.3 9.2 - 12.9 fL    Immature Granulocytes 0.1 0.0 - 0.5 %    Gran # (ANC) 4.4 1.8 - 7.7 K/uL    Immature Grans (Abs) 0.01 0.00 - 0.04 K/uL    Lymph # 1.7 1.0 - 4.8 K/uL    Mono # 0.6 0.3 - 1.0 K/uL    Eos # 0.1 0.0 - 0.5 K/uL    Baso # 0.04 0.00 - 0.20 K/uL    nRBC 0 0 /100 WBC    Gran % 64.3 38.0 - 73.0 %    Lymph % 25.1 18.0 - 48.0 %    Mono % 9.2 4.0 - 15.0 %    Eosinophil % 0.7 0.0 - 8.0 %    Basophil % 0.6 0.0 - 1.9 %    Differential Method Automated    APTT   Result Value Ref Range    aPTT 45.9 (H) 21.0 - 32.0 sec   Basic metabolic panel   Result Value Ref Range    Sodium 140 136 - 145 mmol/L    Potassium 3.5 3.5 - 5.1 mmol/L    Chloride 104 95 - 110 mmol/L    CO2 26 23 - 29 mmol/L    Glucose 98 70 - 110 mg/dL    BUN 20 8 - 23 mg/dL    Creatinine 0.9 0.5 - 1.4 mg/dL    Calcium 8.5 (L) 8.7 - 10.5 mg/dL    Anion Gap 10 8 - 16 mmol/L    eGFR >60 >60 mL/min/1.73 m^2   CBC auto differential   Result Value Ref Range    WBC 7.36 3.90 - 12.70 K/uL    RBC 3.70 (L) 4.00 - 5.40 M/uL    Hemoglobin 9.7 (L) 12.0 - 16.0 g/dL    Hematocrit 31.1 (L) 37.0 - 48.5 %    MCV 84 82 - 98 fL    MCH 26.2 (L) 27.0 - 31.0 pg    MCHC 31.2 (L) 32.0 - 36.0 g/dL    RDW 15.3 (H) 11.5 - 14.5 %    Platelets 274 150 - 450 K/uL    MPV 10.3 9.2 - 12.9 fL    Immature Granulocytes 0.3 0.0 - 0.5 %    Gran # (ANC) 5.0 1.8 - 7.7 K/uL    Immature Grans (Abs) 0.02 0.00 - 0.04 K/uL    Lymph # 1.5 1.0 - 4.8 K/uL    Mono # 0.7 0.3 - 1.0 K/uL    Eos # 0.1 0.0 - 0.5 K/uL    Baso # 0.05 0.00 - 0.20 K/uL    nRBC 0 0 /100 WBC    Gran % 68.2 38.0 - 73.0 %    Lymph % 20.2 18.0 - 48.0 %    Mono % 9.1 4.0 - 15.0 %    Eosinophil % 1.5 0.0 - 8.0 %    Basophil % 0.7 0.0 - 1.9 %    Differential Method Automated    Comprehensive Metabolic Panel (CMP)   Result Value Ref Range    Sodium 137 136 - 145 mmol/L    Potassium 3.8 3.5 - 5.1 mmol/L    Chloride 99 95 - 110 mmol/L    CO2 27 23 - 29 mmol/L    Glucose 100 70 - 110 mg/dL     BUN 23 8 - 23 mg/dL    Creatinine 1.0 0.5 - 1.4 mg/dL    Calcium 8.8 8.7 - 10.5 mg/dL    Total Protein 6.1 6.0 - 8.4 g/dL    Albumin 3.0 (L) 3.5 - 5.2 g/dL    Total Bilirubin 1.1 (H) 0.1 - 1.0 mg/dL    Alkaline Phosphatase 91 55 - 135 U/L    AST 12 10 - 40 U/L    ALT 13 10 - 44 U/L    Anion Gap 11 8 - 16 mmol/L    eGFR >60 >60 mL/min/1.73 m^2   APTT   Result Value Ref Range    aPTT 73.7 (H) 21.0 - 32.0 sec   CBC auto differential   Result Value Ref Range    WBC 8.60 3.90 - 12.70 K/uL    RBC 4.14 4.00 - 5.40 M/uL    Hemoglobin 10.9 (L) 12.0 - 16.0 g/dL    Hematocrit 34.7 (L) 37.0 - 48.5 %    MCV 84 82 - 98 fL    MCH 26.3 (L) 27.0 - 31.0 pg    MCHC 31.4 (L) 32.0 - 36.0 g/dL    RDW 15.1 (H) 11.5 - 14.5 %    Platelets 293 150 - 450 K/uL    MPV 9.9 9.2 - 12.9 fL    Immature Granulocytes 0.5 0.0 - 0.5 %    Gran # (ANC) 6.9 1.8 - 7.7 K/uL    Immature Grans (Abs) 0.04 0.00 - 0.04 K/uL    Lymph # 0.8 (L) 1.0 - 4.8 K/uL    Mono # 0.8 0.3 - 1.0 K/uL    Eos # 0.1 0.0 - 0.5 K/uL    Baso # 0.04 0.00 - 0.20 K/uL    nRBC 0 0 /100 WBC    Gran % 80.1 (H) 38.0 - 73.0 %    Lymph % 9.1 (L) 18.0 - 48.0 %    Mono % 9.2 4.0 - 15.0 %    Eosinophil % 0.6 0.0 - 8.0 %    Basophil % 0.5 0.0 - 1.9 %    Differential Method Automated    Basic metabolic panel   Result Value Ref Range    Sodium 137 136 - 145 mmol/L    Potassium 4.0 3.5 - 5.1 mmol/L    Chloride 97 95 - 110 mmol/L    CO2 26 23 - 29 mmol/L    Glucose 127 (H) 70 - 110 mg/dL    BUN 19 8 - 23 mg/dL    Creatinine 1.0 0.5 - 1.4 mg/dL    Calcium 9.1 8.7 - 10.5 mg/dL    Anion Gap 14 8 - 16 mmol/L    eGFR >60 >60 mL/min/1.73 m^2   Echo   Result Value Ref Range    BSA 1.81 m2    TDI SEPTAL 0.09 m/s    LV LATERAL E/E' RATIO 15.75 m/s    LV SEPTAL E/E' RATIO 14.00 m/s    LA WIDTH 3.80 cm    IVC diameter 1.69 cm    Left Ventricular Outflow Tract Mean Velocity 0.63 cm/s    Left Ventricular Outflow Tract Mean Gradient 1.70 mmHg    TDI LATERAL 0.08 m/s    LVIDd 5.08 3.5 - 6.0 cm    IVS 1.51 (A)  0.6 - 1.1 cm    Posterior Wall 1.40 (A) 0.6 - 1.1 cm    Ao root annulus 2.46 cm    LVIDs 4.49 (A) 2.1 - 4.0 cm    FS 12 28 - 44 %    LA volume 47.84 cm3    Sinus 2.41 cm    STJ 2.53 cm    Ascending aorta 2.70 cm    LV mass 315.92 g    LA size 3.52 cm    TAPSE 1.78 cm    Left Ventricle Relative Wall Thickness 0.55 cm    AV mean gradient 17 mmHg    AV valve area 0.88 cm2    AV Velocity Ratio 0.30     AV index (prosthetic) 0.37     MV valve area p 1/2 method 3.23 cm2    E/A ratio 1.19     Mean e' 0.09 m/s    E wave deceleration time 234.50 msec    IVRT 62.80 msec    LVOT diameter 1.74 cm    LVOT area 2.4 cm2    LVOT peak sarmad 0.74 m/s    LVOT peak VTI 17.50 cm    Ao peak sarmad 2.49 m/s    Ao VTI 47.1 cm    RVOT peak sarmad 0.82 m/s    RVOT peak VTI 16.4 cm    Mr max sarmad 5.20 m/s    LVOT stroke volume 41.59 cm3    AV peak gradient 25 mmHg    PV mean gradient 2.05 mmHg    E/E' ratio 14.82 m/s    MV Peak E Sarmad 1.26 m/s    TR Max Sarmad 3.40 m/s    MV stenosis pressure 1/2 time 68.01 ms    MV Peak A Sarmad 1.06 m/s    LV Systolic Volume 91.93 mL    LV Systolic Volume Index 51.4 mL/m2    LV Diastolic Volume 122.55 mL    LV Diastolic Volume Index 68.46 mL/m2    LA Volume Index 26.7 mL/m2    LV Mass Index 176 g/m2    RA Major Axis 4.22 cm    Left Atrium Minor Axis 4.31 cm    Left Atrium Major Axis 4.11 cm    Triscuspid Valve Regurgitation Peak Gradient 46 mmHg    RA Width 3.03 cm    Right Atrial Pressure (from IVC) 15 mmHg    EF 25 %    TV rest pulmonary artery pressure 61 mmHg   Cardiac catheterization   Result Value Ref Range    Cath EF Quantitative 20 %   ISTAT PROCEDURE   Result Value Ref Range    POC PH 7.478 (H) 7.35 - 7.45    POC PCO2 38.4 35 - 45 mmHg    POC PO2 61 (L) 80 - 100 mmHg    POC HCO3 28.5 (H) 24 - 28 mmol/L    POC BE 5 -2 to 2 mmol/L    POC SATURATED O2 93 (L) 95 - 100 %    Sample ARTERIAL     Site LR     Allens Test Pass     DelSys Nasal Can     Mode SPONT     Flow 2     FiO2 28    POCT glucose   Result Value Ref  Range    POCT Glucose 140 (H) 70 - 110 mg/dL   POCT glucose   Result Value Ref Range    POCT Glucose 95 70 - 110 mg/dL   POCT glucose   Result Value Ref Range    POCT Glucose 154 (H) 70 - 110 mg/dL   POCT glucose   Result Value Ref Range    POCT Glucose 93 70 - 110 mg/dL   POCT glucose   Result Value Ref Range    POCT Glucose 146 (H) 70 - 110 mg/dL   POCT glucose   Result Value Ref Range    POCT Glucose 102 70 - 110 mg/dL       Imaging Results:  Imaging Results              X-Ray Chest AP Portable (Final result)  Result time 01/19/23 20:43:22      Final result by Xochitl Andersen MD (01/19/23 20:43:22)                   Impression:      Mild cardiomegaly with mild perihilar interstitial opacity and this likely trace pleural effusions or pleural thickening.  Recommend correlation to element of CHF.      Electronically signed by: Ganesh Leung  Date:    01/19/2023  Time:    20:43               Narrative:    EXAMINATION:  XR CHEST AP PORTABLE    CLINICAL HISTORY:  sob;    TECHNIQUE:  Single frontal view of the chest was performed.    COMPARISON:  None    FINDINGS:  Gaseous distension of the stomach.  Interstitial prominence.  Mild cardiomegaly with atherosclerotic changes.  Blunting of the costophrenic sulci.    Bones are intact.                                       The EKG was ordered, reviewed, and independently interpreted by the ED provider.  Interpretation time: 20:24  Rate: 103 BPM  Rhythm: sinus tachycardia  Interpretation: Inferior-posterior infarct, possibly acute ST and T wave abnormality, consider lateral ischemia. ACUTE MI/STEMI. Consider right ventricular involvement in acute inferior infarct. No STEMI.    The EKG was ordered, reviewed, and independently interpreted by the ED provider.  Interpretation time: 21:19  Rate: 103 BPM  Rhythm: sinus tachycardia  Interpretation: Possible Left atrial enlargement ST and T wave abnormality, consider inferior ischemia ST and T wave abnormality, consider anterolateral  ischemia. No STEMI.           The Emergency Provider reviewed the vital signs and test results, which are outlined above.     ED Discussion     8:45 PM: Discussed pt's case with Dr. York (Cardiology) who states that ECG is a borderline STEMI. Recommends consulting Dr. Vail with Interventional Cardiology.    9:30 PM: Discussed pt's case with Dr. York (Cardiology) who recommends starting heparin drip, Bb, asa statin for ACS.    9:35 PM: Discussed case with Alex Arnold MD ([facility]). Dr. Arnold agrees with current care and management of pt and accepts admission.   Admitting Service: [facility] medicine  Admitting Physician: Dr. Arnold  Admit to: Inpatient med tele    9:35 PM: Re-evaluated pt. I have discussed test results, shared treatment plan, and the need for admission with patient and family at bedside. Pt and family express understanding at this time and agree with all information. All questions answered. Pt and family have no further questions or concerns at this time. Pt is ready for admit.         Medical Decision Making:   Clinical Tests:   Lab Tests: Ordered and Reviewed  Radiological Study: Ordered and Reviewed  Medical Tests: Ordered and Reviewed         ED Medication(s):  Medications   EScitalopram oxalate tablet 10 mg (10 mg Oral Given 1/23/23 0954)   pantoprazole EC tablet 40 mg (40 mg Oral Given 1/23/23 0954)   docusate sodium capsule 100 mg (has no administration in time range)   budesonide nebulizer solution 0.5 mg (0.5 mg Nebulization Given 1/23/23 0828)   arformoteroL nebulizer solution 15 mcg (15 mcg Nebulization Given 1/23/23 0824)   sodium chloride 0.9% flush 3 mL (has no administration in time range)   melatonin tablet 6 mg (has no administration in time range)   ondansetron injection 4 mg (has no administration in time range)   promethazine tablet 25 mg (has no administration in time range)   senna-docusate 8.6-50 mg per tablet 1 tablet (has no administration in time range)    aluminum-magnesium hydroxide-simethicone 200-200-20 mg/5 mL suspension 30 mL (has no administration in time range)   acetaminophen suppository 650 mg (has no administration in time range)   morphine injection 2 mg (has no administration in time range)   naloxone 0.4 mg/mL injection 0.02 mg (has no administration in time range)   glucose chewable tablet 16 g (has no administration in time range)   glucose chewable tablet 24 g (has no administration in time range)   glucagon (human recombinant) injection 1 mg (has no administration in time range)   dextrose 10% bolus 125 mL 125 mL (has no administration in time range)   dextrose 10% bolus 250 mL 250 mL (has no administration in time range)   potassium chloride SA CR tablet 20 mEq (20 mEq Oral Given 1/23/23 0955)   aspirin EC tablet 81 mg (81 mg Oral Given 1/23/23 0954)   albuterol-ipratropium 2.5 mg-0.5 mg/3 mL nebulizer solution 3 mL (3 mLs Nebulization Given 1/23/23 1207)   sodium chloride 0.9% flush 10 mL (has no administration in time range)   atropine injection 0.5 mg (has no administration in time range)   nitroGLYCERIN SL tablet 0.4 mg (has no administration in time range)   acetaminophen tablet 650 mg (has no administration in time range)   HYDROcodone-acetaminophen 5-325 mg per tablet 1 tablet (has no administration in time range)   oxyCODONE immediate release tablet 10 mg (has no administration in time range)   ondansetron disintegrating tablet 8 mg (has no administration in time range)   0.9%  NaCl infusion ( Intravenous New Bag 1/22/23 1034)   acetaminophen tablet 650 mg (has no administration in time range)   apixaban tablet 2.5 mg (2.5 mg Oral Given 1/23/23 0954)   losartan tablet 25 mg (25 mg Oral Not Given 1/23/23 0955)   furosemide tablet 40 mg (has no administration in time range)   aspirin tablet 325 mg (325 mg Oral Given 1/19/23 2048)   albuterol-ipratropium 2.5 mg-0.5 mg/3 mL nebulizer solution 3 mL (3 mLs Nebulization Given 1/19/23 8532)    heparin 25,000 units in dextrose 5% (100 units/ml) IV bolus from bag INITIAL BOLUS (max bolus 4000 units) (4,000 Units Intravenous Bolus from Bag 1/19/23 2253)   atorvastatin tablet 40 mg (40 mg Oral Given 1/19/23 2254)   metoprolol injection 5 mg (5 mg Intravenous Given 1/19/23 2257)   furosemide injection 20 mg (20 mg Intravenous Given 1/19/23 2257)   furosemide injection 40 mg (40 mg Intravenous Given 1/20/23 1515)   diphenhydrAMINE capsule 50 mg (50 mg Oral Given 1/22/23 0611)       Current Discharge Medication List                  Scribe Attestation:   Scribe #1: I performed the above scribed service and the documentation accurately describes the services I performed. I attest to the accuracy of the note.     Attending:   Physician Attestation Statement for Scribe #1: I, Tonie Molina MD, personally performed the services described in this documentation, as scribed by Catalino Cordero, in my presence, and it is both accurate and complete.           Clinical Impression       ICD-10-CM ICD-9-CM   1. NSTEMI (non-ST elevated myocardial infarction)  I21.4 410.70   2. SOB (shortness of breath)  R06.02 786.05   3. Chest pain  R07.9 786.50   4. New onset of congestive heart failure  I50.9 428.0       Disposition:   Disposition: Admitted  Condition: Serious       Tonie Molina MD  01/23/23 8225

## 2023-01-20 NOTE — PLAN OF CARE
Pt remains free of falls/injury this shift. Safety precautions maintained. Pt. denies any pain or discomfort this shift. Muscle cramps managed with prn medications. Heparin infusing. 2L nasal cannula in place. NSR on tele monitor #[id number]. VSS. No signs and symptoms of acute distress noted at this time. 12 hour chart check completed. Plan of care continues.

## 2023-01-20 NOTE — HOSPITAL COURSE
1/20 admitted for new onset congestive heart failure exacerbation. Echocardiogram with ejection fraction 25%. Cardiology consulted and diuresing. Family endorses orthopnea, increased lower extremity edema. Daughter reports patient did not want to come to hospital. Attempted goals of care discussion but patient unable to participate. Critical care consult. Abg pending  1/21 abg within normal limits. Continue monitoring need for nippv. Diuresing well with intravenous lasix. Tachypnea improving. Disoriented at baseline per daughter. Daughter states baclofen causing drowsiness  1/22 status post [facility]. Tolerated well. Remains dyspneic. Continue diuresing. Optimize medication(s) and breathing with diuretics, nitroglycerin, oxygen evaluation. Anticipate discharge 1-2 days.  1/23 S/P  LHC show :Diffuse CAD and medical management. Case D/W Cardiology which rec life vest . NAEON . Denies any new complains .   1/24 Pt was seen and examined at bedside . She was determined to be suitable for d/c . She qulify for home o2 with a o2 sat < 88 % at rest . She will be d/c on losartan , metoprolol , asa , statin and lasix .  Life vest at bedside . She was advised to f/u with her PCP , cardiology and pulmonology ion 1 to 2 weeks .

## 2023-01-20 NOTE — HPI
Violeta Scherer is a 72 y.o. female with a PMH  has a past medical history of Hypertension, Other pulmonary embolism without acute cor pulmonale, and Stroke. who presented to the ED for further evaluation of progressive worsening shortness of breath x2 days duration.  Patient reportedly had acute onset and progressively worsening shortness of breath over the past 3 days with patient now reporting 3-4 pillow orthopnea, PND, dyspnea on minimal exertion, and bilateral lower extremity pitting edema.  Patient denies history of heart failure, COPD, of prior MIs but did have history of previous stroke/TIA and PE's.  Aggravating factors included those noted above with no known alleviating factors.  All other review of systems negative including for lightheadedness, dizziness, headache, visual changes, fever, chills, sweats, nausea, vomiting, chest pain, abdominal pain, dysuria, hematuria, melena, hematochezia, constipation, diarrhea, or onset neurological deficits.  Prior to onset of symptoms, patient reported being in her usual state health no other concerns or complaints.  Initial workup in the ED revealed patient to have evidence consistent with signs/symptoms of new onset CHF and NSTEMI.  Hospital Medicine consulted by ED staff for admission for continued medical management.      PCP: Karrie Birmingham

## 2023-01-20 NOTE — ASSESSMENT & PLAN NOTE
Patient is chronically on statin.will continue for now. Last Lipid Panel: No results found for: CHOL, HDL, LDLCALC, TRIG, CHOLHDL  Plan:  -Continue home medication  -f/u lipid panel   -low fat/low calorie diet

## 2023-01-20 NOTE — ASSESSMENT & PLAN NOTE
NSTEMI, inferior EKG changes and evidence of diffuse LV dysfunction by echo  Continue Heparin IV  ASA  Plan [facility] when able to lie flat  Now with CHF and edema

## 2023-01-20 NOTE — ASSESSMENT & PLAN NOTE
Patient is identified as having Systolic (HFrEF) heart failure that is Acute. CHF is currently uncontrolled due to Continued edema of extremities. Latest ECHO performed and demonstrates- Results for orders placed during the hospital encounter of 01/19/23    Echo    Interpretation Summary  · The left ventricle is moderately enlarged with concentric hypertrophy and severely decreased systolic function.  · The estimated ejection fraction is 25%.  · Grade II left ventricular diastolic dysfunction.  · There is severe left ventricular global hypokinesis.  · Normal right ventricular size with normal right ventricular systolic function.  · There is mild-to-moderate aortic valve stenosis.  · Aortic valve area is 0.88 cm2; peak velocity is 2.49 m/s; mean gradient is 17 mmHg.  · Moderate mitral regurgitation.  · Moderate tricuspid regurgitation.  · Elevated central venous pressure (15 mmHg).  · The estimated PA systolic pressure is 61 mmHg.  · There is pulmonary hypertension.  · Trivial circumferential pericardial effusion. Effusion is fluid.  . Continue Furosemide and monitor clinical status closely. Monitor on telemetry. Patient is on CHF pathway.  Monitor strict Is&Os and daily weights.  Place on fluid restriction of 2 L. Continue to stress to patient importance of self efficacy and  on diet for CHF. Last BNP reviewed- and noted below   Recent Labs   Lab 01/19/23  2341   BNP 2,511*   .  Plan continued diuresis and plan [facility]

## 2023-01-21 LAB
ALBUMIN SERPL BCP-MCNC: 2.9 G/DL (ref 3.5–5.2)
ALP SERPL-CCNC: 88 U/L (ref 55–135)
ALT SERPL W/O P-5'-P-CCNC: 15 U/L (ref 10–44)
ANION GAP SERPL CALC-SCNC: 10 MMOL/L (ref 8–16)
ANION GAP SERPL CALC-SCNC: 10 MMOL/L (ref 8–16)
APTT BLDCRRT: 45.9 SEC (ref 21–32)
AST SERPL-CCNC: 16 U/L (ref 10–40)
BASOPHILS # BLD AUTO: 0.04 K/UL (ref 0–0.2)
BASOPHILS NFR BLD: 0.6 % (ref 0–1.9)
BILIRUB SERPL-MCNC: 1.1 MG/DL (ref 0.1–1)
BUN SERPL-MCNC: 20 MG/DL (ref 8–23)
BUN SERPL-MCNC: 20 MG/DL (ref 8–23)
CALCIUM SERPL-MCNC: 8.5 MG/DL (ref 8.7–10.5)
CALCIUM SERPL-MCNC: 8.7 MG/DL (ref 8.7–10.5)
CHLORIDE SERPL-SCNC: 104 MMOL/L (ref 95–110)
CHLORIDE SERPL-SCNC: 104 MMOL/L (ref 95–110)
CO2 SERPL-SCNC: 26 MMOL/L (ref 23–29)
CO2 SERPL-SCNC: 26 MMOL/L (ref 23–29)
CREAT SERPL-MCNC: 0.9 MG/DL (ref 0.5–1.4)
CREAT SERPL-MCNC: 0.9 MG/DL (ref 0.5–1.4)
DIFFERENTIAL METHOD: ABNORMAL
EOSINOPHIL # BLD AUTO: 0.1 K/UL (ref 0–0.5)
EOSINOPHIL NFR BLD: 0.7 % (ref 0–8)
ERYTHROCYTE [DISTWIDTH] IN BLOOD BY AUTOMATED COUNT: 15.2 % (ref 11.5–14.5)
EST. GFR  (NO RACE VARIABLE): >60 ML/MIN/1.73 M^2
EST. GFR  (NO RACE VARIABLE): >60 ML/MIN/1.73 M^2
GLUCOSE SERPL-MCNC: 97 MG/DL (ref 70–110)
GLUCOSE SERPL-MCNC: 98 MG/DL (ref 70–110)
HCT VFR BLD AUTO: 30.9 % (ref 37–48.5)
HGB BLD-MCNC: 9.8 G/DL (ref 12–16)
IMM GRANULOCYTES # BLD AUTO: 0.01 K/UL (ref 0–0.04)
IMM GRANULOCYTES NFR BLD AUTO: 0.1 % (ref 0–0.5)
LYMPHOCYTES # BLD AUTO: 1.7 K/UL (ref 1–4.8)
LYMPHOCYTES NFR BLD: 25.1 % (ref 18–48)
MCH RBC QN AUTO: 26.6 PG (ref 27–31)
MCHC RBC AUTO-ENTMCNC: 31.7 G/DL (ref 32–36)
MCV RBC AUTO: 84 FL (ref 82–98)
MONOCYTES # BLD AUTO: 0.6 K/UL (ref 0.3–1)
MONOCYTES NFR BLD: 9.2 % (ref 4–15)
NEUTROPHILS # BLD AUTO: 4.4 K/UL (ref 1.8–7.7)
NEUTROPHILS NFR BLD: 64.3 % (ref 38–73)
NRBC BLD-RTO: 0 /100 WBC
PLATELET # BLD AUTO: 275 K/UL (ref 150–450)
PMV BLD AUTO: 10.3 FL (ref 9.2–12.9)
POTASSIUM SERPL-SCNC: 3.5 MMOL/L (ref 3.5–5.1)
POTASSIUM SERPL-SCNC: 3.5 MMOL/L (ref 3.5–5.1)
PROT SERPL-MCNC: 6.1 G/DL (ref 6–8.4)
RBC # BLD AUTO: 3.69 M/UL (ref 4–5.4)
SODIUM SERPL-SCNC: 140 MMOL/L (ref 136–145)
SODIUM SERPL-SCNC: 140 MMOL/L (ref 136–145)
WBC # BLD AUTO: 6.88 K/UL (ref 3.9–12.7)

## 2023-01-21 PROCEDURE — 25000242 PHARM REV CODE 250 ALT 637 W/ HCPCS: Performed by: INTERNAL MEDICINE

## 2023-01-21 PROCEDURE — 63600175 PHARM REV CODE 636 W HCPCS: Performed by: FAMILY MEDICINE

## 2023-01-21 PROCEDURE — 85025 COMPLETE CBC W/AUTO DIFF WBC: CPT | Performed by: HOSPITALIST

## 2023-01-21 PROCEDURE — 99233 SBSQ HOSP IP/OBS HIGH 50: CPT | Mod: ,,, | Performed by: INTERNAL MEDICINE

## 2023-01-21 PROCEDURE — 99900035 HC TECH TIME PER 15 MIN (STAT)

## 2023-01-21 PROCEDURE — 94640 AIRWAY INHALATION TREATMENT: CPT

## 2023-01-21 PROCEDURE — 27000221 HC OXYGEN, UP TO 24 HOURS

## 2023-01-21 PROCEDURE — 25000003 PHARM REV CODE 250: Performed by: HOSPITALIST

## 2023-01-21 PROCEDURE — 94761 N-INVAS EAR/PLS OXIMETRY MLT: CPT

## 2023-01-21 PROCEDURE — 85730 THROMBOPLASTIN TIME PARTIAL: CPT | Performed by: HOSPITALIST

## 2023-01-21 PROCEDURE — 80048 BASIC METABOLIC PNL TOTAL CA: CPT | Mod: XB | Performed by: INTERNAL MEDICINE

## 2023-01-21 PROCEDURE — 99233 PR SUBSEQUENT HOSPITAL CARE,LEVL III: ICD-10-PCS | Mod: ,,, | Performed by: INTERNAL MEDICINE

## 2023-01-21 PROCEDURE — 25000003 PHARM REV CODE 250: Performed by: FAMILY MEDICINE

## 2023-01-21 PROCEDURE — 25000242 PHARM REV CODE 250 ALT 637 W/ HCPCS: Performed by: HOSPITALIST

## 2023-01-21 PROCEDURE — 80053 COMPREHEN METABOLIC PANEL: CPT | Performed by: HOSPITALIST

## 2023-01-21 PROCEDURE — 94799 UNLISTED PULMONARY SVC/PX: CPT

## 2023-01-21 PROCEDURE — 21400001 HC TELEMETRY ROOM

## 2023-01-21 PROCEDURE — 25000003 PHARM REV CODE 250: Performed by: INTERNAL MEDICINE

## 2023-01-21 PROCEDURE — 11000001 HC ACUTE MED/SURG PRIVATE ROOM

## 2023-01-21 RX ORDER — FUROSEMIDE 10 MG/ML
40 INJECTION INTRAMUSCULAR; INTRAVENOUS
Status: DISCONTINUED | OUTPATIENT
Start: 2023-01-21 | End: 2023-01-21

## 2023-01-21 RX ORDER — FUROSEMIDE 10 MG/ML
40 INJECTION INTRAMUSCULAR; INTRAVENOUS DAILY
Status: DISCONTINUED | OUTPATIENT
Start: 2023-01-22 | End: 2023-01-23

## 2023-01-21 RX ADMIN — IPRATROPIUM BROMIDE AND ALBUTEROL SULFATE 3 ML: 2.5; .5 SOLUTION RESPIRATORY (INHALATION) at 07:01

## 2023-01-21 RX ADMIN — ASPIRIN 81 MG: 81 TABLET, COATED ORAL at 09:01

## 2023-01-21 RX ADMIN — IPRATROPIUM BROMIDE AND ALBUTEROL SULFATE 3 ML: 2.5; .5 SOLUTION RESPIRATORY (INHALATION) at 01:01

## 2023-01-21 RX ADMIN — HEPARIN SODIUM 12 UNITS/KG/HR: 10000 INJECTION, SOLUTION INTRAVENOUS at 01:01

## 2023-01-21 RX ADMIN — ESCITALOPRAM OXALATE 10 MG: 10 TABLET ORAL at 09:01

## 2023-01-21 RX ADMIN — BUDESONIDE 0.5 MG: 0.5 INHALANT ORAL at 08:01

## 2023-01-21 RX ADMIN — PANTOPRAZOLE SODIUM 40 MG: 40 TABLET, DELAYED RELEASE ORAL at 09:01

## 2023-01-21 RX ADMIN — IPRATROPIUM BROMIDE AND ALBUTEROL SULFATE 3 ML: 2.5; .5 SOLUTION RESPIRATORY (INHALATION) at 12:01

## 2023-01-21 RX ADMIN — POTASSIUM CHLORIDE 20 MEQ: 1500 TABLET, EXTENDED RELEASE ORAL at 09:01

## 2023-01-21 RX ADMIN — IPRATROPIUM BROMIDE AND ALBUTEROL SULFATE 3 ML: 2.5; .5 SOLUTION RESPIRATORY (INHALATION) at 08:01

## 2023-01-21 RX ADMIN — ARFORMOTEROL TARTRATE 15 MCG: 15 SOLUTION RESPIRATORY (INHALATION) at 07:01

## 2023-01-21 RX ADMIN — BACLOFEN 5 MG: 5 TABLET ORAL at 01:01

## 2023-01-21 RX ADMIN — BUDESONIDE 0.5 MG: 0.5 INHALANT ORAL at 07:01

## 2023-01-21 RX ADMIN — ARFORMOTEROL TARTRATE 15 MCG: 15 SOLUTION RESPIRATORY (INHALATION) at 08:01

## 2023-01-21 RX ADMIN — PANTOPRAZOLE SODIUM 40 MG: 40 TABLET, DELAYED RELEASE ORAL at 08:01

## 2023-01-21 RX ADMIN — FUROSEMIDE 40 MG: 10 INJECTION, SOLUTION INTRAMUSCULAR; INTRAVENOUS at 02:01

## 2023-01-21 NOTE — ASSESSMENT & PLAN NOTE
Patient is doing found to have elevated troponin measuring 0.745 with repeat 0.758.  EKG shows sinus tachycardia with T-wave inversions in inferior and anterior lateral leads. Patient without evidence of chest pain does show cardiomyopathy with presence of new onset heart failure.  May be secondary to demand ischemia but treating as NSTEMI in initiated on heparin.  Cardiology consulted by ED staff and aware of patient and awaiting further evaluation in the morning.  Recommended administering aspirin, statin, and beta-blocker which was provided with the exception of beta-blocker due to possible new onset heart failure and provided     1/21  Cardiology following  On heparin infusion  Planning for ischemic evaluation or lhc when able

## 2023-01-21 NOTE — ASSESSMENT & PLAN NOTE
Patient is identified as having heart failure that is new onset. CHF is currently uncontrolled due to Continued edema of extremities, >3 pillow orthopnea, Rales/crackles on pulmonary exam and Pulmonary edema/pleural effusion on CXR. Latest ECHO performed and demonstrates- No results found for this or any previous visit.  . Continue Beta Blocker and Furosemide and monitor clinical status closely. Monitor on telemetry. Patient is on CHF pathway.  Monitor strict Is&Os and daily weights.  Place on fluid restriction of 1.5 L. Continue to stress to patient importance of self efficacy and  on diet for CHF. Last BNP reviewed- and noted below   Recent Labs   Lab 01/19/23  2341   BNP 2,511*     Intravenous diuresising  Cardiology consulted  Critical care consult for higher level of care  abg pending  If hypercapnic, transfer to icu for nippv    1/21  Awaiting euvolemic/compensated for [facility]

## 2023-01-21 NOTE — NURSING
Nursing remain stable during the night with BP  remaining 90's/50's. Pt more alert, oriented   and talking after MN complaining of muscle cramps. PRN baclofen given.

## 2023-01-21 NOTE — ASSESSMENT & PLAN NOTE
Controlled  No antihtn medication(s) on home medication(s) list  Losartan started   Intravenous diuresis  IV hydralazine prn for SBP>160 or DBP>90       1/21  Hypotension  Discontinue losartan  Reduce lasix dose

## 2023-01-21 NOTE — ASSESSMENT & PLAN NOTE
Patient is identified as having acute systolic heart failure. CHF is currently uncontrolled as evidenced by orthopnea and rales / crackles on pulmonary exam. Last BNP 2511. Patient is on CHF Pathway.    Latest ECHO on January 20, 2023 performed and demonstrates:  · The left ventricle is moderately enlarged with concentric hypertrophy and severely decreased systolic function.  · The estimated ejection fraction is 25%.  · Grade II left ventricular diastolic dysfunction.  · There is severe left ventricular global hypokinesis.  · Normal right ventricular size with normal right ventricular systolic function.  · There is mild-to-moderate aortic valve stenosis.  · Aortic valve area is 0.88 cm2; peak velocity is 2.49 m/s; mean gradient is 17 mmHg.  · Moderate mitral regurgitation. Moderate tricuspid regurgitation.  · Elevated central venous pressure (15 mmHg).  · The estimated PA systolic pressure is 61 mmHg. There is pulmonary hypertension.  · Trivial circumferential pericardial effusion. Effusion is fluid.    - continue to stress to patient importance of self efficacy and  on diet for CHF  - continue Lasix IV daily   - continue telemetry and monitor clinical / hemodynamics status closely  - monitor strict I&Os with daily weights; continue fluid restriction of 1.5L / daily  - Cardiology following and planning ischemic evaluation when more stable  - continue respiratory support as above

## 2023-01-21 NOTE — PROGRESS NOTES
[facility] Medicine  Progress Note    Patient Name: Violeta Scherer  MRN: [id number]  Patient Class: IP- Inpatient   Admission Date: 1/19/2023  Length of Stay: 2 days  Attending Physician: Calvin Schrader MD  Primary Care Provider: Karrie Birmingham MD        Subjective:     Principal Problem:New onset of congestive heart failure        HPI:  Violeta Scherer is a 72 y.o. female with a PMH  has a past medical history of Hypertension, Other pulmonary embolism without acute cor pulmonale, and Stroke. who presented to the ED for further evaluation of progressive worsening shortness of breath x2 days duration.  Patient reportedly had acute onset and progressively worsening shortness of breath over the past 3 days with patient now reporting 3-4 pillow orthopnea, PND, dyspnea on minimal exertion, and bilateral lower extremity pitting edema.  Patient denies history of heart failure, COPD, of prior MIs but did have history of previous stroke/TIA and PE's.  Aggravating factors included those noted above with no known alleviating factors.  All other review of systems negative including for lightheadedness, dizziness, headache, visual changes, fever, chills, sweats, nausea, vomiting, chest pain, abdominal pain, dysuria, hematuria, melena, hematochezia, constipation, diarrhea, or onset neurological deficits.  Prior to onset of symptoms, patient reported being in her usual state health no other concerns or complaints.  Initial workup in the ED revealed patient to have evidence consistent with signs/symptoms of new onset CHF and NSTEMI.  Hospital Medicine consulted by ED staff for admission for continued medical management.      PCP: Karrie Birmingham        Overview/Hospital Course:  1/20 admitted for new onset congestive heart failure exacerbation. Echocardiogram with ejection fraction 25%. Cardiology consulted and diuresing. Family endorses orthopnea, increased lower extremity edema. Daughter reports patient did not  want to come to hospital. Attempted goals of care discussion but patient unable to participate. Critical care consult. Abg pending  1/21 abg within normal limits. Continue monitoring need for nippv. Diuresing well with intravenous lasix. Tachypnea improving. Disoriented at baseline per daughter. Daughter states baclofen causing drowsiness      Interval History: See hospital course for today      Review of Systems   Unable to perform ROS: Acuity of condition (daughter provides collateral)   Respiratory:  Positive for shortness of breath (improved).    Psychiatric/Behavioral:  Negative for sleep disturbance.    Objective:     Vital Signs (Most Recent):  Temp: 97.8 °F (36.6 °C) (01/21/23 0742)  Pulse: 93 (01/21/23 0910)  Resp: (!) 22 (01/21/23 0809)  BP: (!) 113/56 (01/21/23 0742)  SpO2: 95 % (01/21/23 0809)   Vital Signs (24h Range):  Temp:  [97.1 °F (36.2 °C)-98.3 °F (36.8 °C)] 97.8 °F (36.6 °C)  Pulse:  [] 93  Resp:  [16-22] 22  SpO2:  [92 %-99 %] 95 %  BP: ()/(52-81) 113/56     Weight: 70.3 kg (155 lb)  Body mass index is 25.02 kg/m².    Intake/Output Summary (Last 24 hours) at 1/21/2023 1040  Last data filed at 1/21/2023 0054  Gross per 24 hour   Intake 188.58 ml   Output 1300 ml   Net -1111.42 ml      Physical Exam  Vitals and nursing note reviewed. Exam conducted with a chaperone present (daughter).   Constitutional:       General: She is sleeping. She is not in acute distress.     Appearance: She is ill-appearing. She is not toxic-appearing.      Interventions: Nasal cannula in place.   HENT:      Head: Normocephalic and atraumatic.   Cardiovascular:      Rate and Rhythm: Normal rate.   Pulmonary:      Effort: Tachypnea and respiratory distress present.      Breath sounds: Decreased air movement present.   Abdominal:      General: There is distension.      Palpations: Abdomen is soft.      Tenderness: There is no abdominal tenderness.   Genitourinary:     Comments: sullivan  Musculoskeletal:      Right  lower leg: Edema present.      Left lower leg: Edema present.   Skin:     General: Skin is warm and dry.      Coloration: Skin is pale.      Findings: Bruising present.   Neurological:      Mental Status: She is easily aroused. Mental status is at baseline. She is disoriented.   Psychiatric:         Behavior: Behavior is cooperative.       Significant Labs: All pertinent labs within the past 24 hours have been reviewed.  CBC:   Recent Labs   Lab 01/20/23  0406 01/20/23  0540 01/21/23  0645   WBC 7.59 7.41 6.88   HGB 10.0* 10.2* 9.8*   HCT 31.7* 31.8* 30.9*    275 275     CMP:   Recent Labs   Lab 01/19/23  2044 01/20/23  0406 01/21/23  0645    141 140  140   K 3.4* 3.4* 3.5  3.5    106 104  104   CO2 23 26 26  26   * 107 97  98   BUN 21 18 20  20   CREATININE 1.0 0.8 0.9  0.9   CALCIUM 9.3 8.5* 8.7  8.5*   PROT 7.0 6.0 6.1   ALBUMIN 3.3* 2.9* 2.9*   BILITOT 0.7 0.6 1.1*   ALKPHOS 108 90 88   AST 20 18 16   ALT 19 16 15   ANIONGAP 13 9 10  10     Cardiac Markers:   Recent Labs   Lab 01/19/23  2341   BNP 2,511*     Troponin:   Recent Labs   Lab 01/19/23 2044 01/19/23  2341 01/20/23  0607   TROPONINI 0.745* 0.758* 1.019*       Significant Imaging: I have reviewed all pertinent imaging results/findings within the past 24 hours.      Assessment/Plan:      * New onset of congestive heart failure  Patient is identified as having heart failure that is new onset. CHF is currently uncontrolled due to Continued edema of extremities, >3 pillow orthopnea, Rales/crackles on pulmonary exam and Pulmonary edema/pleural effusion on CXR. Latest ECHO performed and demonstrates- No results found for this or any previous visit.  . Continue Beta Blocker and Furosemide and monitor clinical status closely. Monitor on telemetry. Patient is on CHF pathway.  Monitor strict Is&Os and daily weights.  Place on fluid restriction of 1.5 L. Continue to stress to patient importance of self efficacy and  on  diet for CHF. Last BNP reviewed- and noted below   Recent Labs   Lab 01/19/23  2341   BNP 2,511*     Intravenous diuresising  Cardiology consulted  Critical care consult for higher level of care  abg pending  If hypercapnic, transfer to icu for nippv    1/21  Awaiting euvolemic/compensated for lhc      GERD (gastroesophageal reflux disease)  .Chronic. Stable. Currently asymptomatic. Home medications include PPI/Antacids as needed.  Plan:  -Continue PPI/Antacids as needed       Depression  Chronic. Stable. Not in acute exacerbation and currently denies endorsing any suicidal/homicidal ideations.   Plan:  -Continue home medications       HLD (hyperlipidemia)  Patient is chronically on statin.will continue for now. Last Lipid Panel:   Lab Results   Component Value Date    CHOL 132 01/20/2023    HDL 45 01/20/2023    LDLCALC 75.8 01/20/2023    TRIG 56 01/20/2023    CHOLHDL 34.1 01/20/2023     Plan:  -Continue home medication  Lipid panel within normal limits         NSTEMI (non-ST elevated myocardial infarction)  Ischemic evaluation or lhc per cardiology when more compensated       Elevated troponin  Patient is doing found to have elevated troponin measuring 0.745 with repeat 0.758.  EKG shows sinus tachycardia with T-wave inversions in inferior and anterior lateral leads. Patient without evidence of chest pain does show cardiomyopathy with presence of new onset heart failure.  May be secondary to demand ischemia but treating as NSTEMI in initiated on heparin.  Cardiology consulted by ED staff and aware of patient and awaiting further evaluation in the morning.  Recommended administering aspirin, statin, and beta-blocker which was provided with the exception of beta-blocker due to possible new onset heart failure and provided     1/21  Cardiology following  On heparin infusion  Planning for ischemic evaluation or lhc when able    Shortness of breath  Patient presented with worsening dyspnea/shortness of breath as noted  above likely secondary to new onset CHF.  Patient currently saturating % on room air.  Respiratory rate ranging from 17-26.  She remains afebrile without leukocytosis with a/HI baseline currently measuring 10.8/34.6.  BNP elevated at 1940 with chest x-ray revealing mild cardiomegaly with mild perihilar interstitial opacity and this likely trace pleural effusions or pleural thickening.  D-dimer not obtained in the ED.  History of PE in the past but not severely hypoxic or tachycardic with tachypnea improving following diuresing.  Patient provided 20 mg IV Lasix x1.  Cardiology consulted and following patient.  Plan:  See congestive heart failure plan      History of pulmonary embolus (PE)  On heparin gtt       History of CVA (cerebrovascular accident)  Patient with history remote CVA and PE proximally 4 years prior to admission previously treated with Eliquis.  Patient does present with some concerns of recurrent PE but has likely cause of shortness a breath, tachypnea, and hypoxia given CHF exacerbation.  We will obtain D-dimer as it was not obtained in the ED and consider CTA for definitive rule out of PE given history and presentation.  Currently on heparin drip for treatment of NSTEMI with cardiology following and awaiting further evaluation/recommendations.  Plan:  Does not wear supplemental oxygen at baseline  -reat CHF exacerbation and NSTEMI as noted above  elevated d-dimer  vq scan low prob pulmonary embolism       Primary hypertension  Controlled  No antihtn medication(s) on home medication(s) list  Losartan started   Intravenous diuresis  IV hydralazine prn for SBP>160 or DBP>90       1/21  Hypotension  Discontinue losartan  Reduce lasix dose      VTE Risk Mitigation (From admission, onward)         Ordered     Reason for No Pharmacological VTE Prophylaxis  Once        Question:  Reasons:  Answer:  Physician Provided (leave comment)  Comment:  on heparin drip    01/19/23 0015     IP VTE HIGH RISK  PATIENT  Once         01/19/23 2246     Place sequential compression device  Until discontinued         01/19/23 2246     heparin 25,000 units in dextrose 5% (100 units/ml) IV bolus from bag - ADDITIONAL PRN BOLUS - 60 units/kg (max bolus 4000 units)  As needed (PRN)        Question:  Heparin Infusion Adjustment (DO NOT MODIFY ANSWER)  Answer:  [email or web address]\[email or web address]\Images\Pharmacy\HeparinInfusions\heparin LOW INTENSITY nomogram for [facility] [id number]    01/19/23 2126     heparin 25,000 units in dextrose 5% (100 units/ml) IV bolus from bag - ADDITIONAL PRN BOLUS - 30 units/kg (max bolus 4000 units)  As needed (PRN)        Question:  Heparin Infusion Adjustment (DO NOT MODIFY ANSWER)  Answer:  [email or web address]\Images\Pharmacy\HeparinInfusions\heparin LOW INTENSITY nomogram for [facility] [id number]    01/19/23 2126     heparin 25,000 units in dextrose 5% 250 mL (100 units/mL) infusion LOW INTENSITY nomogram - [facility]  Continuous        Question Answer Comment   Heparin Infusion Adjustment (DO NOT MODIFY ANSWER) [email or web address]\epic\Images\Pharmacy\HeparinInfusions\heparin LOW INTENSITY nomogram for [facility] [id number]    Begin at (in units/kg/hr) 12        01/19/23 2126                Discharge Planning   SHIVANI:      Code Status: Full Code   Is the patient medically ready for discharge?:     Reason for patient still in hospital (select all that apply): Patient trending condition, Laboratory test, Treatment, Imaging and Consult recommendations  Discharge Plan A: Home with family                  Calvin Schrader MD  Department of Hospital Medicine   '[facility]

## 2023-01-21 NOTE — PROGRESS NOTES
[facility]  Cardiology  Progress Note    Patient Name: Violeta Scherer  MRN: [id number]  Admission Date: 1/19/2023  Hospital Length of Stay: 2 days  Code Status: Full Code   Attending Physician: Calvin Schrader MD   Primary Care Physician: Karrie Birmingham MD  Expected Discharge Date:   Principal Problem:New onset of congestive heart failure    Subjective:     Hospital Course:   1/21/23-Patient stable and plan cath tomorrow      Interval History: stable, [facility] tomorrow    Review of Systems   Constitutional: Negative for chills, diaphoresis, night sweats, weight gain and weight loss.   HENT:  Negative for congestion, hoarse voice, sore throat and stridor.    Eyes:  Negative for double vision and pain.   Cardiovascular:  Negative for chest pain, claudication, cyanosis, dyspnea on exertion, irregular heartbeat, leg swelling, near-syncope, orthopnea, palpitations, paroxysmal nocturnal dyspnea and syncope.   Respiratory:  Negative for cough, hemoptysis, shortness of breath, sleep disturbances due to breathing, snoring, sputum production and wheezing.    Endocrine: Negative for cold intolerance, heat intolerance and polydipsia.   Hematologic/Lymphatic: Negative for bleeding problem. Does not bruise/bleed easily.   Skin:  Negative for color change, dry skin and rash.   Musculoskeletal:  Negative for joint swelling and muscle cramps.   Gastrointestinal:  Negative for bloating, abdominal pain, constipation, diarrhea, dysphagia, melena, nausea and vomiting.   Genitourinary:  Negative for flank pain and urgency.   Neurological:  Negative for dizziness, focal weakness, headaches, light-headedness, loss of balance, seizures and weakness.   Psychiatric/Behavioral:  Negative for altered mental status and memory loss. The patient is not nervous/anxious.    Objective:     Vital Signs (Most Recent):  Temp: 97.6 °F (36.4 °C) (01/21/23 1143)  Pulse: 97 (01/21/23 1345)  Resp: 20 (01/21/23 1345)  BP: (!) 103/53 (01/21/23 1143)  SpO2:  (!) 94 % (01/21/23 1345)   Vital Signs (24h Range):  Temp:  [97.1 °F (36.2 °C)-98.3 °F (36.8 °C)] 97.6 °F (36.4 °C)  Pulse:  [80-97] 97  Resp:  [16-22] 20  SpO2:  [92 %-99 %] 94 %  BP: ()/(52-69) 103/53     Weight: 70.3 kg (155 lb)  Body mass index is 25.02 kg/m².     SpO2: (!) 94 %         Intake/Output Summary (Last 24 hours) at 1/21/2023 1425  Last data filed at 1/21/2023 0054  Gross per 24 hour   Intake 188.58 ml   Output 1300 ml   Net -1111.42 ml       Lines/Drains/Airways       Peripheral Intravenous Line  Duration                  Peripheral IV - Single Lumen 01/19/23 2038 18 G Right Antecubital 1 day         Peripheral IV - Single Lumen 01/19/23 2044 18 G Left Antecubital 1 day                    Physical Exam  Eyes:      Pupils: Pupils are equal, round, and reactive to light.   Neck:      Trachea: No tracheal deviation.   Cardiovascular:      Rate and Rhythm: Normal rate and regular rhythm.      Pulses: Intact distal pulses.           Carotid pulses are 2+ on the right side and 2+ on the left side.       Radial pulses are 2+ on the right side and 2+ on the left side.        Femoral pulses are 2+ on the right side and 2+ on the left side.       Popliteal pulses are 2+ on the right side and 2+ on the left side.        Dorsalis pedis pulses are 2+ on the right side and 2+ on the left side.        Posterior tibial pulses are 2+ on the right side and 2+ on the left side.      Heart sounds: Normal heart sounds. No murmur heard.    No friction rub. No gallop.   Pulmonary:      Effort: Pulmonary effort is normal. No respiratory distress.      Breath sounds: Normal breath sounds. No stridor. No wheezing or rales.   Chest:      Chest wall: No tenderness.   Abdominal:      General: There is no distension.      Tenderness: There is no abdominal tenderness. There is no rebound.   Musculoskeletal:         General: No tenderness.      Cervical back: Normal range of motion.   Skin:     General: Skin is warm and dry.    Neurological:      Mental Status: She is alert and oriented to person, place, and time.       Significant Labs: CBC   Recent Labs   Lab 01/20/23  0406 01/20/23  0540 01/21/23  0645   WBC 7.59 7.41 6.88   HGB 10.0* 10.2* 9.8*   HCT 31.7* 31.8* 30.9*    275 275    and Troponin   Recent Labs   Lab 01/19/23  2044 01/19/23  2341 01/20/23  0607   TROPONINI 0.745* 0.758* 1.019*       Significant Imaging: Echocardiogram: Transthoracic echo (TTE) complete (Cupid Only):   Results for orders placed or performed during the hospital encounter of 01/19/23   Echo   Result Value Ref Range    BSA 1.81 m2    TDI SEPTAL 0.09 m/s    LV LATERAL E/E' RATIO 15.75 m/s    LV SEPTAL E/E' RATIO 14.00 m/s    LA WIDTH 3.80 cm    IVC diameter 1.69 cm    Left Ventricular Outflow Tract Mean Velocity 0.63 cm/s    Left Ventricular Outflow Tract Mean Gradient 1.70 mmHg    TDI LATERAL 0.08 m/s    LVIDd 5.08 3.5 - 6.0 cm    IVS 1.51 (A) 0.6 - 1.1 cm    Posterior Wall 1.40 (A) 0.6 - 1.1 cm    Ao root annulus 2.46 cm    LVIDs 4.49 (A) 2.1 - 4.0 cm    FS 12 28 - 44 %    LA volume 47.84 cm3    Sinus 2.41 cm    STJ 2.53 cm    Ascending aorta 2.70 cm    LV mass 315.92 g    LA size 3.52 cm    TAPSE 1.78 cm    Left Ventricle Relative Wall Thickness 0.55 cm    AV mean gradient 17 mmHg    AV valve area 0.88 cm2    AV Velocity Ratio 0.30     AV index (prosthetic) 0.37     MV valve area p 1/2 method 3.23 cm2    E/A ratio 1.19     Mean e' 0.09 m/s    E wave deceleration time 234.50 msec    IVRT 62.80 msec    LVOT diameter 1.74 cm    LVOT area 2.4 cm2    LVOT peak sarmad 0.74 m/s    LVOT peak VTI 17.50 cm    Ao peak sarmad 2.49 m/s    Ao VTI 47.1 cm    RVOT peak sarmad 0.82 m/s    RVOT peak VTI 16.4 cm    Mr max sarmad 5.20 m/s    LVOT stroke volume 41.59 cm3    AV peak gradient 25 mmHg    PV mean gradient 2.05 mmHg    E/E' ratio 14.82 m/s    MV Peak E Sarmad 1.26 m/s    TR Max Sarmad 3.40 m/s    MV stenosis pressure 1/2 time 68.01 ms    MV Peak A Sarmad 1.06 m/s    LV Systolic  Volume 91.93 mL    LV Systolic Volume Index 51.4 mL/m2    LV Diastolic Volume 122.55 mL    LV Diastolic Volume Index 68.46 mL/m2    LA Volume Index 26.7 mL/m2    LV Mass Index 176 g/m2    RA Major Axis 4.22 cm    Left Atrium Minor Axis 4.31 cm    Left Atrium Major Axis 4.11 cm    Triscuspid Valve Regurgitation Peak Gradient 46 mmHg    RA Width 3.03 cm    Right Atrial Pressure (from IVC) 15 mmHg    EF 25 %    TV rest pulmonary artery pressure 61 mmHg    Narrative    · The left ventricle is moderately enlarged with concentric hypertrophy   and severely decreased systolic function.  · The estimated ejection fraction is 25%.  · Grade II left ventricular diastolic dysfunction.  · There is severe left ventricular global hypokinesis.  · Normal right ventricular size with normal right ventricular systolic   function.  · There is mild-to-moderate aortic valve stenosis.  · Aortic valve area is 0.88 cm2; peak velocity is 2.49 m/s; mean gradient   is 17 mmHg.  · Moderate mitral regurgitation.  · Moderate tricuspid regurgitation.  · Elevated central venous pressure (15 mmHg).  · The estimated PA systolic pressure is 61 mmHg.  · There is pulmonary hypertension.  · Trivial circumferential pericardial effusion. Effusion is fluid.        Assessment and Plan:     Brief HPI: plan [facility] tomorrow    * New onset of congestive heart failure  Patient is identified as having Systolic (HFrEF) heart failure that is Acute. CHF is currently uncontrolled due to Continued edema of extremities. Latest ECHO performed and demonstrates- Results for orders placed during the hospital encounter of 01/19/23    Echo    Interpretation Summary  · The left ventricle is moderately enlarged with concentric hypertrophy and severely decreased systolic function.  · The estimated ejection fraction is 25%.  · Grade II left ventricular diastolic dysfunction.  · There is severe left ventricular global hypokinesis.  · Normal right ventricular size with normal right  ventricular systolic function.  · There is mild-to-moderate aortic valve stenosis.  · Aortic valve area is 0.88 cm2; peak velocity is 2.49 m/s; mean gradient is 17 mmHg.  · Moderate mitral regurgitation.  · Moderate tricuspid regurgitation.  · Elevated central venous pressure (15 mmHg).  · The estimated PA systolic pressure is 61 mmHg.  · There is pulmonary hypertension.  · Trivial circumferential pericardial effusion. Effusion is fluid.  . Continue Furosemide and monitor clinical status closely. Monitor on telemetry. Patient is on CHF pathway.  Monitor strict Is&Os and daily weights.  Place on fluid restriction of 2 L. Continue to stress to patient importance of self efficacy and  on diet for CHF. Last BNP reviewed- and noted below   Recent Labs   Lab 01/19/23  2341   BNP 2,511*   .  Plan continued diuresis and plan LHC    NSTEMI (non-ST elevated myocardial infarction)  Continue heparin and ASA  Plan LHC when feasible    Elevated troponin  NSTEMI, inferior EKG changes and evidence of diffuse LV dysfunction by echo  Continue Heparin IV  ASA  Plan LH when able to lie flat  Now with CHF and edema    Shortness of breath  See CHF  Unable to lie flat at this time    1/21/23  Stable and able to lie flat today    History of pulmonary embolus (PE)  stable        VTE Risk Mitigation (From admission, onward)         Ordered     Reason for No Pharmacological VTE Prophylaxis  Once        Question:  Reasons:  Answer:  Physician Provided (leave comment)  Comment:  on heparin drip    01/19/23 2246     IP VTE HIGH RISK PATIENT  Once         01/19/23 2246     Place sequential compression device  Until discontinued         01/19/23 2246     heparin 25,000 units in dextrose 5% (100 units/ml) IV bolus from bag - ADDITIONAL PRN BOLUS - 60 units/kg (max bolus 4000 units)  As needed (PRN)        Question:  Heparin Infusion Adjustment (DO NOT MODIFY ANSWER)  Answer:  [email or web address]\epic\Images\Pharmacy\HeparinInfusions\heparin LOW  INTENSITY nomogram for [facility] [id number]    01/19/23 2126     heparin 25,000 units in dextrose 5% (100 units/ml) IV bolus from bag - ADDITIONAL PRN BOLUS - 30 units/kg (max bolus 4000 units)  As needed (PRN)        Question:  Heparin Infusion Adjustment (DO NOT MODIFY ANSWER)  Answer:  [email or web address]\epic\Images\Pharmacy\HeparinInfusions\heparin LOW INTENSITY nomogram for [facility] [id number]    01/19/23 2126     heparin 25,000 units in dextrose 5% 250 mL (100 units/mL) infusion LOW INTENSITY nomogram - [facility]  Continuous        Question Answer Comment   Heparin Infusion Adjustment (DO NOT MODIFY ANSWER) \\[email or web address]\epic\Images\Pharmacy\HeparinInfusions\heparin LOW INTENSITY nomogram for [facility] [id number]    Begin at (in units/kg/hr) 12        01/19/23 2126                Federico Reyna MD  Cardiology  [facility]

## 2023-01-21 NOTE — ASSESSMENT & PLAN NOTE
- likely secondary to volume overload  - continue Lasix IV daily  - wheezing likely secondary to pulmonary edema  - continue scheduled nebs  - continue Pulmicort and Brovana  - continue supplemental oxygen to target goal SpO2 > 88%  - Bipap as needed for pulmonary decline

## 2023-01-21 NOTE — SUBJECTIVE & OBJECTIVE
Interval History: stable, [facility] tomorrow    Review of Systems   Constitutional: Negative for chills, diaphoresis, night sweats, weight gain and weight loss.   HENT:  Negative for congestion, hoarse voice, sore throat and stridor.    Eyes:  Negative for double vision and pain.   Cardiovascular:  Negative for chest pain, claudication, cyanosis, dyspnea on exertion, irregular heartbeat, leg swelling, near-syncope, orthopnea, palpitations, paroxysmal nocturnal dyspnea and syncope.   Respiratory:  Negative for cough, hemoptysis, shortness of breath, sleep disturbances due to breathing, snoring, sputum production and wheezing.    Endocrine: Negative for cold intolerance, heat intolerance and polydipsia.   Hematologic/Lymphatic: Negative for bleeding problem. Does not bruise/bleed easily.   Skin:  Negative for color change, dry skin and rash.   Musculoskeletal:  Negative for joint swelling and muscle cramps.   Gastrointestinal:  Negative for bloating, abdominal pain, constipation, diarrhea, dysphagia, melena, nausea and vomiting.   Genitourinary:  Negative for flank pain and urgency.   Neurological:  Negative for dizziness, focal weakness, headaches, light-headedness, loss of balance, seizures and weakness.   Psychiatric/Behavioral:  Negative for altered mental status and memory loss. The patient is not nervous/anxious.    Objective:     Vital Signs (Most Recent):  Temp: 97.6 °F (36.4 °C) (01/21/23 1143)  Pulse: 97 (01/21/23 1345)  Resp: 20 (01/21/23 1345)  BP: (!) 103/53 (01/21/23 1143)  SpO2: (!) 94 % (01/21/23 1345)   Vital Signs (24h Range):  Temp:  [97.1 °F (36.2 °C)-98.3 °F (36.8 °C)] 97.6 °F (36.4 °C)  Pulse:  [80-97] 97  Resp:  [16-22] 20  SpO2:  [92 %-99 %] 94 %  BP: ()/(52-69) 103/53     Weight: 70.3 kg (155 lb)  Body mass index is 25.02 kg/m².     SpO2: (!) 94 %         Intake/Output Summary (Last 24 hours) at 1/21/2023 1425  Last data filed at 1/21/2023 0054  Gross per 24 hour   Intake 188.58 ml   Output  1300 ml   Net -1111.42 ml       Lines/Drains/Airways       Peripheral Intravenous Line  Duration                  Peripheral IV - Single Lumen 01/19/23 2038 18 G Right Antecubital 1 day         Peripheral IV - Single Lumen 01/19/23 2044 18 G Left Antecubital 1 day                    Physical Exam  Eyes:      Pupils: Pupils are equal, round, and reactive to light.   Neck:      Trachea: No tracheal deviation.   Cardiovascular:      Rate and Rhythm: Normal rate and regular rhythm.      Pulses: Intact distal pulses.           Carotid pulses are 2+ on the right side and 2+ on the left side.       Radial pulses are 2+ on the right side and 2+ on the left side.        Femoral pulses are 2+ on the right side and 2+ on the left side.       Popliteal pulses are 2+ on the right side and 2+ on the left side.        Dorsalis pedis pulses are 2+ on the right side and 2+ on the left side.        Posterior tibial pulses are 2+ on the right side and 2+ on the left side.      Heart sounds: Normal heart sounds. No murmur heard.    No friction rub. No gallop.   Pulmonary:      Effort: Pulmonary effort is normal. No respiratory distress.      Breath sounds: Normal breath sounds. No stridor. No wheezing or rales.   Chest:      Chest wall: No tenderness.   Abdominal:      General: There is no distension.      Tenderness: There is no abdominal tenderness. There is no rebound.   Musculoskeletal:         General: No tenderness.      Cervical back: Normal range of motion.   Skin:     General: Skin is warm and dry.   Neurological:      Mental Status: She is alert and oriented to person, place, and time.       Significant Labs: CBC   Recent Labs   Lab 01/20/23  0406 01/20/23  0540 01/21/23  0645   WBC 7.59 7.41 6.88   HGB 10.0* 10.2* 9.8*   HCT 31.7* 31.8* 30.9*    275 275    and Troponin   Recent Labs   Lab 01/19/23  2044 01/19/23  2341 01/20/23  0607   TROPONINI 0.745* 0.758* 1.019*       Significant Imaging: Echocardiogram:  Transthoracic echo (TTE) complete (Cupid Only):   Results for orders placed or performed during the hospital encounter of 01/19/23   Echo   Result Value Ref Range    BSA 1.81 m2    TDI SEPTAL 0.09 m/s    LV LATERAL E/E' RATIO 15.75 m/s    LV SEPTAL E/E' RATIO 14.00 m/s    LA WIDTH 3.80 cm    IVC diameter 1.69 cm    Left Ventricular Outflow Tract Mean Velocity 0.63 cm/s    Left Ventricular Outflow Tract Mean Gradient 1.70 mmHg    TDI LATERAL 0.08 m/s    LVIDd 5.08 3.5 - 6.0 cm    IVS 1.51 (A) 0.6 - 1.1 cm    Posterior Wall 1.40 (A) 0.6 - 1.1 cm    Ao root annulus 2.46 cm    LVIDs 4.49 (A) 2.1 - 4.0 cm    FS 12 28 - 44 %    LA volume 47.84 cm3    Sinus 2.41 cm    STJ 2.53 cm    Ascending aorta 2.70 cm    LV mass 315.92 g    LA size 3.52 cm    TAPSE 1.78 cm    Left Ventricle Relative Wall Thickness 0.55 cm    AV mean gradient 17 mmHg    AV valve area 0.88 cm2    AV Velocity Ratio 0.30     AV index (prosthetic) 0.37     MV valve area p 1/2 method 3.23 cm2    E/A ratio 1.19     Mean e' 0.09 m/s    E wave deceleration time 234.50 msec    IVRT 62.80 msec    LVOT diameter 1.74 cm    LVOT area 2.4 cm2    LVOT peak sarmad 0.74 m/s    LVOT peak VTI 17.50 cm    Ao peak sarmad 2.49 m/s    Ao VTI 47.1 cm    RVOT peak sarmad 0.82 m/s    RVOT peak VTI 16.4 cm    Mr max sarmad 5.20 m/s    LVOT stroke volume 41.59 cm3    AV peak gradient 25 mmHg    PV mean gradient 2.05 mmHg    E/E' ratio 14.82 m/s    MV Peak E Sarmad 1.26 m/s    TR Max Sarmad 3.40 m/s    MV stenosis pressure 1/2 time 68.01 ms    MV Peak A Sarmad 1.06 m/s    LV Systolic Volume 91.93 mL    LV Systolic Volume Index 51.4 mL/m2    LV Diastolic Volume 122.55 mL    LV Diastolic Volume Index 68.46 mL/m2    LA Volume Index 26.7 mL/m2    LV Mass Index 176 g/m2    RA Major Axis 4.22 cm    Left Atrium Minor Axis 4.31 cm    Left Atrium Major Axis 4.11 cm    Triscuspid Valve Regurgitation Peak Gradient 46 mmHg    RA Width 3.03 cm    Right Atrial Pressure (from IVC) 15 mmHg    EF 25 %    TV rest  pulmonary artery pressure 61 mmHg    Narrative    · The left ventricle is moderately enlarged with concentric hypertrophy   and severely decreased systolic function.  · The estimated ejection fraction is 25%.  · Grade II left ventricular diastolic dysfunction.  · There is severe left ventricular global hypokinesis.  · Normal right ventricular size with normal right ventricular systolic   function.  · There is mild-to-moderate aortic valve stenosis.  · Aortic valve area is 0.88 cm2; peak velocity is 2.49 m/s; mean gradient   is 17 mmHg.  · Moderate mitral regurgitation.  · Moderate tricuspid regurgitation.  · Elevated central venous pressure (15 mmHg).  · The estimated PA systolic pressure is 61 mmHg.  · There is pulmonary hypertension.  · Trivial circumferential pericardial effusion. Effusion is fluid.

## 2023-01-21 NOTE — ASSESSMENT & PLAN NOTE
Patient is chronically on statin.will continue for now. Last Lipid Panel:   Lab Results   Component Value Date    CHOL 132 01/20/2023    HDL 45 01/20/2023    LDLCALC 75.8 01/20/2023    TRIG 56 01/20/2023    CHOLHDL 34.1 01/20/2023     Plan:  -Continue home medication  Lipid panel within normal limits

## 2023-01-21 NOTE — ASSESSMENT & PLAN NOTE
Patient is identified as having Systolic (HFrEF) heart failure that is Acute. CHF is currently uncontrolled due to Continued edema of extremities. Latest ECHO performed and demonstrates- Results for orders placed during the hospital encounter of 01/19/23    Echo    Interpretation Summary  · The left ventricle is moderately enlarged with concentric hypertrophy and severely decreased systolic function.  · The estimated ejection fraction is 25%.  · Grade II left ventricular diastolic dysfunction.  · There is severe left ventricular global hypokinesis.  · Normal right ventricular size with normal right ventricular systolic function.  · There is mild-to-moderate aortic valve stenosis.  · Aortic valve area is 0.88 cm2; peak velocity is 2.49 m/s; mean gradient is 17 mmHg.  · Moderate mitral regurgitation.  · Moderate tricuspid regurgitation.  · Elevated central venous pressure (15 mmHg).  · The estimated PA systolic pressure is 61 mmHg.  · There is pulmonary hypertension.  · Trivial circumferential pericardial effusion. Effusion is fluid.  . Continue Furosemide and monitor clinical status closely. Monitor on telemetry. Patient is on CHF pathway.  Monitor strict Is&Os and daily weights.  Place on fluid restriction of 2 L. Continue to stress to patient importance of self efficacy and  on diet for CHF. Last BNP reviewed- and noted below   Recent Labs   Lab 01/19/23  2341   BNP 2,511*   .  Plan continued diuresis and plan [facility]

## 2023-01-21 NOTE — SUBJECTIVE & OBJECTIVE
Objective:     Vital Signs (Most Recent):  Temp: 97.6 °F (36.4 °C) (01/21/23 1525)  Pulse: 92 (01/21/23 1525)  Resp: 18 (01/21/23 1525)  BP: (!) 107/57 (01/21/23 1525)  SpO2: 95 % (01/21/23 1525)   Vital Signs (24h Range):  Temp:  [97.1 °F (36.2 °C)-98.3 °F (36.8 °C)] 97.6 °F (36.4 °C)  Pulse:  [80-97] 92  Resp:  [16-22] 18  SpO2:  [92 %-99 %] 95 %  BP: ()/(52-69) 107/57     Weight: 70.3 kg (155 lb)  Body mass index is 25.02 kg/m².      Intake/Output Summary (Last 24 hours) at 1/21/2023 1631  Last data filed at 1/21/2023 0054  Gross per 24 hour   Intake 188.58 ml   Output 1300 ml   Net -1111.42 ml       Physical Exam  Vitals and nursing note reviewed.   Constitutional:       Appearance: She is obese.   HENT:      Head: Normocephalic.   Eyes:      Conjunctiva/sclera: Conjunctivae normal.   Cardiovascular:      Rate and Rhythm: Normal rate.   Pulmonary:      Effort: Pulmonary effort is normal.      Breath sounds: Wheezing present.   Abdominal:      Palpations: Abdomen is soft.   Musculoskeletal:         General: Normal range of motion.      Cervical back: Normal range of motion and neck supple.      Right lower leg: Edema present.      Left lower leg: Edema present.   Skin:     General: Skin is warm and dry.   Neurological:      General: No focal deficit present.      Mental Status: She is alert and oriented to person, place, and time.   Psychiatric:         Mood and Affect: Mood normal.     Review of Systems:  12-point ROS negative except as indicated in HPI  Vents:  Oxygen Concentration (%): 32 (01/21/23 1345)    Significant Labs:  CBC/Anemia Profile:  Recent Labs   Lab 01/20/23  0406 01/20/23  0540 01/21/23  0645   WBC 7.59 7.41 6.88   HGB 10.0* 10.2* 9.8*   HCT 31.7* 31.8* 30.9*    275 275   MCV 83 84 84   RDW 15.3* 15.3* 15.2*   Chemistries:  Recent Labs   Lab 01/19/23  2044 01/20/23  0406 01/20/23  0607 01/21/23  0645    141  --  140  140   K 3.4* 3.4*  --  3.5  3.5    106  --  104   104   CO2 23 26  --  26  26   BUN 21 18  --  20  20   CREATININE 1.0 0.8  --  0.9  0.9   CALCIUM 9.3 8.5*  --  8.7  8.5*   ALBUMIN 3.3* 2.9*  --  2.9*   PROT 7.0 6.0  --  6.1   BILITOT 0.7 0.6  --  1.1*   ALKPHOS 108 90  --  88   ALT 19 16  --  15   AST 20 18  --  16   MG  --   --  2.1  --

## 2023-01-21 NOTE — PROGRESS NOTES
[facility]  Pulmonology Progress Note    Patient Name: Violeta Scherer   MRN: [id number]  Admission Date: 1/19/2023   Hospital Length of Stay: 2 days  Code Status: Full Code    Attending Provider: Calvin Schrader MD    Principal Problem: New onset of congestive heart failure  Subjective:   History of Present Illness:   Violeta Scherer is a 72-year-old female with remote history of pulmonary embolus, hypertension, and prior stroke who is currently admitted to hospitalist service after presenting with progressively worsening shortness of breath for 3 days prior to hospital presentation. Some history was provided by her daughter, who was at the bedside. Reportedly, she had been experiencing worsening shortness of breath and orthopnea for the past few days with associated significantly worsening lower extremity edema which began on the day prior to hospital presentation.  No prior history of heart failure no formal diagnosis of COPD, but she did previously see Dr. Oneal and is prescribed a Trelegy inhaler. She does have a heavy smoking history, though quit more than 20 years ago. Denied complaints of  chest pain, fevers/chills, sick contacts, cough, sputum production, dizziness / lightheadedness.    Her workup while inpatient has showed NSTEMI and new onset heart failure. She is currently on heparin drip. She initially  received 20 mg of IV Lasix on 1/19 with no I/O's charted. She received an additional 40 mg of IV Lasix on 1/20.  Pulmonary and Critical Care were consulted due to increasing lethargy on the afternoon of 1/20.  At the time our initial exam on Jan 20th, her oxygenation was acceptable on 2-3 L via nasal cannula. Patient was drowsy, but easily arousable to voice and answered questions appropriately. Did appear to have some increased work of breathing. ABG was without evidence of hypercapnia.    Interval history, 1/21/2023:  - patient clinically improved this am  - currently on  2L/NC  Objective:     Vital Signs (Most Recent):  Temp: 97.6 °F (36.4 °C) (01/21/23 1525)  Pulse: 92 (01/21/23 1525)  Resp: 18 (01/21/23 1525)  BP: (!) 107/57 (01/21/23 1525)  SpO2: 95 % (01/21/23 1525)   Vital Signs (24h Range):  Temp:  [97.1 °F (36.2 °C)-98.3 °F (36.8 °C)] 97.6 °F (36.4 °C)  Pulse:  [80-97] 92  Resp:  [16-22] 18  SpO2:  [92 %-99 %] 95 %  BP: ()/(52-69) 107/57     Weight: 70.3 kg (155 lb)  Body mass index is 25.02 kg/m².      Intake/Output Summary (Last 24 hours) at 1/21/2023 1631  Last data filed at 1/21/2023 0054  Gross per 24 hour   Intake 188.58 ml   Output 1300 ml   Net -1111.42 ml       Physical Exam  Vitals and nursing note reviewed.   Constitutional:       Appearance: She is obese.   HENT:      Head: Normocephalic.   Eyes:      Conjunctiva/sclera: Conjunctivae normal.   Cardiovascular:      Rate and Rhythm: Normal rate.   Pulmonary:      Effort: Pulmonary effort is normal.      Breath sounds: Wheezing present.   Abdominal:      Palpations: Abdomen is soft.   Musculoskeletal:         General: Normal range of motion.      Cervical back: Normal range of motion and neck supple.      Right lower leg: Edema present.      Left lower leg: Edema present.   Skin:     General: Skin is warm and dry.   Neurological:      General: No focal deficit present.      Mental Status: She is alert and oriented to person, place, and time.   Psychiatric:         Mood and Affect: Mood normal.     Review of Systems:  12-point ROS negative except as indicated in HPI  Vents:  Oxygen Concentration (%): 32 (01/21/23 1345)    Significant Labs:  CBC/Anemia Profile:  Recent Labs   Lab 01/20/23  0406 01/20/23  0540 01/21/23  0645   WBC 7.59 7.41 6.88   HGB 10.0* 10.2* 9.8*   HCT 31.7* 31.8* 30.9*    275 275   MCV 83 84 84   RDW 15.3* 15.3* 15.2*   Chemistries:  Recent Labs   Lab 01/19/23  2044 01/20/23  0406 01/20/23  0607 01/21/23  0645    141  --  140  140   K 3.4* 3.4*  --  3.5  3.5    106  --   104  104   CO2 23 26  --  26  26   BUN 21 18  --  20  20   CREATININE 1.0 0.8  --  0.9  0.9   CALCIUM 9.3 8.5*  --  8.7  8.5*   ALBUMIN 3.3* 2.9*  --  2.9*   PROT 7.0 6.0  --  6.1   BILITOT 0.7 0.6  --  1.1*   ALKPHOS 108 90  --  88   ALT 19 16  --  15   AST 20 18  --  16   MG  --   --  2.1  --    ABG  Recent Labs   Lab 01/20/23  1553   PH 7.478*   PO2 61*   PCO2 38.4   HCO3 28.5*   BE 5     Assessment/Plan:     * New onset of congestive heart failure  Patient is identified as having acute systolic heart failure. CHF is currently uncontrolled as evidenced by orthopnea and rales / crackles on pulmonary exam. Last BNP 2511. Patient is on CHF Pathway.    Latest ECHO on January 20, 2023 performed and demonstrates:  · The left ventricle is moderately enlarged with concentric hypertrophy and severely decreased systolic function.  · The estimated ejection fraction is 25%.  · Grade II left ventricular diastolic dysfunction.  · There is severe left ventricular global hypokinesis.  · Normal right ventricular size with normal right ventricular systolic function.  · There is mild-to-moderate aortic valve stenosis.  · Aortic valve area is 0.88 cm2; peak velocity is 2.49 m/s; mean gradient is 17 mmHg.  · Moderate mitral regurgitation. Moderate tricuspid regurgitation.  · Elevated central venous pressure (15 mmHg).  · The estimated PA systolic pressure is 61 mmHg. There is pulmonary hypertension.  · Trivial circumferential pericardial effusion. Effusion is fluid.    - continue to stress to patient importance of self efficacy and  on diet for CHF  - continue Lasix IV daily   - continue telemetry and monitor clinical / hemodynamics status closely  - monitor strict I&Os with daily weights; continue fluid restriction of 1.5L / daily  - Cardiology following and planning ischemic evaluation when more stable  - continue respiratory support as above    Shortness of breath  - likely secondary to volume overload  - continue  Lasix IV daily  - wheezing likely secondary to pulmonary edema  - continue scheduled nebs  - continue Pulmicort and Brovana  - continue supplemental oxygen to target goal SpO2 > 88%  - Bipap as needed for pulmonary decline      Danette Kennedy NP  Pulmonary and Critical Care Medicine  [facility]

## 2023-01-21 NOTE — ASSESSMENT & PLAN NOTE
NSTEMI, inferior EKG changes and evidence of diffuse LV dysfunction by echo  Continue Heparin IV  ASA  Plan [facility] when able to lie flat  Now with CHF and edema

## 2023-01-21 NOTE — SUBJECTIVE & OBJECTIVE
Interval History: See hospital course for today      Review of Systems   Unable to perform ROS: Acuity of condition (daughter provides collateral)   Respiratory:  Positive for shortness of breath (improved).    Psychiatric/Behavioral:  Negative for sleep disturbance.    Objective:     Vital Signs (Most Recent):  Temp: 97.8 °F (36.6 °C) (01/21/23 0742)  Pulse: 93 (01/21/23 0910)  Resp: (!) 22 (01/21/23 0809)  BP: (!) 113/56 (01/21/23 0742)  SpO2: 95 % (01/21/23 0809)   Vital Signs (24h Range):  Temp:  [97.1 °F (36.2 °C)-98.3 °F (36.8 °C)] 97.8 °F (36.6 °C)  Pulse:  [] 93  Resp:  [16-22] 22  SpO2:  [92 %-99 %] 95 %  BP: ()/(52-81) 113/56     Weight: 70.3 kg (155 lb)  Body mass index is 25.02 kg/m².    Intake/Output Summary (Last 24 hours) at 1/21/2023 1040  Last data filed at 1/21/2023 0054  Gross per 24 hour   Intake 188.58 ml   Output 1300 ml   Net -1111.42 ml      Physical Exam  Vitals and nursing note reviewed. Exam conducted with a chaperone present (daughter).   Constitutional:       General: She is sleeping. She is not in acute distress.     Appearance: She is ill-appearing. She is not toxic-appearing.      Interventions: Nasal cannula in place.   HENT:      Head: Normocephalic and atraumatic.   Cardiovascular:      Rate and Rhythm: Normal rate.   Pulmonary:      Effort: Tachypnea and respiratory distress present.      Breath sounds: Decreased air movement present.   Abdominal:      General: There is distension.      Palpations: Abdomen is soft.      Tenderness: There is no abdominal tenderness.   Genitourinary:     Comments: sullivan  Musculoskeletal:      Right lower leg: Edema present.      Left lower leg: Edema present.   Skin:     General: Skin is warm and dry.      Coloration: Skin is pale.      Findings: Bruising present.   Neurological:      Mental Status: She is easily aroused. Mental status is at baseline. She is disoriented.   Psychiatric:         Behavior: Behavior is cooperative.        Significant Labs: All pertinent labs within the past 24 hours have been reviewed.  CBC:   Recent Labs   Lab 01/20/23  0406 01/20/23  0540 01/21/23  0645   WBC 7.59 7.41 6.88   HGB 10.0* 10.2* 9.8*   HCT 31.7* 31.8* 30.9*    275 275     CMP:   Recent Labs   Lab 01/19/23  2044 01/20/23  0406 01/21/23  0645    141 140  140   K 3.4* 3.4* 3.5  3.5    106 104  104   CO2 23 26 26  26   * 107 97  98   BUN 21 18 20  20   CREATININE 1.0 0.8 0.9  0.9   CALCIUM 9.3 8.5* 8.7  8.5*   PROT 7.0 6.0 6.1   ALBUMIN 3.3* 2.9* 2.9*   BILITOT 0.7 0.6 1.1*   ALKPHOS 108 90 88   AST 20 18 16   ALT 19 16 15   ANIONGAP 13 9 10  10     Cardiac Markers:   Recent Labs   Lab 01/19/23  2341   BNP 2,511*     Troponin:   Recent Labs   Lab 01/19/23 2044 01/19/23  2341 01/20/23  0607   TROPONINI 0.745* 0.758* 1.019*       Significant Imaging: I have reviewed all pertinent imaging results/findings within the past 24 hours.

## 2023-01-21 NOTE — PLAN OF CARE
Plan of care discussed with pt. Pt verbalized understanding. Free from injury. No s/s of distress noted. Vitals stable. IV SL. Meds as ordered. No c/o pain. 3L NC Diet tolerated. Tele monitor in place. Q2 hour rounding. No complaints. Will continue to monitor pt. 12 hour chart review.

## 2023-01-21 NOTE — HOSPITAL COURSE
1/21/23-Patient stable and plan cath tomorrow    1/22/23-Patient seen and examined in room post [facility]. Diffuse CAD and medical managemt. No chest pain.  Plan GDMT. Family aware and are in agreement.    1/23/23 Pt seen and examined today, s/p LHC, right groin site C/D/I palpable pulses. Denies CP at this time, breathing ok on NC. Labs reviewed stable    1/24/23 Pt seen and examined today. Feels good. LifeVest approved. Labs reviewed, stable. Added BB

## 2023-01-22 LAB
ALBUMIN SERPL BCP-MCNC: 3 G/DL (ref 3.5–5.2)
ALP SERPL-CCNC: 91 U/L (ref 55–135)
ALT SERPL W/O P-5'-P-CCNC: 13 U/L (ref 10–44)
ANION GAP SERPL CALC-SCNC: 11 MMOL/L (ref 8–16)
APTT BLDCRRT: 73.7 SEC (ref 21–32)
AST SERPL-CCNC: 12 U/L (ref 10–40)
BASOPHILS # BLD AUTO: 0.05 K/UL (ref 0–0.2)
BASOPHILS NFR BLD: 0.7 % (ref 0–1.9)
BILIRUB SERPL-MCNC: 1.1 MG/DL (ref 0.1–1)
BUN SERPL-MCNC: 23 MG/DL (ref 8–23)
CALCIUM SERPL-MCNC: 8.8 MG/DL (ref 8.7–10.5)
CATH EF QUANTITATIVE: 20 %
CHLORIDE SERPL-SCNC: 99 MMOL/L (ref 95–110)
CO2 SERPL-SCNC: 27 MMOL/L (ref 23–29)
CREAT SERPL-MCNC: 1 MG/DL (ref 0.5–1.4)
DIFFERENTIAL METHOD: ABNORMAL
EOSINOPHIL # BLD AUTO: 0.1 K/UL (ref 0–0.5)
EOSINOPHIL NFR BLD: 1.5 % (ref 0–8)
ERYTHROCYTE [DISTWIDTH] IN BLOOD BY AUTOMATED COUNT: 15.3 % (ref 11.5–14.5)
EST. GFR  (NO RACE VARIABLE): >60 ML/MIN/1.73 M^2
GLUCOSE SERPL-MCNC: 100 MG/DL (ref 70–110)
HCT VFR BLD AUTO: 31.1 % (ref 37–48.5)
HGB BLD-MCNC: 9.7 G/DL (ref 12–16)
IMM GRANULOCYTES # BLD AUTO: 0.02 K/UL (ref 0–0.04)
IMM GRANULOCYTES NFR BLD AUTO: 0.3 % (ref 0–0.5)
LYMPHOCYTES # BLD AUTO: 1.5 K/UL (ref 1–4.8)
LYMPHOCYTES NFR BLD: 20.2 % (ref 18–48)
MCH RBC QN AUTO: 26.2 PG (ref 27–31)
MCHC RBC AUTO-ENTMCNC: 31.2 G/DL (ref 32–36)
MCV RBC AUTO: 84 FL (ref 82–98)
MONOCYTES # BLD AUTO: 0.7 K/UL (ref 0.3–1)
MONOCYTES NFR BLD: 9.1 % (ref 4–15)
NEUTROPHILS # BLD AUTO: 5 K/UL (ref 1.8–7.7)
NEUTROPHILS NFR BLD: 68.2 % (ref 38–73)
NRBC BLD-RTO: 0 /100 WBC
PLATELET # BLD AUTO: 274 K/UL (ref 150–450)
PMV BLD AUTO: 10.3 FL (ref 9.2–12.9)
POCT GLUCOSE: 140 MG/DL (ref 70–110)
POCT GLUCOSE: 154 MG/DL (ref 70–110)
POCT GLUCOSE: 95 MG/DL (ref 70–110)
POTASSIUM SERPL-SCNC: 3.8 MMOL/L (ref 3.5–5.1)
PROT SERPL-MCNC: 6.1 G/DL (ref 6–8.4)
RBC # BLD AUTO: 3.7 M/UL (ref 4–5.4)
SODIUM SERPL-SCNC: 137 MMOL/L (ref 136–145)
WBC # BLD AUTO: 7.36 K/UL (ref 3.9–12.7)

## 2023-01-22 PROCEDURE — 25000003 PHARM REV CODE 250: Performed by: HOSPITALIST

## 2023-01-22 PROCEDURE — 80053 COMPREHEN METABOLIC PANEL: CPT | Performed by: HOSPITALIST

## 2023-01-22 PROCEDURE — 99152 PR MOD CONSCIOUS SEDATION, SAME PHYS, 5+ YRS, FIRST 15 MIN: ICD-10-PCS | Mod: ,,, | Performed by: INTERNAL MEDICINE

## 2023-01-22 PROCEDURE — 63600175 PHARM REV CODE 636 W HCPCS: Performed by: INTERNAL MEDICINE

## 2023-01-22 PROCEDURE — 25000003 PHARM REV CODE 250: Performed by: INTERNAL MEDICINE

## 2023-01-22 PROCEDURE — 27201423 OPTIME MED/SURG SUP & DEVICES STERILE SUPPLY: Performed by: INTERNAL MEDICINE

## 2023-01-22 PROCEDURE — 36415 COLL VENOUS BLD VENIPUNCTURE: CPT | Performed by: FAMILY MEDICINE

## 2023-01-22 PROCEDURE — 99232 SBSQ HOSP IP/OBS MODERATE 35: CPT | Mod: 25,,, | Performed by: INTERNAL MEDICINE

## 2023-01-22 PROCEDURE — 27000221 HC OXYGEN, UP TO 24 HOURS

## 2023-01-22 PROCEDURE — 85025 COMPLETE CBC W/AUTO DIFF WBC: CPT | Performed by: FAMILY MEDICINE

## 2023-01-22 PROCEDURE — 25000242 PHARM REV CODE 250 ALT 637 W/ HCPCS: Performed by: HOSPITALIST

## 2023-01-22 PROCEDURE — C1769 GUIDE WIRE: HCPCS | Performed by: INTERNAL MEDICINE

## 2023-01-22 PROCEDURE — 93458 L HRT ARTERY/VENTRICLE ANGIO: CPT | Mod: 26,,, | Performed by: INTERNAL MEDICINE

## 2023-01-22 PROCEDURE — 25000242 PHARM REV CODE 250 ALT 637 W/ HCPCS: Performed by: INTERNAL MEDICINE

## 2023-01-22 PROCEDURE — 94640 AIRWAY INHALATION TREATMENT: CPT

## 2023-01-22 PROCEDURE — 99153 MOD SED SAME PHYS/QHP EA: CPT | Performed by: INTERNAL MEDICINE

## 2023-01-22 PROCEDURE — 25000003 PHARM REV CODE 250: Performed by: FAMILY MEDICINE

## 2023-01-22 PROCEDURE — 99900035 HC TECH TIME PER 15 MIN (STAT)

## 2023-01-22 PROCEDURE — 99232 PR SUBSEQUENT HOSPITAL CARE,LEVL II: ICD-10-PCS | Mod: 25,,, | Performed by: INTERNAL MEDICINE

## 2023-01-22 PROCEDURE — 99233 SBSQ HOSP IP/OBS HIGH 50: CPT | Mod: ,,, | Performed by: INTERNAL MEDICINE

## 2023-01-22 PROCEDURE — 11000001 HC ACUTE MED/SURG PRIVATE ROOM

## 2023-01-22 PROCEDURE — 94761 N-INVAS EAR/PLS OXIMETRY MLT: CPT

## 2023-01-22 PROCEDURE — C1894 INTRO/SHEATH, NON-LASER: HCPCS | Performed by: INTERNAL MEDICINE

## 2023-01-22 PROCEDURE — 63600175 PHARM REV CODE 636 W HCPCS: Performed by: FAMILY MEDICINE

## 2023-01-22 PROCEDURE — 85730 THROMBOPLASTIN TIME PARTIAL: CPT | Mod: ER | Performed by: HOSPITALIST

## 2023-01-22 PROCEDURE — 93458 PR CATH PLACE/CORON ANGIO, IMG SUPER/INTERP,W LEFT HEART VENTRICULOGRAPHY: ICD-10-PCS | Mod: 26,,, | Performed by: INTERNAL MEDICINE

## 2023-01-22 PROCEDURE — 25500020 PHARM REV CODE 255: Performed by: INTERNAL MEDICINE

## 2023-01-22 PROCEDURE — 99233 PR SUBSEQUENT HOSPITAL CARE,LEVL III: ICD-10-PCS | Mod: ,,, | Performed by: INTERNAL MEDICINE

## 2023-01-22 PROCEDURE — 99152 MOD SED SAME PHYS/QHP 5/>YRS: CPT | Mod: ,,, | Performed by: INTERNAL MEDICINE

## 2023-01-22 PROCEDURE — 99152 MOD SED SAME PHYS/QHP 5/>YRS: CPT | Performed by: INTERNAL MEDICINE

## 2023-01-22 PROCEDURE — 93458 L HRT ARTERY/VENTRICLE ANGIO: CPT | Performed by: INTERNAL MEDICINE

## 2023-01-22 RX ORDER — NITROGLYCERIN 0.4 MG/1
0.4 TABLET SUBLINGUAL EVERY 5 MIN PRN
Status: DISCONTINUED | OUTPATIENT
Start: 2023-01-22 | End: 2023-01-24 | Stop reason: HOSPADM

## 2023-01-22 RX ORDER — DIPHENHYDRAMINE HCL 25 MG
50 CAPSULE ORAL ONCE
Status: COMPLETED | OUTPATIENT
Start: 2023-01-22 | End: 2023-01-22

## 2023-01-22 RX ORDER — LOSARTAN POTASSIUM 25 MG/1
25 TABLET ORAL DAILY
Status: DISCONTINUED | OUTPATIENT
Start: 2023-01-22 | End: 2023-01-24

## 2023-01-22 RX ORDER — HYDROCODONE BITARTRATE AND ACETAMINOPHEN 5; 325 MG/1; MG/1
1 TABLET ORAL EVERY 4 HOURS PRN
Status: DISCONTINUED | OUTPATIENT
Start: 2023-01-22 | End: 2023-01-24 | Stop reason: HOSPADM

## 2023-01-22 RX ORDER — LIDOCAINE HYDROCHLORIDE 20 MG/ML
INJECTION, SOLUTION EPIDURAL; INFILTRATION; INTRACAUDAL; PERINEURAL
Status: DISCONTINUED | OUTPATIENT
Start: 2023-01-22 | End: 2023-01-22 | Stop reason: HOSPADM

## 2023-01-22 RX ORDER — SODIUM CHLORIDE 0.9 % (FLUSH) 0.9 %
10 SYRINGE (ML) INJECTION
Status: DISCONTINUED | OUTPATIENT
Start: 2023-01-22 | End: 2023-01-24 | Stop reason: HOSPADM

## 2023-01-22 RX ORDER — OXYCODONE HYDROCHLORIDE 5 MG/1
10 TABLET ORAL EVERY 4 HOURS PRN
Status: DISCONTINUED | OUTPATIENT
Start: 2023-01-22 | End: 2023-01-24 | Stop reason: HOSPADM

## 2023-01-22 RX ORDER — ACETAMINOPHEN 325 MG/1
650 TABLET ORAL EVERY 8 HOURS PRN
Status: DISCONTINUED | OUTPATIENT
Start: 2023-01-22 | End: 2023-01-24 | Stop reason: HOSPADM

## 2023-01-22 RX ORDER — ATROPINE SULFATE 0.1 MG/ML
0.5 INJECTION INTRAVENOUS
Status: DISCONTINUED | OUTPATIENT
Start: 2023-01-22 | End: 2023-01-24 | Stop reason: HOSPADM

## 2023-01-22 RX ORDER — ONDANSETRON 8 MG/1
8 TABLET, ORALLY DISINTEGRATING ORAL EVERY 8 HOURS PRN
Status: DISCONTINUED | OUTPATIENT
Start: 2023-01-22 | End: 2023-01-24 | Stop reason: HOSPADM

## 2023-01-22 RX ORDER — SODIUM CHLORIDE 9 MG/ML
INJECTION, SOLUTION INTRAVENOUS CONTINUOUS
Status: ACTIVE | OUTPATIENT
Start: 2023-01-22 | End: 2023-01-22

## 2023-01-22 RX ORDER — ACETAMINOPHEN 325 MG/1
650 TABLET ORAL EVERY 4 HOURS PRN
Status: DISCONTINUED | OUTPATIENT
Start: 2023-01-22 | End: 2023-01-24 | Stop reason: HOSPADM

## 2023-01-22 RX ORDER — FENTANYL CITRATE 50 UG/ML
INJECTION, SOLUTION INTRAMUSCULAR; INTRAVENOUS
Status: DISCONTINUED | OUTPATIENT
Start: 2023-01-22 | End: 2023-01-22 | Stop reason: HOSPADM

## 2023-01-22 RX ADMIN — IPRATROPIUM BROMIDE AND ALBUTEROL SULFATE 3 ML: 2.5; .5 SOLUTION RESPIRATORY (INHALATION) at 09:01

## 2023-01-22 RX ADMIN — APIXABAN 2.5 MG: 2.5 TABLET, FILM COATED ORAL at 09:01

## 2023-01-22 RX ADMIN — ARFORMOTEROL TARTRATE 15 MCG: 15 SOLUTION RESPIRATORY (INHALATION) at 09:01

## 2023-01-22 RX ADMIN — BUDESONIDE 0.5 MG: 0.5 INHALANT ORAL at 07:01

## 2023-01-22 RX ADMIN — IPRATROPIUM BROMIDE AND ALBUTEROL SULFATE 3 ML: 2.5; .5 SOLUTION RESPIRATORY (INHALATION) at 07:01

## 2023-01-22 RX ADMIN — FUROSEMIDE 40 MG: 10 INJECTION, SOLUTION INTRAMUSCULAR; INTRAVENOUS at 10:01

## 2023-01-22 RX ADMIN — ARFORMOTEROL TARTRATE 15 MCG: 15 SOLUTION RESPIRATORY (INHALATION) at 07:01

## 2023-01-22 RX ADMIN — SODIUM CHLORIDE: 9 INJECTION, SOLUTION INTRAVENOUS at 10:01

## 2023-01-22 RX ADMIN — DIPHENHYDRAMINE HYDROCHLORIDE 50 MG: 25 CAPSULE ORAL at 06:01

## 2023-01-22 RX ADMIN — PANTOPRAZOLE SODIUM 40 MG: 40 TABLET, DELAYED RELEASE ORAL at 09:01

## 2023-01-22 RX ADMIN — IPRATROPIUM BROMIDE AND ALBUTEROL SULFATE 3 ML: 2.5; .5 SOLUTION RESPIRATORY (INHALATION) at 12:01

## 2023-01-22 RX ADMIN — BUDESONIDE 0.5 MG: 0.5 INHALANT ORAL at 09:01

## 2023-01-22 NOTE — PLAN OF CARE
Pt remains free from falls/injuries this shift. Safety precautions maintained. Heparin gtt infusing at 12units/kg/hr. 2L nasal cannula. Chart check completed.   Problem: Adult Inpatient Plan of Care  Goal: Plan of Care Review  Outcome: Ongoing, Progressing  Goal: Patient-Specific Goal (Individualized)  Outcome: Ongoing, Progressing  Goal: Absence of Hospital-Acquired Illness or Injury  Outcome: Ongoing, Progressing  Goal: Optimal Comfort and Wellbeing  Outcome: Ongoing, Progressing  Goal: Readiness for Transition of Care  Outcome: Ongoing, Progressing     Problem: Fall Injury Risk  Goal: Absence of Fall and Fall-Related Injury  Outcome: Ongoing, Progressing

## 2023-01-22 NOTE — ASSESSMENT & PLAN NOTE
See CHF  Unable to lie flat at this time    1/21/23  Stable and able to lie flat today    1/22/23  Post cath stable, add losartan

## 2023-01-22 NOTE — ASSESSMENT & PLAN NOTE
NSTEMI, inferior EKG changes and evidence of diffuse LV dysfunction by echo  Continue Heparin IV  ASA  Plan [facility] when able to lie flat  Now with CHF and edema

## 2023-01-22 NOTE — PROGRESS NOTES
[facility]  Progress Note    Patient Name: Violeta Scherer  MRN: [id number]  Patient Class: IP- Inpatient   Admission Date: 1/19/2023  Length of Stay: 3 days  Attending Physician: Calvin Schrader MD  Primary Care Provider: Karrie Birmingham MD        Subjective:     Principal Problem:New onset of congestive heart failure        HPI:  Violeta Scherer is a 72 y.o. female with a PMH  has a past medical history of Hypertension, Other pulmonary embolism without acute cor pulmonale, and Stroke. who presented to the ED for further evaluation of progressive worsening shortness of breath x2 days duration.  Patient reportedly had acute onset and progressively worsening shortness of breath over the past 3 days with patient now reporting 3-4 pillow orthopnea, PND, dyspnea on minimal exertion, and bilateral lower extremity pitting edema.  Patient denies history of heart failure, COPD, of prior MIs but did have history of previous stroke/TIA and PE's.  Aggravating factors included those noted above with no known alleviating factors.  All other review of systems negative including for lightheadedness, dizziness, headache, visual changes, fever, chills, sweats, nausea, vomiting, chest pain, abdominal pain, dysuria, hematuria, melena, hematochezia, constipation, diarrhea, or onset neurological deficits.  Prior to onset of symptoms, patient reported being in her usual state health no other concerns or complaints.  Initial workup in the ED revealed patient to have evidence consistent with signs/symptoms of new onset CHF and NSTEMI.  Hospital Medicine consulted by ED staff for admission for continued medical management.      PCP: Karrie Birmingham        Overview/Hospital Course:  1/20 admitted for new onset congestive heart failure exacerbation. Echocardiogram with ejection fraction 25%. Cardiology consulted and diuresing. Family endorses orthopnea, increased lower extremity edema. Daughter reports patient did not  want to come to hospital. Attempted goals of care discussion but patient unable to participate. Critical care consult. Abg pending  1/21 abg within normal limits. Continue monitoring need for nippv. Diuresing well with intravenous lasix. Tachypnea improving. Disoriented at baseline per daughter. Daughter states baclofen causing drowsiness  1/22 status post [facility]. Tolerated well. Remains dyspneic. Continue diuresing. Optimize medication(s) and breathing with diuretics, nitroglycerin, oxygen evaluation. Anticipate discharge 1-2 days.      Interval History: See hospital course for today      Review of Systems   Unable to perform ROS: Acuity of condition   Objective:     Vital Signs (Most Recent):  Temp: 97 °F (36.1 °C) (01/22/23 1101)  Pulse: 98 (01/22/23 1101)  Resp: (!) 21 (01/22/23 1101)  BP: 129/67 (01/22/23 1101)  SpO2: (!) 94 % (01/22/23 1101)   Vital Signs (24h Range):  Temp:  [97 °F (36.1 °C)-98.2 °F (36.8 °C)] 97 °F (36.1 °C)  Pulse:  [] 98  Resp:  [17-21] 21  SpO2:  [90 %-97 %] 94 %  BP: (103-129)/(53-67) 129/67     Weight: 71.2 kg (156 lb 15.5 oz)  Body mass index is 25.34 kg/m².    Intake/Output Summary (Last 24 hours) at 1/22/2023 1105  Last data filed at 1/22/2023 0336  Gross per 24 hour   Intake 284.25 ml   Output --   Net 284.25 ml      Physical Exam  Vitals and nursing note reviewed. Exam conducted with a chaperone present (cath lab).   Constitutional:       General: She is sleeping. She is not in acute distress.     Appearance: She is ill-appearing. She is not toxic-appearing.      Interventions: Nasal cannula in place.   HENT:      Head: Normocephalic and atraumatic.   Cardiovascular:      Rate and Rhythm: Normal rate.   Pulmonary:      Effort: Tachypnea and respiratory distress present.      Breath sounds: Decreased air movement present.   Abdominal:      Palpations: Abdomen is soft.      Tenderness: There is no abdominal tenderness.   Genitourinary:     Comments: sullivan  Musculoskeletal:       Right lower leg: No edema.      Left lower leg: No edema.   Skin:     General: Skin is warm and dry.      Capillary Refill: Capillary refill takes 2 to 3 seconds.      Coloration: Skin is pale.   Neurological:      Mental Status: She is easily aroused. Mental status is at baseline. She is lethargic and disoriented.      Motor: Weakness present.   Psychiatric:         Behavior: Behavior is cooperative.       Significant Labs: All pertinent labs within the past 24 hours have been reviewed.  ABGs:   Recent Labs   Lab 01/20/23  1553   PH 7.478*   PCO2 38.4   HCO3 28.5*   POCSATURATED 93*   BE 5   PO2 61*     CBC:   Recent Labs   Lab 01/21/23  0645 01/22/23  0553   WBC 6.88 7.36   HGB 9.8* 9.7*   HCT 30.9* 31.1*    274     CMP:   Recent Labs   Lab 01/21/23  0645 01/22/23  0552     140 137   K 3.5  3.5 3.8     104 99   CO2 26  26 27   GLU 97  98 100   BUN 20  20 23   CREATININE 0.9  0.9 1.0   CALCIUM 8.7  8.5* 8.8   PROT 6.1 6.1   ALBUMIN 2.9* 3.0*   BILITOT 1.1* 1.1*   ALKPHOS 88 91   AST 16 12   ALT 15 13   ANIONGAP 10  10 11       Coagulation:   Recent Labs   Lab 01/22/23  0553   APTT 73.7*         Significant Imaging: I have reviewed all pertinent imaging results/findings within the past 24 hours.      Assessment/Plan:      * New onset of congestive heart failure  Patient is identified as having heart failure that is new onset. CHF is currently uncontrolled due to Continued edema of extremities, >3 pillow orthopnea, Rales/crackles on pulmonary exam and Pulmonary edema/pleural effusion on CXR. Latest ECHO performed and demonstrates- No results found for this or any previous visit.  . Continue Beta Blocker and Furosemide and monitor clinical status closely. Monitor on telemetry. Patient is on CHF pathway.  Monitor strict Is&Os and daily weights.  Place on fluid restriction of 1.5 L. Continue to stress to patient importance of self efficacy and  on diet for CHF. Last BNP reviewed- and  noted below   Recent Labs   Lab 01/19/23  2341   BNP 2,511*     Intravenous diuresising  Cardiology consulted  Critical care consult for higher level of care  abg pending  If hypercapnic, transfer to icu for nippv    1/21  Awaiting euvolemic/compensated for lhc    1/22  Continue diuresing  Ambulatory oxygen evaluation closer to discharge       GERD (gastroesophageal reflux disease)  .Chronic. Stable. Currently asymptomatic. Home medications include PPI/Antacids as needed.  Plan:  -Continue PPI/Antacids as needed       Depression  Chronic. Stable. Not in acute exacerbation and currently denies endorsing any suicidal/homicidal ideations.   Plan:  -Continue home medications       HLD (hyperlipidemia)  Patient is chronically on statin.will continue for now. Last Lipid Panel:   Lab Results   Component Value Date    CHOL 132 01/20/2023    HDL 45 01/20/2023    LDLCALC 75.8 01/20/2023    TRIG 56 01/20/2023    CHOLHDL 34.1 01/20/2023     Plan:  -Continue home medication  Lipid panel within normal limits         NSTEMI (non-ST elevated myocardial infarction)  Ischemic evaluation or lhc per cardiology when more compensated     Status post lhc with diffuse disease  Optimize medication(s) and discharge home with follow up cardiology     Elevated troponin  Patient is doing found to have elevated troponin measuring 0.745 with repeat 0.758.  EKG shows sinus tachycardia with T-wave inversions in inferior and anterior lateral leads. Patient without evidence of chest pain does show cardiomyopathy with presence of new onset heart failure.  May be secondary to demand ischemia but treating as NSTEMI in initiated on heparin.  Cardiology consulted by ED staff and aware of patient and awaiting further evaluation in the morning.  Recommended administering aspirin, statin, and beta-blocker which was provided with the exception of beta-blocker due to possible new onset heart failure and provided     1/21  Cardiology following  On heparin  infusion  Planning for ischemic evaluation or lhc when able    Status post [facility] with diffuse disease  Optimize medical management and follow up outpatient cardiology     Shortness of breath  Patient presented with worsening dyspnea/shortness of breath as noted above likely secondary to new onset CHF.  Patient currently saturating % on room air.  Respiratory rate ranging from 17-26.  She remains afebrile without leukocytosis with a/HI baseline currently measuring 10.8/34.6.  BNP elevated at 1940 with chest x-ray revealing mild cardiomegaly with mild perihilar interstitial opacity and this likely trace pleural effusions or pleural thickening.  D-dimer not obtained in the ED.  History of PE in the past but not severely hypoxic or tachycardic with tachypnea improving following diuresing.  Patient provided 20 mg IV Lasix x1.  Cardiology consulted and following patient.  Plan:  See congestive heart failure plan      History of pulmonary embolus (PE)  On heparin gtt     Status post [facility]  Discontinue heparin gtt  Resume home a/c    History of CVA (cerebrovascular accident)  Patient with history remote CVA and PE proximally 4 years prior to admission previously treated with Eliquis.  Patient does present with some concerns of recurrent PE but has likely cause of shortness a breath, tachypnea, and hypoxia given CHF exacerbation.  We will obtain D-dimer as it was not obtained in the ED and consider CTA for definitive rule out of PE given history and presentation.  Currently on heparin drip for treatment of NSTEMI with cardiology following and awaiting further evaluation/recommendations.  Plan:  Does not wear supplemental oxygen at baseline  -reat CHF exacerbation and NSTEMI as noted above  elevated d-dimer  vq scan low prob pulmonary embolism     1/22  Disoriented at baseline  Consider homehealth physical/occupational therapy upon discharge given underlying cognitive impairment      Primary hypertension  Controlled  No  antihtn medication(s) on home medication(s) list  Losartan started   Intravenous diuresis  IV hydralazine prn for SBP>160 or DBP>90       1/21  Hypotension  Discontinue losartan  Reduce lasix dose    1/22  Continue monitoring need for antihtn medication(s) while diuresing     VTE Risk Mitigation (From admission, onward)         Ordered     apixaban tablet 2.5 mg  2 times daily         01/22/23 1108     Reason for No Pharmacological VTE Prophylaxis  Once        Question:  Reasons:  Answer:  Physician Provided (leave comment)  Comment:  on heparin drip    01/19/23 2246     IP VTE HIGH RISK PATIENT  Once         01/19/23 2246     Place sequential compression device  Until discontinued         01/19/23 2246                Discharge Planning   SHIVANI:      Code Status: Full Code   Is the patient medically ready for discharge?:     Reason for patient still in hospital (select all that apply): Patient trending condition, Laboratory test, Treatment, Imaging, Consult recommendations and Pending disposition  Discharge Plan A: Home with family                  Calvin Schrader MD  Department of Hospital Medicine   [facility] - Med S[facility]

## 2023-01-22 NOTE — ASSESSMENT & PLAN NOTE
Patient is identified as having heart failure that is new onset. CHF is currently uncontrolled due to Continued edema of extremities, >3 pillow orthopnea, Rales/crackles on pulmonary exam and Pulmonary edema/pleural effusion on CXR. Latest ECHO performed and demonstrates- No results found for this or any previous visit.  . Continue Beta Blocker and Furosemide and monitor clinical status closely. Monitor on telemetry. Patient is on CHF pathway.  Monitor strict Is&Os and daily weights.  Place on fluid restriction of 1.5 L. Continue to stress to patient importance of self efficacy and  on diet for CHF. Last BNP reviewed- and noted below   Recent Labs   Lab 01/19/23  2341   BNP 2,511*     Intravenous diuresising  Cardiology consulted  Critical care consult for higher level of care  abg pending  If hypercapnic, transfer to icu for nippv    1/21  Awaiting euvolemic/compensated for lhc    1/22  Continue diuresing  Ambulatory oxygen evaluation closer to discharge

## 2023-01-22 NOTE — ASSESSMENT & PLAN NOTE
Patient is identified as having Systolic (HFrEF) heart failure that is Acute. CHF is currently uncontrolled due to Continued edema of extremities. Latest ECHO performed and demonstrates- Results for orders placed during the hospital encounter of 01/19/23    Echo    Interpretation Summary  · The left ventricle is moderately enlarged with concentric hypertrophy and severely decreased systolic function.  · The estimated ejection fraction is 25%.  · Grade II left ventricular diastolic dysfunction.  · There is severe left ventricular global hypokinesis.  · Normal right ventricular size with normal right ventricular systolic function.  · There is mild-to-moderate aortic valve stenosis.  · Aortic valve area is 0.88 cm2; peak velocity is 2.49 m/s; mean gradient is 17 mmHg.  · Moderate mitral regurgitation.  · Moderate tricuspid regurgitation.  · Elevated central venous pressure (15 mmHg).  · The estimated PA systolic pressure is 61 mmHg.  · There is pulmonary hypertension.  · Trivial circumferential pericardial effusion. Effusion is fluid.  . Continue Furosemide and monitor clinical status closely. Monitor on telemetry. Patient is on CHF pathway.  Monitor strict Is&Os and daily weights.  Place on fluid restriction of 2 L. Continue to stress to patient importance of self efficacy and  on diet for CHF. Last BNP reviewed- and noted below   Recent Labs   Lab 01/19/23  2341   BNP 2,511*   .  Plan continued diuresis and plan [facility]    1/22/13- add losartan

## 2023-01-22 NOTE — ASSESSMENT & PLAN NOTE
Patient is doing found to have elevated troponin measuring 0.745 with repeat 0.758.  EKG shows sinus tachycardia with T-wave inversions in inferior and anterior lateral leads. Patient without evidence of chest pain does show cardiomyopathy with presence of new onset heart failure.  May be secondary to demand ischemia but treating as NSTEMI in initiated on heparin.  Cardiology consulted by ED staff and aware of patient and awaiting further evaluation in the morning.  Recommended administering aspirin, statin, and beta-blocker which was provided with the exception of beta-blocker due to possible new onset heart failure and provided     1/21  Cardiology following  On heparin infusion  Planning for ischemic evaluation or lhc when able    Status post lhc with diffuse disease  Optimize medical management and follow up outpatient cardiology

## 2023-01-22 NOTE — PROGRESS NOTES
[facility]  Cardiology  Progress Note    Patient Name: Violeta Scherer  MRN: [id number]  Admission Date: 1/19/2023  Hospital Length of Stay: 3 days  Code Status: Full Code   Attending Physician: Calvin Schrader MD   Primary Care Physician: Karrei Birmingham MD  Expected Discharge Date:   Principal Problem:New onset of congestive heart failure    Subjective:     Hospital Course:   1/21/23-Patient stable and plan cath tomorrow    1/22/23-Patient seen and examined in room post [facility]. Diffuse CAD and medical managemt. No chest pain.  Plan GDMT. Family aware and are in agreement.      Interval History: diffuse CAD and medical management.    Review of Systems   Constitutional: Negative for chills, diaphoresis, night sweats, weight gain and weight loss.   HENT:  Negative for congestion, hoarse voice, sore throat and stridor.    Eyes:  Negative for double vision and pain.   Cardiovascular:  Negative for chest pain, claudication, cyanosis, dyspnea on exertion, irregular heartbeat, leg swelling, near-syncope, orthopnea, palpitations, paroxysmal nocturnal dyspnea and syncope.   Respiratory:  Negative for cough, hemoptysis, shortness of breath, sleep disturbances due to breathing, snoring, sputum production and wheezing.    Endocrine: Negative for cold intolerance, heat intolerance and polydipsia.   Hematologic/Lymphatic: Negative for bleeding problem. Does not bruise/bleed easily.   Skin:  Negative for color change, dry skin and rash.   Musculoskeletal:  Negative for joint swelling and muscle cramps.   Gastrointestinal:  Negative for bloating, abdominal pain, constipation, diarrhea, dysphagia, melena, nausea and vomiting.   Genitourinary:  Negative for flank pain and urgency.   Neurological:  Negative for dizziness, focal weakness, headaches, light-headedness, loss of balance, seizures and weakness.   Psychiatric/Behavioral:  Negative for altered mental status and memory loss. The patient is not nervous/anxious.    Objective:      Vital Signs (Most Recent):  Temp: 97 °F (36.1 °C) (01/22/23 1101)  Pulse: 98 (01/22/23 1101)  Resp: (!) 21 (01/22/23 1101)  BP: 129/67 (01/22/23 1101)  SpO2: (!) 94 % (01/22/23 1101)   Vital Signs (24h Range):  Temp:  [97 °F (36.1 °C)-98.2 °F (36.8 °C)] 97 °F (36.1 °C)  Pulse:  [] 98  Resp:  [17-21] 21  SpO2:  [90 %-97 %] 94 %  BP: (107-129)/(56-67) 129/67     Weight: 71.2 kg (156 lb 15.5 oz)  Body mass index is 25.34 kg/m².     SpO2: (!) 94 %         Intake/Output Summary (Last 24 hours) at 1/22/2023 1207  Last data filed at 1/22/2023 0336  Gross per 24 hour   Intake 284.25 ml   Output --   Net 284.25 ml       Lines/Drains/Airways       Drain  Duration             Female External Urinary Catheter 01/21/23 1900 <1 day              Peripheral Intravenous Line  Duration                  Peripheral IV - Single Lumen 01/19/23 2038 18 G Right Antecubital 2 days         Peripheral IV - Single Lumen 01/19/23 2044 18 G Left Antecubital 2 days                    Physical Exam  Eyes:      Pupils: Pupils are equal, round, and reactive to light.   Neck:      Trachea: No tracheal deviation.   Cardiovascular:      Rate and Rhythm: Normal rate and regular rhythm.      Pulses: Intact distal pulses.           Carotid pulses are 2+ on the right side and 2+ on the left side.       Radial pulses are 2+ on the right side and 2+ on the left side.        Femoral pulses are 2+ on the right side and 2+ on the left side.       Popliteal pulses are 2+ on the right side and 2+ on the left side.        Dorsalis pedis pulses are 2+ on the right side and 2+ on the left side.        Posterior tibial pulses are 2+ on the right side and 2+ on the left side.      Heart sounds: Normal heart sounds. No murmur heard.    No friction rub. No gallop.   Pulmonary:      Effort: Pulmonary effort is normal. No respiratory distress.      Breath sounds: Normal breath sounds. No stridor. No wheezing or rales.   Chest:      Chest wall: No tenderness.    Abdominal:      General: There is no distension.      Tenderness: There is no abdominal tenderness. There is no rebound.   Musculoskeletal:         General: No tenderness.      Cervical back: Normal range of motion.   Skin:     General: Skin is warm and dry.   Neurological:      Mental Status: She is alert and oriented to person, place, and time.       Significant Labs: CMP   Recent Labs   Lab 01/21/23  0645 01/22/23  0552     140 137   K 3.5  3.5 3.8     104 99   CO2 26  26 27   GLU 97  98 100   BUN 20  20 23   CREATININE 0.9  0.9 1.0   CALCIUM 8.7  8.5* 8.8   PROT 6.1 6.1   ALBUMIN 2.9* 3.0*   BILITOT 1.1* 1.1*   ALKPHOS 88 91   AST 16 12   ALT 15 13   ANIONGAP 10  10 11    and Troponin No results for input(s): TROPONINI in the last 48 hours.    Significant Imaging: Echocardiogram: Transthoracic echo (TTE) complete (Cupid Only):   Results for orders placed or performed during the hospital encounter of 01/19/23   Echo   Result Value Ref Range    BSA 1.81 m2    TDI SEPTAL 0.09 m/s    LV LATERAL E/E' RATIO 15.75 m/s    LV SEPTAL E/E' RATIO 14.00 m/s    LA WIDTH 3.80 cm    IVC diameter 1.69 cm    Left Ventricular Outflow Tract Mean Velocity 0.63 cm/s    Left Ventricular Outflow Tract Mean Gradient 1.70 mmHg    TDI LATERAL 0.08 m/s    LVIDd 5.08 3.5 - 6.0 cm    IVS 1.51 (A) 0.6 - 1.1 cm    Posterior Wall 1.40 (A) 0.6 - 1.1 cm    Ao root annulus 2.46 cm    LVIDs 4.49 (A) 2.1 - 4.0 cm    FS 12 28 - 44 %    LA volume 47.84 cm3    Sinus 2.41 cm    STJ 2.53 cm    Ascending aorta 2.70 cm    LV mass 315.92 g    LA size 3.52 cm    TAPSE 1.78 cm    Left Ventricle Relative Wall Thickness 0.55 cm    AV mean gradient 17 mmHg    AV valve area 0.88 cm2    AV Velocity Ratio 0.30     AV index (prosthetic) 0.37     MV valve area p 1/2 method 3.23 cm2    E/A ratio 1.19     Mean e' 0.09 m/s    E wave deceleration time 234.50 msec    IVRT 62.80 msec    LVOT diameter 1.74 cm    LVOT area 2.4 cm2    LVOT peak aleksandr 0.74  m/s    LVOT peak VTI 17.50 cm    Ao peak sarmad 2.49 m/s    Ao VTI 47.1 cm    RVOT peak sarmad 0.82 m/s    RVOT peak VTI 16.4 cm    Mr max sarmad 5.20 m/s    LVOT stroke volume 41.59 cm3    AV peak gradient 25 mmHg    PV mean gradient 2.05 mmHg    E/E' ratio 14.82 m/s    MV Peak E Sarmad 1.26 m/s    TR Max Sarmad 3.40 m/s    MV stenosis pressure 1/2 time 68.01 ms    MV Peak A Sarmad 1.06 m/s    LV Systolic Volume 91.93 mL    LV Systolic Volume Index 51.4 mL/m2    LV Diastolic Volume 122.55 mL    LV Diastolic Volume Index 68.46 mL/m2    LA Volume Index 26.7 mL/m2    LV Mass Index 176 g/m2    RA Major Axis 4.22 cm    Left Atrium Minor Axis 4.31 cm    Left Atrium Major Axis 4.11 cm    Triscuspid Valve Regurgitation Peak Gradient 46 mmHg    RA Width 3.03 cm    Right Atrial Pressure (from IVC) 15 mmHg    EF 25 %    TV rest pulmonary artery pressure 61 mmHg    Narrative    · The left ventricle is moderately enlarged with concentric hypertrophy   and severely decreased systolic function.  · The estimated ejection fraction is 25%.  · Grade II left ventricular diastolic dysfunction.  · There is severe left ventricular global hypokinesis.  · Normal right ventricular size with normal right ventricular systolic   function.  · There is mild-to-moderate aortic valve stenosis.  · Aortic valve area is 0.88 cm2; peak velocity is 2.49 m/s; mean gradient   is 17 mmHg.  · Moderate mitral regurgitation.  · Moderate tricuspid regurgitation.  · Elevated central venous pressure (15 mmHg).  · The estimated PA systolic pressure is 61 mmHg.  · There is pulmonary hypertension.  · Trivial circumferential pericardial effusion. Effusion is fluid.        Assessment and Plan:     Brief HPI: plan OMT for mild diffuse CAD and cardiomyopathy.    * New onset of congestive heart failure  Patient is identified as having Systolic (HFrEF) heart failure that is Acute. CHF is currently uncontrolled due to Continued edema of extremities. Latest ECHO performed and demonstrates-  Results for orders placed during the hospital encounter of 01/19/23    Echo    Interpretation Summary  · The left ventricle is moderately enlarged with concentric hypertrophy and severely decreased systolic function.  · The estimated ejection fraction is 25%.  · Grade II left ventricular diastolic dysfunction.  · There is severe left ventricular global hypokinesis.  · Normal right ventricular size with normal right ventricular systolic function.  · There is mild-to-moderate aortic valve stenosis.  · Aortic valve area is 0.88 cm2; peak velocity is 2.49 m/s; mean gradient is 17 mmHg.  · Moderate mitral regurgitation.  · Moderate tricuspid regurgitation.  · Elevated central venous pressure (15 mmHg).  · The estimated PA systolic pressure is 61 mmHg.  · There is pulmonary hypertension.  · Trivial circumferential pericardial effusion. Effusion is fluid.  . Continue Furosemide and monitor clinical status closely. Monitor on telemetry. Patient is on CHF pathway.  Monitor strict Is&Os and daily weights.  Place on fluid restriction of 2 L. Continue to stress to patient importance of self efficacy and  on diet for CHF. Last BNP reviewed- and noted below   Recent Labs   Lab 01/19/23  2341   BNP 2,511*   .  Plan continued diuresis and plan C    1/22/13- add losartan     NSTEMI (non-ST elevated myocardial infarction)  Continue heparin and ASA  Plan [facility] when feasible    1/22/23  Mild diffuse CAD, medical mangement    Elevated troponin  NSTEMI, inferior EKG changes and evidence of diffuse LV dysfunction by echo  Continue Heparin IV  ASA  Plan [facility] when able to lie flat  Now with CHF and edema    Shortness of breath  See CHF  Unable to lie flat at this time    1/21/23  Stable and able to lie flat today    1/22/23  Post cath stable, add losartan    History of pulmonary embolus (PE)  Stable, continue eliquis        VTE Risk Mitigation (From admission, onward)         Ordered     apixaban tablet 2.5 mg  2 times daily          01/22/23 1108     Reason for No Pharmacological VTE Prophylaxis  Once        Question:  Reasons:  Answer:  Physician Provided (leave comment)  Comment:  on heparin drip    01/19/23 2246     IP VTE HIGH RISK PATIENT  Once         01/19/23 2246     Place sequential compression device  Until discontinued         01/19/23 2246                Federico Reyna MD  Cardiology  [facility]

## 2023-01-22 NOTE — SUBJECTIVE & OBJECTIVE
Interval History: See hospital course for today      Review of Systems   Unable to perform ROS: Acuity of condition   Objective:     Vital Signs (Most Recent):  Temp: 97 °F (36.1 °C) (01/22/23 1101)  Pulse: 98 (01/22/23 1101)  Resp: (!) 21 (01/22/23 1101)  BP: 129/67 (01/22/23 1101)  SpO2: (!) 94 % (01/22/23 1101)   Vital Signs (24h Range):  Temp:  [97 °F (36.1 °C)-98.2 °F (36.8 °C)] 97 °F (36.1 °C)  Pulse:  [] 98  Resp:  [17-21] 21  SpO2:  [90 %-97 %] 94 %  BP: (103-129)/(53-67) 129/67     Weight: 71.2 kg (156 lb 15.5 oz)  Body mass index is 25.34 kg/m².    Intake/Output Summary (Last 24 hours) at 1/22/2023 1105  Last data filed at 1/22/2023 0336  Gross per 24 hour   Intake 284.25 ml   Output --   Net 284.25 ml      Physical Exam  Vitals and nursing note reviewed. Exam conducted with a chaperone present (cath lab).   Constitutional:       General: She is sleeping. She is not in acute distress.     Appearance: She is ill-appearing. She is not toxic-appearing.      Interventions: Nasal cannula in place.   HENT:      Head: Normocephalic and atraumatic.   Cardiovascular:      Rate and Rhythm: Normal rate.   Pulmonary:      Effort: Tachypnea and respiratory distress present.      Breath sounds: Decreased air movement present.   Abdominal:      Palpations: Abdomen is soft.      Tenderness: There is no abdominal tenderness.   Genitourinary:     Comments: sullivan  Musculoskeletal:      Right lower leg: No edema.      Left lower leg: No edema.   Skin:     General: Skin is warm and dry.      Capillary Refill: Capillary refill takes 2 to 3 seconds.      Coloration: Skin is pale.   Neurological:      Mental Status: She is easily aroused. Mental status is at baseline. She is lethargic and disoriented.      Motor: Weakness present.   Psychiatric:         Behavior: Behavior is cooperative.       Significant Labs: All pertinent labs within the past 24 hours have been reviewed.  ABGs:   Recent Labs   Lab 01/20/23  1553   PH  7.478*   PCO2 38.4   HCO3 28.5*   POCSATURATED 93*   BE 5   PO2 61*     CBC:   Recent Labs   Lab 01/21/23  0645 01/22/23  0553   WBC 6.88 7.36   HGB 9.8* 9.7*   HCT 30.9* 31.1*    274     CMP:   Recent Labs   Lab 01/21/23  0645 01/22/23  0552     140 137   K 3.5  3.5 3.8     104 99   CO2 26  26 27   GLU 97  98 100   BUN 20  20 23   CREATININE 0.9  0.9 1.0   CALCIUM 8.7  8.5* 8.8   PROT 6.1 6.1   ALBUMIN 2.9* 3.0*   BILITOT 1.1* 1.1*   ALKPHOS 88 91   AST 16 12   ALT 15 13   ANIONGAP 10  10 11       Coagulation:   Recent Labs   Lab 01/22/23  0553   APTT 73.7*         Significant Imaging: I have reviewed all pertinent imaging results/findings within the past 24 hours.

## 2023-01-22 NOTE — ASSESSMENT & PLAN NOTE
Patient with history remote CVA and PE proximally 4 years prior to admission previously treated with Eliquis.  Patient does present with some concerns of recurrent PE but has likely cause of shortness a breath, tachypnea, and hypoxia given CHF exacerbation.  We will obtain D-dimer as it was not obtained in the ED and consider CTA for definitive rule out of PE given history and presentation.  Currently on heparin drip for treatment of NSTEMI with cardiology following and awaiting further evaluation/recommendations.  Plan:  Does not wear supplemental oxygen at baseline  -reat CHF exacerbation and NSTEMI as noted above  elevated d-dimer  vq scan low prob pulmonary embolism     1/22  Disoriented at baseline  Consider homehealth physical/occupational therapy upon discharge given underlying cognitive impairment

## 2023-01-22 NOTE — BRIEF OP NOTE
<[facility] - Cath Lab ([facility])  Cardiology Department  Operative Note    SUMMARY     Date of Procedure: 1/22/2023     Procedure: Procedure(s) (LRB):  CATHETERIZATION, HEART, LEFT (Left)     Surgeon(s) and Role:     * Elias Brown MD - Primary    Assisting Surgeon: None    Pre-Operative Diagnosis: NSTEMI (non-ST elevated myocardial infarction) [I21.4]    Post-Operative Diagnosis: Post-Op Diagnosis Codes:     * NSTEMI (non-ST elevated myocardial infarction) [I21.4]    Anesthesia: RN IV Sedation    Technical Procedures Used: [facility]    Description of the Findings of the Procedure: [facility], Findings: diffuse distal dx LCx ( dominant) , LAD and RCA nonobstructive, EF=20%, 3+ MR, high LVEDP-30s, medical tx    Significant Surgical Tasks Conducted by the Assistant(s), if Applicable: none    Complications: No    Estimated Blood Loss (EBL): < 50 cc           Implants: * No implants in log *    Specimens:   Specimen (24h ago, onward)      None                    Condition: Good    Disposition: PACU - hemodynamically stable.    Attestation: I was present and scrubbed for the entire procedure.

## 2023-01-22 NOTE — PLAN OF CARE
VSS. Fall precautions maintained.   Pain is well controlled. More awake now. Able to have full conversation.  Able to swallow water and apple sauce with no problems.  Right groin Dressing intact and clean,   On O2 NC.  IV fluids maintained.  Blood sugar monitored.   Repositioned q2hrs and as needed.  Family at bedside.   All needs met. Will continue to monitor.

## 2023-01-22 NOTE — ASSESSMENT & PLAN NOTE
Controlled  No antihtn medication(s) on home medication(s) list  Losartan started   Intravenous diuresis  IV hydralazine prn for SBP>160 or DBP>90       1/21  Hypotension  Discontinue losartan  Reduce lasix dose    1/22  Continue monitoring need for antihtn medication(s) while diuresing

## 2023-01-22 NOTE — SUBJECTIVE & OBJECTIVE
Interval History: diffuse CAD and medical management.    Review of Systems   Constitutional: Negative for chills, diaphoresis, night sweats, weight gain and weight loss.   HENT:  Negative for congestion, hoarse voice, sore throat and stridor.    Eyes:  Negative for double vision and pain.   Cardiovascular:  Negative for chest pain, claudication, cyanosis, dyspnea on exertion, irregular heartbeat, leg swelling, near-syncope, orthopnea, palpitations, paroxysmal nocturnal dyspnea and syncope.   Respiratory:  Negative for cough, hemoptysis, shortness of breath, sleep disturbances due to breathing, snoring, sputum production and wheezing.    Endocrine: Negative for cold intolerance, heat intolerance and polydipsia.   Hematologic/Lymphatic: Negative for bleeding problem. Does not bruise/bleed easily.   Skin:  Negative for color change, dry skin and rash.   Musculoskeletal:  Negative for joint swelling and muscle cramps.   Gastrointestinal:  Negative for bloating, abdominal pain, constipation, diarrhea, dysphagia, melena, nausea and vomiting.   Genitourinary:  Negative for flank pain and urgency.   Neurological:  Negative for dizziness, focal weakness, headaches, light-headedness, loss of balance, seizures and weakness.   Psychiatric/Behavioral:  Negative for altered mental status and memory loss. The patient is not nervous/anxious.    Objective:     Vital Signs (Most Recent):  Temp: 97 °F (36.1 °C) (01/22/23 1101)  Pulse: 98 (01/22/23 1101)  Resp: (!) 21 (01/22/23 1101)  BP: 129/67 (01/22/23 1101)  SpO2: (!) 94 % (01/22/23 1101)   Vital Signs (24h Range):  Temp:  [97 °F (36.1 °C)-98.2 °F (36.8 °C)] 97 °F (36.1 °C)  Pulse:  [] 98  Resp:  [17-21] 21  SpO2:  [90 %-97 %] 94 %  BP: (107-129)/(56-67) 129/67     Weight: 71.2 kg (156 lb 15.5 oz)  Body mass index is 25.34 kg/m².     SpO2: (!) 94 %         Intake/Output Summary (Last 24 hours) at 1/22/2023 1207  Last data filed at 1/22/2023 0336  Gross per 24 hour   Intake  284.25 ml   Output --   Net 284.25 ml       Lines/Drains/Airways       Drain  Duration             Female External Urinary Catheter 01/21/23 1900 <1 day              Peripheral Intravenous Line  Duration                  Peripheral IV - Single Lumen 01/19/23 2038 18 G Right Antecubital 2 days         Peripheral IV - Single Lumen 01/19/23 2044 18 G Left Antecubital 2 days                    Physical Exam  Eyes:      Pupils: Pupils are equal, round, and reactive to light.   Neck:      Trachea: No tracheal deviation.   Cardiovascular:      Rate and Rhythm: Normal rate and regular rhythm.      Pulses: Intact distal pulses.           Carotid pulses are 2+ on the right side and 2+ on the left side.       Radial pulses are 2+ on the right side and 2+ on the left side.        Femoral pulses are 2+ on the right side and 2+ on the left side.       Popliteal pulses are 2+ on the right side and 2+ on the left side.        Dorsalis pedis pulses are 2+ on the right side and 2+ on the left side.        Posterior tibial pulses are 2+ on the right side and 2+ on the left side.      Heart sounds: Normal heart sounds. No murmur heard.    No friction rub. No gallop.   Pulmonary:      Effort: Pulmonary effort is normal. No respiratory distress.      Breath sounds: Normal breath sounds. No stridor. No wheezing or rales.   Chest:      Chest wall: No tenderness.   Abdominal:      General: There is no distension.      Tenderness: There is no abdominal tenderness. There is no rebound.   Musculoskeletal:         General: No tenderness.      Cervical back: Normal range of motion.   Skin:     General: Skin is warm and dry.   Neurological:      Mental Status: She is alert and oriented to person, place, and time.       Significant Labs: CMP   Recent Labs   Lab 01/21/23  0645 01/22/23  0552     140 137   K 3.5  3.5 3.8     104 99   CO2 26  26 27   GLU 97  98 100   BUN 20  20 23   CREATININE 0.9  0.9 1.0   CALCIUM 8.7  8.5* 8.8    PROT 6.1 6.1   ALBUMIN 2.9* 3.0*   BILITOT 1.1* 1.1*   ALKPHOS 88 91   AST 16 12   ALT 15 13   ANIONGAP 10  10 11    and Troponin No results for input(s): TROPONINI in the last 48 hours.    Significant Imaging: Echocardiogram: Transthoracic echo (TTE) complete (Cupid Only):   Results for orders placed or performed during the hospital encounter of 01/19/23   Echo   Result Value Ref Range    BSA 1.81 m2    TDI SEPTAL 0.09 m/s    LV LATERAL E/E' RATIO 15.75 m/s    LV SEPTAL E/E' RATIO 14.00 m/s    LA WIDTH 3.80 cm    IVC diameter 1.69 cm    Left Ventricular Outflow Tract Mean Velocity 0.63 cm/s    Left Ventricular Outflow Tract Mean Gradient 1.70 mmHg    TDI LATERAL 0.08 m/s    LVIDd 5.08 3.5 - 6.0 cm    IVS 1.51 (A) 0.6 - 1.1 cm    Posterior Wall 1.40 (A) 0.6 - 1.1 cm    Ao root annulus 2.46 cm    LVIDs 4.49 (A) 2.1 - 4.0 cm    FS 12 28 - 44 %    LA volume 47.84 cm3    Sinus 2.41 cm    STJ 2.53 cm    Ascending aorta 2.70 cm    LV mass 315.92 g    LA size 3.52 cm    TAPSE 1.78 cm    Left Ventricle Relative Wall Thickness 0.55 cm    AV mean gradient 17 mmHg    AV valve area 0.88 cm2    AV Velocity Ratio 0.30     AV index (prosthetic) 0.37     MV valve area p 1/2 method 3.23 cm2    E/A ratio 1.19     Mean e' 0.09 m/s    E wave deceleration time 234.50 msec    IVRT 62.80 msec    LVOT diameter 1.74 cm    LVOT area 2.4 cm2    LVOT peak sarmad 0.74 m/s    LVOT peak VTI 17.50 cm    Ao peak sarmad 2.49 m/s    Ao VTI 47.1 cm    RVOT peak sarmad 0.82 m/s    RVOT peak VTI 16.4 cm    Mr max sarmad 5.20 m/s    LVOT stroke volume 41.59 cm3    AV peak gradient 25 mmHg    PV mean gradient 2.05 mmHg    E/E' ratio 14.82 m/s    MV Peak E Sarmad 1.26 m/s    TR Max Sarmad 3.40 m/s    MV stenosis pressure 1/2 time 68.01 ms    MV Peak A Sarmad 1.06 m/s    LV Systolic Volume 91.93 mL    LV Systolic Volume Index 51.4 mL/m2    LV Diastolic Volume 122.55 mL    LV Diastolic Volume Index 68.46 mL/m2    LA Volume Index 26.7 mL/m2    LV Mass Index 176 g/m2    RA  Major Axis 4.22 cm    Left Atrium Minor Axis 4.31 cm    Left Atrium Major Axis 4.11 cm    Triscuspid Valve Regurgitation Peak Gradient 46 mmHg    RA Width 3.03 cm    Right Atrial Pressure (from IVC) 15 mmHg    EF 25 %    TV rest pulmonary artery pressure 61 mmHg    Narrative    · The left ventricle is moderately enlarged with concentric hypertrophy   and severely decreased systolic function.  · The estimated ejection fraction is 25%.  · Grade II left ventricular diastolic dysfunction.  · There is severe left ventricular global hypokinesis.  · Normal right ventricular size with normal right ventricular systolic   function.  · There is mild-to-moderate aortic valve stenosis.  · Aortic valve area is 0.88 cm2; peak velocity is 2.49 m/s; mean gradient   is 17 mmHg.  · Moderate mitral regurgitation.  · Moderate tricuspid regurgitation.  · Elevated central venous pressure (15 mmHg).  · The estimated PA systolic pressure is 61 mmHg.  · There is pulmonary hypertension.  · Trivial circumferential pericardial effusion. Effusion is fluid.

## 2023-01-22 NOTE — ASSESSMENT & PLAN NOTE
Ischemic evaluation or lhc per cardiology when more compensated     Status post lhc with diffuse disease  Optimize medication(s) and discharge home with follow up cardiology

## 2023-01-23 DIAGNOSIS — J44.9 COPD (CHRONIC OBSTRUCTIVE PULMONARY DISEASE): Primary | ICD-10-CM

## 2023-01-23 LAB
ANION GAP SERPL CALC-SCNC: 14 MMOL/L (ref 8–16)
BASOPHILS # BLD AUTO: 0.04 K/UL (ref 0–0.2)
BASOPHILS NFR BLD: 0.5 % (ref 0–1.9)
BUN SERPL-MCNC: 19 MG/DL (ref 8–23)
CALCIUM SERPL-MCNC: 9.1 MG/DL (ref 8.7–10.5)
CHLORIDE SERPL-SCNC: 97 MMOL/L (ref 95–110)
CO2 SERPL-SCNC: 26 MMOL/L (ref 23–29)
CREAT SERPL-MCNC: 1 MG/DL (ref 0.5–1.4)
DIFFERENTIAL METHOD: ABNORMAL
EOSINOPHIL # BLD AUTO: 0.1 K/UL (ref 0–0.5)
EOSINOPHIL NFR BLD: 0.6 % (ref 0–8)
ERYTHROCYTE [DISTWIDTH] IN BLOOD BY AUTOMATED COUNT: 15.1 % (ref 11.5–14.5)
EST. GFR  (NO RACE VARIABLE): >60 ML/MIN/1.73 M^2
GLUCOSE SERPL-MCNC: 127 MG/DL (ref 70–110)
HCT VFR BLD AUTO: 34.7 % (ref 37–48.5)
HGB BLD-MCNC: 10.9 G/DL (ref 12–16)
IMM GRANULOCYTES # BLD AUTO: 0.04 K/UL (ref 0–0.04)
IMM GRANULOCYTES NFR BLD AUTO: 0.5 % (ref 0–0.5)
LYMPHOCYTES # BLD AUTO: 0.8 K/UL (ref 1–4.8)
LYMPHOCYTES NFR BLD: 9.1 % (ref 18–48)
MCH RBC QN AUTO: 26.3 PG (ref 27–31)
MCHC RBC AUTO-ENTMCNC: 31.4 G/DL (ref 32–36)
MCV RBC AUTO: 84 FL (ref 82–98)
MONOCYTES # BLD AUTO: 0.8 K/UL (ref 0.3–1)
MONOCYTES NFR BLD: 9.2 % (ref 4–15)
NEUTROPHILS # BLD AUTO: 6.9 K/UL (ref 1.8–7.7)
NEUTROPHILS NFR BLD: 80.1 % (ref 38–73)
NRBC BLD-RTO: 0 /100 WBC
PLATELET # BLD AUTO: 293 K/UL (ref 150–450)
PMV BLD AUTO: 9.9 FL (ref 9.2–12.9)
POCT GLUCOSE: 102 MG/DL (ref 70–110)
POCT GLUCOSE: 146 MG/DL (ref 70–110)
POCT GLUCOSE: 93 MG/DL (ref 70–110)
POCT GLUCOSE: 98 MG/DL (ref 70–110)
POTASSIUM SERPL-SCNC: 4 MMOL/L (ref 3.5–5.1)
RBC # BLD AUTO: 4.14 M/UL (ref 4–5.4)
SODIUM SERPL-SCNC: 137 MMOL/L (ref 136–145)
WBC # BLD AUTO: 8.6 K/UL (ref 3.9–12.7)

## 2023-01-23 PROCEDURE — 94640 AIRWAY INHALATION TREATMENT: CPT

## 2023-01-23 PROCEDURE — 99232 PR SUBSEQUENT HOSPITAL CARE,LEVL II: ICD-10-PCS | Mod: ,,,

## 2023-01-23 PROCEDURE — 25000003 PHARM REV CODE 250: Performed by: FAMILY MEDICINE

## 2023-01-23 PROCEDURE — 63600175 PHARM REV CODE 636 W HCPCS: Performed by: FAMILY MEDICINE

## 2023-01-23 PROCEDURE — 25000242 PHARM REV CODE 250 ALT 637 W/ HCPCS: Performed by: HOSPITALIST

## 2023-01-23 PROCEDURE — 80048 BASIC METABOLIC PNL TOTAL CA: CPT | Performed by: INTERNAL MEDICINE

## 2023-01-23 PROCEDURE — 99900035 HC TECH TIME PER 15 MIN (STAT)

## 2023-01-23 PROCEDURE — 99232 PR SUBSEQUENT HOSPITAL CARE,LEVL II: ICD-10-PCS | Mod: ,,, | Performed by: INTERNAL MEDICINE

## 2023-01-23 PROCEDURE — 25000003 PHARM REV CODE 250: Performed by: HOSPITALIST

## 2023-01-23 PROCEDURE — 85025 COMPLETE CBC W/AUTO DIFF WBC: CPT | Performed by: INTERNAL MEDICINE

## 2023-01-23 PROCEDURE — 27000221 HC OXYGEN, UP TO 24 HOURS

## 2023-01-23 PROCEDURE — 94761 N-INVAS EAR/PLS OXIMETRY MLT: CPT

## 2023-01-23 PROCEDURE — 11000001 HC ACUTE MED/SURG PRIVATE ROOM

## 2023-01-23 PROCEDURE — 25000242 PHARM REV CODE 250 ALT 637 W/ HCPCS: Performed by: INTERNAL MEDICINE

## 2023-01-23 PROCEDURE — 21400001 HC TELEMETRY ROOM

## 2023-01-23 PROCEDURE — 25000003 PHARM REV CODE 250: Performed by: INTERNAL MEDICINE

## 2023-01-23 PROCEDURE — 99232 SBSQ HOSP IP/OBS MODERATE 35: CPT | Mod: ,,,

## 2023-01-23 PROCEDURE — 99232 SBSQ HOSP IP/OBS MODERATE 35: CPT | Mod: ,,, | Performed by: INTERNAL MEDICINE

## 2023-01-23 PROCEDURE — 36415 COLL VENOUS BLD VENIPUNCTURE: CPT | Performed by: INTERNAL MEDICINE

## 2023-01-23 RX ORDER — FUROSEMIDE 40 MG/1
40 TABLET ORAL DAILY
Status: DISCONTINUED | OUTPATIENT
Start: 2023-01-24 | End: 2023-01-24

## 2023-01-23 RX ADMIN — BUDESONIDE 0.5 MG: 0.5 INHALANT ORAL at 08:01

## 2023-01-23 RX ADMIN — IPRATROPIUM BROMIDE AND ALBUTEROL SULFATE 3 ML: 2.5; .5 SOLUTION RESPIRATORY (INHALATION) at 08:01

## 2023-01-23 RX ADMIN — ASPIRIN 81 MG: 81 TABLET, COATED ORAL at 09:01

## 2023-01-23 RX ADMIN — PANTOPRAZOLE SODIUM 40 MG: 40 TABLET, DELAYED RELEASE ORAL at 09:01

## 2023-01-23 RX ADMIN — ARFORMOTEROL TARTRATE 15 MCG: 15 SOLUTION RESPIRATORY (INHALATION) at 07:01

## 2023-01-23 RX ADMIN — IPRATROPIUM BROMIDE AND ALBUTEROL SULFATE 3 ML: 2.5; .5 SOLUTION RESPIRATORY (INHALATION) at 01:01

## 2023-01-23 RX ADMIN — BUDESONIDE 0.5 MG: 0.5 INHALANT ORAL at 07:01

## 2023-01-23 RX ADMIN — ESCITALOPRAM OXALATE 10 MG: 10 TABLET ORAL at 09:01

## 2023-01-23 RX ADMIN — APIXABAN 2.5 MG: 2.5 TABLET, FILM COATED ORAL at 09:01

## 2023-01-23 RX ADMIN — IPRATROPIUM BROMIDE AND ALBUTEROL SULFATE 3 ML: 2.5; .5 SOLUTION RESPIRATORY (INHALATION) at 12:01

## 2023-01-23 RX ADMIN — POTASSIUM CHLORIDE 20 MEQ: 1500 TABLET, EXTENDED RELEASE ORAL at 09:01

## 2023-01-23 RX ADMIN — IPRATROPIUM BROMIDE AND ALBUTEROL SULFATE 3 ML: 2.5; .5 SOLUTION RESPIRATORY (INHALATION) at 07:01

## 2023-01-23 RX ADMIN — ARFORMOTEROL TARTRATE 15 MCG: 15 SOLUTION RESPIRATORY (INHALATION) at 08:01

## 2023-01-23 RX ADMIN — FUROSEMIDE 40 MG: 10 INJECTION, SOLUTION INTRAMUSCULAR; INTRAVENOUS at 09:01

## 2023-01-23 NOTE — SUBJECTIVE & OBJECTIVE
Review of Systems   Constitutional: Negative.   HENT: Negative.     Eyes: Negative.    Cardiovascular: Negative.    Respiratory:  Positive for shortness of breath.    Skin: Negative.    Musculoskeletal: Negative.    Gastrointestinal: Negative.    Genitourinary: Negative.    Neurological: Negative.    Psychiatric/Behavioral: Negative.     Objective:     Vital Signs (Most Recent):  Temp: 97.6 °F (36.4 °C) (01/23/23 1152)  Pulse: 97 (01/23/23 1206)  Resp: 16 (01/23/23 1206)  BP: (!) 110/56 (01/23/23 1152)  SpO2: (!) 92 % (01/23/23 1206)   Vital Signs (24h Range):  Temp:  [97.6 °F (36.4 °C)-98.6 °F (37 °C)] 97.6 °F (36.4 °C)  Pulse:  [90-97] 97  Resp:  [14-20] 16  SpO2:  [92 %-99 %] 92 %  BP: ()/(53-69) 110/56     Weight: 69.8 kg (153 lb 14.1 oz)  Body mass index is 24.84 kg/m².     SpO2: (!) 92 %         Intake/Output Summary (Last 24 hours) at 1/23/2023 1535  Last data filed at 1/23/2023 0835  Gross per 24 hour   Intake --   Output 600 ml   Net -600 ml       Lines/Drains/Airways       Drain  Duration             Female External Urinary Catheter 01/21/23 1900 1 day              Peripheral Intravenous Line  Duration                  Peripheral IV - Single Lumen 01/19/23 2044 18 G Left Antecubital 3 days                    Physical Exam  Vitals and nursing note reviewed.   Constitutional:       Appearance: Normal appearance.   HENT:      Head: Normocephalic.   Eyes:      Pupils: Pupils are equal, round, and reactive to light.   Cardiovascular:      Rate and Rhythm: Normal rate and regular rhythm.      Heart sounds: Normal heart sounds, S1 normal and S2 normal. No murmur heard.    No S3 or S4 sounds.   Pulmonary:      Effort: Pulmonary effort is normal.      Breath sounds: Normal breath sounds.   Abdominal:      General: Bowel sounds are normal.      Palpations: Abdomen is soft.   Musculoskeletal:         General: Normal range of motion.      Cervical back: Normal range of motion.   Skin:     Capillary Refill:  Capillary refill takes less than 2 seconds.   Neurological:      General: No focal deficit present.      Mental Status: She is alert and oriented to person, place, and time.   Psychiatric:         Mood and Affect: Mood normal.         Behavior: Behavior normal.         Thought Content: Thought content normal.       Significant Labs: BMP:   Recent Labs   Lab 01/22/23  0552 01/23/23  1116    127*    137   K 3.8 4.0   CL 99 97   CO2 27 26   BUN 23 19   CREATININE 1.0 1.0   CALCIUM 8.8 9.1   , CMP   Recent Labs   Lab 01/22/23  0552 01/23/23  1116    137   K 3.8 4.0   CL 99 97   CO2 27 26    127*   BUN 23 19   CREATININE 1.0 1.0   CALCIUM 8.8 9.1   PROT 6.1  --    ALBUMIN 3.0*  --    BILITOT 1.1*  --    ALKPHOS 91  --    AST 12  --    ALT 13  --    ANIONGAP 11 14   , CBC   Recent Labs   Lab 01/22/23  0553 01/23/23  1116   WBC 7.36 8.60   HGB 9.7* 10.9*   HCT 31.1* 34.7*    293   , INR No results for input(s): INR, PROTIME in the last 48 hours., Lipid Panel No results for input(s): CHOL, HDL, LDLCALC, TRIG, CHOLHDL in the last 48 hours., Troponin No results for input(s): TROPONINI in the last 48 hours., and All pertinent lab results from the last 24 hours have been reviewed.    Significant Imaging: Cardiac Cath: reviewed, Echocardiogram: Transthoracic echo (TTE) complete (Cupid Only):   Results for orders placed or performed during the hospital encounter of 01/19/23   Echo   Result Value Ref Range    BSA 1.81 m2    TDI SEPTAL 0.09 m/s    LV LATERAL E/E' RATIO 15.75 m/s    LV SEPTAL E/E' RATIO 14.00 m/s    LA WIDTH 3.80 cm    IVC diameter 1.69 cm    Left Ventricular Outflow Tract Mean Velocity 0.63 cm/s    Left Ventricular Outflow Tract Mean Gradient 1.70 mmHg    TDI LATERAL 0.08 m/s    LVIDd 5.08 3.5 - 6.0 cm    IVS 1.51 (A) 0.6 - 1.1 cm    Posterior Wall 1.40 (A) 0.6 - 1.1 cm    Ao root annulus 2.46 cm    LVIDs 4.49 (A) 2.1 - 4.0 cm    FS 12 28 - 44 %    LA volume 47.84 cm3    Sinus 2.41  cm    STJ 2.53 cm    Ascending aorta 2.70 cm    LV mass 315.92 g    LA size 3.52 cm    TAPSE 1.78 cm    Left Ventricle Relative Wall Thickness 0.55 cm    AV mean gradient 17 mmHg    AV valve area 0.88 cm2    AV Velocity Ratio 0.30     AV index (prosthetic) 0.37     MV valve area p 1/2 method 3.23 cm2    E/A ratio 1.19     Mean e' 0.09 m/s    E wave deceleration time 234.50 msec    IVRT 62.80 msec    LVOT diameter 1.74 cm    LVOT area 2.4 cm2    LVOT peak sarmad 0.74 m/s    LVOT peak VTI 17.50 cm    Ao peak sarmad 2.49 m/s    Ao VTI 47.1 cm    RVOT peak sarmad 0.82 m/s    RVOT peak VTI 16.4 cm    Mr max sarmad 5.20 m/s    LVOT stroke volume 41.59 cm3    AV peak gradient 25 mmHg    PV mean gradient 2.05 mmHg    E/E' ratio 14.82 m/s    MV Peak E Sarmad 1.26 m/s    TR Max Sarmad 3.40 m/s    MV stenosis pressure 1/2 time 68.01 ms    MV Peak A Sarmad 1.06 m/s    LV Systolic Volume 91.93 mL    LV Systolic Volume Index 51.4 mL/m2    LV Diastolic Volume 122.55 mL    LV Diastolic Volume Index 68.46 mL/m2    LA Volume Index 26.7 mL/m2    LV Mass Index 176 g/m2    RA Major Axis 4.22 cm    Left Atrium Minor Axis 4.31 cm    Left Atrium Major Axis 4.11 cm    Triscuspid Valve Regurgitation Peak Gradient 46 mmHg    RA Width 3.03 cm    Right Atrial Pressure (from IVC) 15 mmHg    EF 25 %    TV rest pulmonary artery pressure 61 mmHg    Narrative    · The left ventricle is moderately enlarged with concentric hypertrophy   and severely decreased systolic function.  · The estimated ejection fraction is 25%.  · Grade II left ventricular diastolic dysfunction.  · There is severe left ventricular global hypokinesis.  · Normal right ventricular size with normal right ventricular systolic   function.  · There is mild-to-moderate aortic valve stenosis.  · Aortic valve area is 0.88 cm2; peak velocity is 2.49 m/s; mean gradient   is 17 mmHg.  · Moderate mitral regurgitation.  · Moderate tricuspid regurgitation.  · Elevated central venous pressure (15 mmHg).  · The  estimated PA systolic pressure is 61 mmHg.  · There is pulmonary hypertension.  · Trivial circumferential pericardial effusion. Effusion is fluid.      , EKG: reviewed, Stress Test: reviewed, and X-Ray: CXR: X-Ray Chest 1 View (CXR): No results found for this visit on 01/19/23.

## 2023-01-23 NOTE — SUBJECTIVE & OBJECTIVE
Interval History:     ROS complete and negative unless stated in the interval HPI      Objective:     Vital Signs (Most Recent):  Temp: 98.6 °F (37 °C) (01/23/23 0749)  Pulse: 97 (01/23/23 0824)  Resp: 20 (01/23/23 0824)  BP: (!) 109/53 (01/23/23 0749)  SpO2: 98 % (01/23/23 0824)   Vital Signs (24h Range):  Temp:  [97.6 °F (36.4 °C)-98.6 °F (37 °C)] 98.6 °F (37 °C)  Pulse:  [90-97] 97  Resp:  [14-20] 20  SpO2:  [96 %-99 %] 98 %  BP: ()/(53-69) 109/53     Weight: 69.8 kg (153 lb 14.1 oz)  Body mass index is 24.84 kg/m².      Intake/Output Summary (Last 24 hours) at 1/23/2023 1120  Last data filed at 1/23/2023 0835  Gross per 24 hour   Intake --   Output 1400 ml   Net -1400 ml       Physical Exam  Vitals reviewed.   Constitutional:       General: She is awake. She is not in acute distress.     Appearance: She is well-developed.      Comments: Appears weak and chronically ill, semi-upright in bed on NC on NAD   Cardiovascular:      Pulses:           Radial pulses are 2+ on the right side and 2+ on the left side.      Comments: BLE edema improved  Pulmonary:      Effort: Pulmonary effort is normal.      Breath sounds: Normal breath sounds.      Comments: On NC  Abdominal:      General: There is no distension.      Palpations: Abdomen is soft.   Musculoskeletal:      Comments: FLYNN, generally weak    Skin:     General: Skin is warm and dry.   Neurological:      General: No focal deficit present.      Mental Status: She is alert.   Psychiatric:         Behavior: Behavior is cooperative.         Vents:  Oxygen Concentration (%): 28 (01/23/23 0824)    Lines/Drains/Airways       Drain  Duration             Female External Urinary Catheter 01/21/23 1900 1 day              Peripheral Intravenous Line  Duration                  Peripheral IV - Single Lumen 01/19/23 2044 18 G Left Antecubital 3 days                    Significant Labs:    CBC/Anemia Profile:  Recent Labs   Lab 01/22/23  0553   WBC 7.36   HGB 9.7*   HCT  31.1*      MCV 84   RDW 15.3*        Chemistries:  Recent Labs   Lab 01/22/23  0552      K 3.8   CL 99   CO2 27   BUN 23   CREATININE 1.0   CALCIUM 8.8   ALBUMIN 3.0*   PROT 6.1   BILITOT 1.1*   ALKPHOS 91   ALT 13   AST 12       All pertinent labs within the past 24 hours have been reviewed.    Significant Imaging:  I have reviewed all pertinent imaging results/findings within the past 24 hours.

## 2023-01-23 NOTE — ASSESSMENT & PLAN NOTE
- suspect mostly s/t newly found advanced HF, resp status much improved with volume mgmt   - will need f/u in the pulmonary clinic for documented underlying COPD on Trelegy that was followed per Dr. Leger before his group home   - wheezing essentially resolved   - continue scheduled nebs  - continue Pulmicort and Brovana  - continue supplemental oxygen to target goal SpO2 > 90%

## 2023-01-23 NOTE — ASSESSMENT & PLAN NOTE
Patient is identified as having acute systolic heart failure. CHF is currently uncontrolled as evidenced by orthopnea and rales / crackles on pulmonary exam. Last BNP 2511. Patient is on CHF Pathway.    Latest ECHO on January 20, 2023 performed and demonstrates:  · The left ventricle is moderately enlarged with concentric hypertrophy and severely decreased systolic function.  · The estimated ejection fraction is 25%.  · Grade II left ventricular diastolic dysfunction.  · There is severe left ventricular global hypokinesis.  · Normal right ventricular size with normal right ventricular systolic function.  · There is mild-to-moderate aortic valve stenosis.  · Aortic valve area is 0.88 cm2; peak velocity is 2.49 m/s; mean gradient is 17 mmHg.  · Moderate mitral regurgitation. Moderate tricuspid regurgitation.  · Elevated central venous pressure (15 mmHg).  · The estimated PA systolic pressure is 61 mmHg. There is pulmonary hypertension.  · Trivial circumferential pericardial effusion. Effusion is fluid.    - continue diuresis  - continue telemetry and monitor clinical / hemodynamics status closely  - monitor strict I&Os with daily weights; continue fluid restriction of 1.5L / daily  - Cardiology following and managing, [facility] 1/22 with: EF 20%, 3+ MR, and diffuse CAD that is being medically managed above  - wean supplemental O2 as able for sat of 92% or better

## 2023-01-23 NOTE — ASSESSMENT & PLAN NOTE
See CHF  Unable to lie flat at this time    1/21/23  Stable and able to lie flat today    1/22/23  Post cath stable, add losartan    1/23/23  Breathing improving, on NC

## 2023-01-23 NOTE — PROGRESS NOTES
[facility] Medicine  Progress Note    Patient Name: Violeta Scherer  MRN: [id number]  Patient Class: IP- Inpatient   Admission Date: 1/19/2023  Length of Stay: 4 days  Attending Physician: Roberto Perkins, *  Primary Care Provider: Karrie Birmingham MD        Subjective:     Principal Problem:New onset of congestive heart failure        HPI:  Violeta Scherer is a 72 y.o. female with a PMH  has a past medical history of Hypertension, Other pulmonary embolism without acute cor pulmonale, and Stroke. who presented to the ED for further evaluation of progressive worsening shortness of breath x2 days duration.  Patient reportedly had acute onset and progressively worsening shortness of breath over the past 3 days with patient now reporting 3-4 pillow orthopnea, PND, dyspnea on minimal exertion, and bilateral lower extremity pitting edema.  Patient denies history of heart failure, COPD, of prior MIs but did have history of previous stroke/TIA and PE's.  Aggravating factors included those noted above with no known alleviating factors.  All other review of systems negative including for lightheadedness, dizziness, headache, visual changes, fever, chills, sweats, nausea, vomiting, chest pain, abdominal pain, dysuria, hematuria, melena, hematochezia, constipation, diarrhea, or onset neurological deficits.  Prior to onset of symptoms, patient reported being in her usual state health no other concerns or complaints.  Initial workup in the ED revealed patient to have evidence consistent with signs/symptoms of new onset CHF and NSTEMI.  Hospital Medicine consulted by ED staff for admission for continued medical management.      PCP: Karrie Birmingham        Overview/Hospital Course:  1/20 admitted for new onset congestive heart failure exacerbation. Echocardiogram with ejection fraction 25%. Cardiology consulted and diuresing. Family endorses orthopnea, increased lower extremity edema. Daughter reports patient  did not want to come to hospital. Attempted goals of care discussion but patient unable to participate. Critical care consult. Abg pending  1/21 abg within normal limits. Continue monitoring need for nippv. Diuresing well with intravenous lasix. Tachypnea improving. Disoriented at baseline per daughter. Daughter states baclofen causing drowsiness  1/22 status post [facility]. Tolerated well. Remains dyspneic. Continue diuresing. Optimize medication(s) and breathing with diuretics, nitroglycerin, oxygen evaluation. Anticipate discharge 1-2 days.  1/23 S/P  LHC show :Diffuse CAD and medical management. Case D/W Cardiology which rec life vest . NAEON . Denies any new complains .       Interval History:     Review of Systems   Constitutional:  Positive for activity change and fatigue.   HENT: Negative.     Eyes: Negative.    Respiratory: Negative.     Cardiovascular: Negative.    Gastrointestinal: Negative.    Endocrine: Negative.    Genitourinary: Negative.    Musculoskeletal: Negative.    Skin: Negative.    Allergic/Immunologic: Negative.    Neurological: Negative.    Hematological: Negative.    Psychiatric/Behavioral: Negative.     Objective:     Vital Signs (Most Recent):  Temp: 98.2 °F (36.8 °C) (01/23/23 1548)  Pulse: 90 (01/23/23 1548)  Resp: 19 (01/23/23 1548)  BP: (!) 102/50 (01/23/23 1548)  SpO2: (!) 91 % (01/23/23 1548)   Vital Signs (24h Range):  Temp:  [97.6 °F (36.4 °C)-98.6 °F (37 °C)] 98.2 °F (36.8 °C)  Pulse:  [90-97] 90  Resp:  [14-20] 19  SpO2:  [91 %-99 %] 91 %  BP: ()/(50-69) 102/50     Weight: 69.8 kg (153 lb 14.1 oz)  Body mass index is 24.84 kg/m².    Intake/Output Summary (Last 24 hours) at 1/23/2023 0717  Last data filed at 1/23/2023 0835  Gross per 24 hour   Intake --   Output 600 ml   Net -600 ml      Physical Exam  Constitutional:       Appearance: She is ill-appearing.   HENT:      Head: Normocephalic and atraumatic.      Nose: Nose normal.   Eyes:      Extraocular Movements: Extraocular  movements intact.      Conjunctiva/sclera: Conjunctivae normal.      Pupils: Pupils are equal, round, and reactive to light.   Cardiovascular:      Rate and Rhythm: Normal rate and regular rhythm.      Pulses: Normal pulses.      Heart sounds: No murmur heard.  Pulmonary:      Effort: Pulmonary effort is normal. No respiratory distress.      Breath sounds: No stridor. No wheezing or rhonchi.   Abdominal:      General: Abdomen is flat. Bowel sounds are normal. There is no distension.      Palpations: Abdomen is soft. There is no mass.      Tenderness: There is no abdominal tenderness. There is no guarding or rebound.      Hernia: No hernia is present.   Musculoskeletal:         General: Normal range of motion.      Cervical back: Normal range of motion. No rigidity or tenderness.      Right lower leg: Edema present.      Left lower leg: Edema present.   Skin:     General: Skin is warm.   Neurological:      General: No focal deficit present.      Mental Status: She is alert and oriented to person, place, and time. Mental status is at baseline.      Cranial Nerves: No cranial nerve deficit.      Sensory: No sensory deficit.   Psychiatric:         Mood and Affect: Mood normal.       Significant Labs: All pertinent labs within the past 24 hours have been reviewed.      Significant Imaging: I have reviewed all pertinent imaging results/findings within the past 24 hours.        Assessment/Plan:      * New onset of congestive heart failure  Patient is identified as having heart failure that is new onset. CHF is currently uncontrolled due to Continued edema of extremities, >3 pillow orthopnea, Rales/crackles on pulmonary exam and Pulmonary edema/pleural effusion on CXR. Latest ECHO performed and demonstrates- No results found for this or any previous visit.  . Continue Beta Blocker and Furosemide and monitor clinical status closely. Monitor on telemetry. Patient is on CHF pathway.  Monitor strict Is&Os and daily weights.   Place on fluid restriction of 1.5 L. Continue to stress to patient importance of self efficacy and  on diet for CHF. Last BNP reviewed- and noted below   Recent Labs   Lab 01/19/23  2341   BNP 2,511*     Intravenous diuresising  Cardiology consulted  Critical care consult for higher level of care  abg pending  If hypercapnic, transfer to icu for nippv    1/21  Awaiting euvolemic/compensated for lhc    1/22  Continue diuresing  Ambulatory oxygen evaluation closer to discharge       GERD (gastroesophageal reflux disease)  .Chronic. Stable. Currently asymptomatic. Home medications include PPI/Antacids as needed.  Plan:  -Continue PPI/Antacids as needed       Depression  Chronic. Stable. Not in acute exacerbation and currently denies endorsing any suicidal/homicidal ideations.   Plan:  -Continue home medications       HLD (hyperlipidemia)  Patient is chronically on statin.will continue for now. Last Lipid Panel:   Lab Results   Component Value Date    CHOL 132 01/20/2023    HDL 45 01/20/2023    LDLCALC 75.8 01/20/2023    TRIG 56 01/20/2023    CHOLHDL 34.1 01/20/2023     Plan:  -Continue home medication  Lipid panel within normal limits         NSTEMI (non-ST elevated myocardial infarction)  Ischemic evaluation or lhc per cardiology when more compensated     Status post lhc with diffuse disease  Optimize medication(s) and discharge home with follow up cardiology     Elevated troponin  Patient is doing found to have elevated troponin measuring 0.745 with repeat 0.758.  EKG shows sinus tachycardia with T-wave inversions in inferior and anterior lateral leads. Patient without evidence of chest pain does show cardiomyopathy with presence of new onset heart failure.  May be secondary to demand ischemia but treating as NSTEMI in initiated on heparin.  Cardiology consulted by ED staff and aware of patient and awaiting further evaluation in the morning.  Recommended administering aspirin, statin, and beta-blocker which  was provided with the exception of beta-blocker due to possible new onset heart failure and provided     1/21  Cardiology following  On heparin infusion  Planning for ischemic evaluation or lhc when able    Status post c with diffuse disease  Optimize medical management and follow up outpatient cardiology     Shortness of breath  Patient presented with worsening dyspnea/shortness of breath as noted above likely secondary to new onset CHF.  Patient currently saturating % on room air.  Respiratory rate ranging from 17-26.  She remains afebrile without leukocytosis with a/HI baseline currently measuring 10.8/34.6.  BNP elevated at 1940 with chest x-ray revealing mild cardiomegaly with mild perihilar interstitial opacity and this likely trace pleural effusions or pleural thickening.  D-dimer not obtained in the ED.  History of PE in the past but not severely hypoxic or tachycardic with tachypnea improving following diuresing.  Patient provided 20 mg IV Lasix x1.  Cardiology consulted and following patient.  Plan:  See congestive heart failure plan      History of pulmonary embolus (PE)  On heparin gtt     Status post c  Discontinue heparin gtt  Resume home a/c    History of CVA (cerebrovascular accident)  Patient with history remote CVA and PE proximally 4 years prior to admission previously treated with Eliquis.  Patient does present with some concerns of recurrent PE but has likely cause of shortness a breath, tachypnea, and hypoxia given CHF exacerbation.  We will obtain D-dimer as it was not obtained in the ED and consider CTA for definitive rule out of PE given history and presentation.  Currently on heparin drip for treatment of NSTEMI with cardiology following and awaiting further evaluation/recommendations.  Plan:  Does not wear supplemental oxygen at baseline  -reat CHF exacerbation and NSTEMI as noted above  elevated d-dimer  vq scan low prob pulmonary embolism     1/22  Disoriented at  baseline  Consider homehealth physical/occupational therapy upon discharge given underlying cognitive impairment      Primary hypertension  Controlled  No antihtn medication(s) on home medication(s) list  Losartan started   Intravenous diuresis  IV hydralazine prn for SBP>160 or DBP>90       1/21  Hypotension  Discontinue losartan  Reduce lasix dose    1/22  Continue monitoring need for antihtn medication(s) while diuresing       VTE Risk Mitigation (From admission, onward)         Ordered     apixaban tablet 2.5 mg  2 times daily         01/22/23 1108     Reason for No Pharmacological VTE Prophylaxis  Once        Question:  Reasons:  Answer:  Physician Provided (leave comment)  Comment:  on heparin drip    01/19/23 2246     IP VTE HIGH RISK PATIENT  Once         01/19/23 2246     Place sequential compression device  Until discontinued         01/19/23 2246                Discharge Planning   SHIVANI:      Code Status: Full Code   Is the patient medically ready for discharge?:     Reason for patient still in hospital (select all that apply): Treatment  Discharge Plan A: Home with family                  Roberto Perkins MD  Department of Hospital Medicine   [facility]

## 2023-01-23 NOTE — ASSESSMENT & PLAN NOTE
Patient is identified as having Systolic (HFrEF) heart failure that is Acute. CHF is currently uncontrolled due to Continued edema of extremities. Latest ECHO performed and demonstrates- Results for orders placed during the hospital encounter of 01/19/23    Echo    Interpretation Summary  · The left ventricle is moderately enlarged with concentric hypertrophy and severely decreased systolic function.  · The estimated ejection fraction is 25%.  · Grade II left ventricular diastolic dysfunction.  · There is severe left ventricular global hypokinesis.  · Normal right ventricular size with normal right ventricular systolic function.  · There is mild-to-moderate aortic valve stenosis.  · Aortic valve area is 0.88 cm2; peak velocity is 2.49 m/s; mean gradient is 17 mmHg.  · Moderate mitral regurgitation.  · Moderate tricuspid regurgitation.  · Elevated central venous pressure (15 mmHg).  · The estimated PA systolic pressure is 61 mmHg.  · There is pulmonary hypertension.  · Trivial circumferential pericardial effusion. Effusion is fluid.  . Continue Furosemide and monitor clinical status closely. Monitor on telemetry. Patient is on CHF pathway.  Monitor strict Is&Os and daily weights.  Place on fluid restriction of 2 L. Continue to stress to patient importance of self efficacy and  on diet for CHF. Last BNP reviewed- and noted below   Recent Labs   Lab 01/19/23  2341   BNP 2,511*   .  Plan continued diuresis and plan [facility]    1/22/13- add losartan     1/23/23  Cont OMT ARB, lasix  Not on BB due to respiratory status, low Bps in hospital  Follow up in [facility]  LifeVest ordered

## 2023-01-23 NOTE — SUBJECTIVE & OBJECTIVE
Interval History:     Review of Systems   Constitutional:  Positive for activity change and fatigue.   HENT: Negative.     Eyes: Negative.    Respiratory: Negative.     Cardiovascular: Negative.    Gastrointestinal: Negative.    Endocrine: Negative.    Genitourinary: Negative.    Musculoskeletal: Negative.    Skin: Negative.    Allergic/Immunologic: Negative.    Neurological: Negative.    Hematological: Negative.    Psychiatric/Behavioral: Negative.     Objective:     Vital Signs (Most Recent):  Temp: 98.2 °F (36.8 °C) (01/23/23 1548)  Pulse: 90 (01/23/23 1548)  Resp: 19 (01/23/23 1548)  BP: (!) 102/50 (01/23/23 1548)  SpO2: (!) 91 % (01/23/23 1548)   Vital Signs (24h Range):  Temp:  [97.6 °F (36.4 °C)-98.6 °F (37 °C)] 98.2 °F (36.8 °C)  Pulse:  [90-97] 90  Resp:  [14-20] 19  SpO2:  [91 %-99 %] 91 %  BP: ()/(50-69) 102/50     Weight: 69.8 kg (153 lb 14.1 oz)  Body mass index is 24.84 kg/m².    Intake/Output Summary (Last 24 hours) at 1/23/2023 1634  Last data filed at 1/23/2023 0835  Gross per 24 hour   Intake --   Output 600 ml   Net -600 ml      Physical Exam  Constitutional:       Appearance: She is ill-appearing.   HENT:      Head: Normocephalic and atraumatic.      Nose: Nose normal.   Eyes:      Extraocular Movements: Extraocular movements intact.      Conjunctiva/sclera: Conjunctivae normal.      Pupils: Pupils are equal, round, and reactive to light.   Cardiovascular:      Rate and Rhythm: Normal rate and regular rhythm.      Pulses: Normal pulses.      Heart sounds: No murmur heard.  Pulmonary:      Effort: Pulmonary effort is normal. No respiratory distress.      Breath sounds: No stridor. No wheezing or rhonchi.   Abdominal:      General: Abdomen is flat. Bowel sounds are normal. There is no distension.      Palpations: Abdomen is soft. There is no mass.      Tenderness: There is no abdominal tenderness. There is no guarding or rebound.      Hernia: No hernia is present.   Musculoskeletal:          General: Normal range of motion.      Cervical back: Normal range of motion. No rigidity or tenderness.      Right lower leg: Edema present.      Left lower leg: Edema present.   Skin:     General: Skin is warm.   Neurological:      General: No focal deficit present.      Mental Status: She is alert and oriented to person, place, and time. Mental status is at baseline.      Cranial Nerves: No cranial nerve deficit.      Sensory: No sensory deficit.   Psychiatric:         Mood and Affect: Mood normal.       Significant Labs: All pertinent labs within the past 24 hours have been reviewed.      Significant Imaging: I have reviewed all pertinent imaging results/findings within the past 24 hours.

## 2023-01-23 NOTE — PROGRESS NOTES
[facility]  Pulmonology Progress Note    Patient Name: Violeta Scherer  MRN: [id number]  Admission Date: 1/19/2023  Hospital Length of Stay: 4 days  Code Status: Full Code  Attending Provider: Roberto Perkins, *  Primary Care Provider: Karrie Birmingham MD   Principal Problem: New onset of congestive heart failure    Subjective:     Interval History:     ROS complete and negative unless stated in the interval HPI      Objective:     Vital Signs (Most Recent):  Temp: 98.6 °F (37 °C) (01/23/23 0749)  Pulse: 97 (01/23/23 0824)  Resp: 20 (01/23/23 0824)  BP: (!) 109/53 (01/23/23 0749)  SpO2: 98 % (01/23/23 0824)   Vital Signs (24h Range):  Temp:  [97.6 °F (36.4 °C)-98.6 °F (37 °C)] 98.6 °F (37 °C)  Pulse:  [90-97] 97  Resp:  [14-20] 20  SpO2:  [96 %-99 %] 98 %  BP: ()/(53-69) 109/53     Weight: 69.8 kg (153 lb 14.1 oz)  Body mass index is 24.84 kg/m².      Intake/Output Summary (Last 24 hours) at 1/23/2023 1120  Last data filed at 1/23/2023 0835  Gross per 24 hour   Intake --   Output 1400 ml   Net -1400 ml       Physical Exam  Vitals reviewed.   Constitutional:       General: She is awake. She is not in acute distress.     Appearance: She is well-developed.      Comments: Appears weak and chronically ill, semi-upright in bed on NC on NAD   Cardiovascular:      Pulses:           Radial pulses are 2+ on the right side and 2+ on the left side.      Comments: BLE edema improved  Pulmonary:      Effort: Pulmonary effort is normal.      Breath sounds: Normal breath sounds.      Comments: On NC  Abdominal:      General: There is no distension.      Palpations: Abdomen is soft.   Musculoskeletal:      Comments: FLYNN, generally weak    Skin:     General: Skin is warm and dry.   Neurological:      General: No focal deficit present.      Mental Status: She is alert.   Psychiatric:         Behavior: Behavior is cooperative.         Vents:  Oxygen Concentration (%): 28 (01/23/23 0824)    Lines/Drains/Airways        Drain  Duration             Female External Urinary Catheter 01/21/23 1900 1 day              Peripheral Intravenous Line  Duration                  Peripheral IV - Single Lumen 01/19/23 2044 18 G Left Antecubital 3 days                    Significant Labs:    CBC/Anemia Profile:  Recent Labs   Lab 01/22/23  0553   WBC 7.36   HGB 9.7*   HCT 31.1*      MCV 84   RDW 15.3*        Chemistries:  Recent Labs   Lab 01/22/23  0552      K 3.8   CL 99   CO2 27   BUN 23   CREATININE 1.0   CALCIUM 8.8   ALBUMIN 3.0*   PROT 6.1   BILITOT 1.1*   ALKPHOS 91   ALT 13   AST 12       All pertinent labs within the past 24 hours have been reviewed.    Significant Imaging:  I have reviewed all pertinent imaging results/findings within the past 24 hours.      ABG  Recent Labs   Lab 01/20/23  1553   PH 7.478*   PO2 61*   PCO2 38.4   HCO3 28.5*   BE 5     Assessment/Plan:     * New onset of congestive heart failure  Patient is identified as having acute systolic heart failure. CHF is currently uncontrolled as evidenced by orthopnea and rales / crackles on pulmonary exam. Last BNP 2511. Patient is on CHF Pathway.    Latest ECHO on January 20, 2023 performed and demonstrates:  · The left ventricle is moderately enlarged with concentric hypertrophy and severely decreased systolic function.  · The estimated ejection fraction is 25%.  · Grade II left ventricular diastolic dysfunction.  · There is severe left ventricular global hypokinesis.  · Normal right ventricular size with normal right ventricular systolic function.  · There is mild-to-moderate aortic valve stenosis.  · Aortic valve area is 0.88 cm2; peak velocity is 2.49 m/s; mean gradient is 17 mmHg.  · Moderate mitral regurgitation. Moderate tricuspid regurgitation.  · Elevated central venous pressure (15 mmHg).  · The estimated PA systolic pressure is 61 mmHg. There is pulmonary hypertension.  · Trivial circumferential pericardial effusion. Effusion is fluid.    - continue  diuresis  - continue telemetry and monitor clinical / hemodynamics status closely  - monitor strict I&Os with daily weights; continue fluid restriction of 1.5L / daily  - Cardiology following and managing, [facility] 1/22 with: EF 20%, 3+ MR, and diffuse CAD that is being medically managed above  - wean supplemental O2 as able for sat of 92% or better    Shortness of breath  - suspect mostly s/t newly found advanced HF, resp status much improved with volume mgmt   - will need f/u in the pulmonary clinic for documented underlying COPD on Trelegy that was followed per Dr. Leger before his residential   - wheezing essentially resolved   - continue scheduled nebs  - continue Pulmicort and Brovana  - continue supplemental oxygen to target goal SpO2 > 90%      Pulmonary team will remain available. Please call if we can be of assistance sooner or if questions should arise.       Liam Christianson, NP  Pulmonology  [facility]

## 2023-01-23 NOTE — PROGRESS NOTES
[facility]  Cardiology  Progress Note    Patient Name: Violeta Scherer  MRN: [id number]  Admission Date: 1/19/2023  Hospital Length of Stay: 4 days  Code Status: Full Code   Attending Physician: Roberto Perkins, *   Primary Care Physician: Karrie Birmingham MD  Expected Discharge Date:   Principal Problem:New onset of congestive heart failure    Subjective:     Hospital Course:   1/21/23-Patient stable and plan cath tomorrow    1/22/23-Patient seen and examined in room post [facility]. Diffuse CAD and medical managemt. No chest pain.  Plan GDMT. Family aware and are in agreement.    1/23/23 Pt seen and examined today, s/p LHC, right groin site C/D/I palpable pulses. Restrictions explained. All questions answered. Denies CP at this time, breathing ok on NC. Labs reviewed stable. Echo 1/2/23 EF 25%          Review of Systems   Constitutional: Negative.   HENT: Negative.     Eyes: Negative.    Cardiovascular: Negative.    Respiratory:  Positive for shortness of breath.    Skin: Negative.    Musculoskeletal: Negative.    Gastrointestinal: Negative.    Genitourinary: Negative.    Neurological: Negative.    Psychiatric/Behavioral: Negative.     Objective:     Vital Signs (Most Recent):  Temp: 97.6 °F (36.4 °C) (01/23/23 1152)  Pulse: 97 (01/23/23 1206)  Resp: 16 (01/23/23 1206)  BP: (!) 110/56 (01/23/23 1152)  SpO2: (!) 92 % (01/23/23 1206)   Vital Signs (24h Range):  Temp:  [97.6 °F (36.4 °C)-98.6 °F (37 °C)] 97.6 °F (36.4 °C)  Pulse:  [90-97] 97  Resp:  [14-20] 16  SpO2:  [92 %-99 %] 92 %  BP: ()/(53-69) 110/56     Weight: 69.8 kg (153 lb 14.1 oz)  Body mass index is 24.84 kg/m².     SpO2: (!) 92 %         Intake/Output Summary (Last 24 hours) at 1/23/2023 1537  Last data filed at 1/23/2023 0835  Gross per 24 hour   Intake --   Output 600 ml   Net -600 ml       Lines/Drains/Airways       Drain  Duration             Female External Urinary Catheter 01/21/23 1900 1 day              Peripheral Intravenous Line   Duration                  Peripheral IV - Single Lumen 01/19/23 2044 18 G Left Antecubital 3 days                    Physical Exam  Vitals and nursing note reviewed.   Constitutional:       Appearance: Normal appearance.   HENT:      Head: Normocephalic.   Eyes:      Pupils: Pupils are equal, round, and reactive to light.   Cardiovascular:      Rate and Rhythm: Normal rate and regular rhythm.      Heart sounds: Normal heart sounds, S1 normal and S2 normal. No murmur heard.    No S3 or S4 sounds.   Pulmonary:      Effort: Pulmonary effort is normal.      Breath sounds: Normal breath sounds.   Abdominal:      General: Bowel sounds are normal.      Palpations: Abdomen is soft.   Musculoskeletal:         General: Normal range of motion.      Cervical back: Normal range of motion.   Skin:     Capillary Refill: Capillary refill takes less than 2 seconds.   Neurological:      General: No focal deficit present.      Mental Status: She is alert and oriented to person, place, and time.   Psychiatric:         Mood and Affect: Mood normal.         Behavior: Behavior normal.         Thought Content: Thought content normal.       Significant Labs: BMP:   Recent Labs   Lab 01/22/23  0552 01/23/23  1116    127*    137   K 3.8 4.0   CL 99 97   CO2 27 26   BUN 23 19   CREATININE 1.0 1.0   CALCIUM 8.8 9.1   , CMP   Recent Labs   Lab 01/22/23  0552 01/23/23  1116    137   K 3.8 4.0   CL 99 97   CO2 27 26    127*   BUN 23 19   CREATININE 1.0 1.0   CALCIUM 8.8 9.1   PROT 6.1  --    ALBUMIN 3.0*  --    BILITOT 1.1*  --    ALKPHOS 91  --    AST 12  --    ALT 13  --    ANIONGAP 11 14   , CBC   Recent Labs   Lab 01/22/23  0553 01/23/23  1116   WBC 7.36 8.60   HGB 9.7* 10.9*   HCT 31.1* 34.7*    293   , INR No results for input(s): INR, PROTIME in the last 48 hours., Lipid Panel No results for input(s): CHOL, HDL, LDLCALC, TRIG, CHOLHDL in the last 48 hours., Troponin No results for input(s): TROPONINI in the  last 48 hours., and All pertinent lab results from the last 24 hours have been reviewed.    Significant Imaging: Cardiac Cath: reviewed, Echocardiogram: Transthoracic echo (TTE) complete (Cupid Only):   Results for orders placed or performed during the hospital encounter of 01/19/23   Echo   Result Value Ref Range    BSA 1.81 m2    TDI SEPTAL 0.09 m/s    LV LATERAL E/E' RATIO 15.75 m/s    LV SEPTAL E/E' RATIO 14.00 m/s    LA WIDTH 3.80 cm    IVC diameter 1.69 cm    Left Ventricular Outflow Tract Mean Velocity 0.63 cm/s    Left Ventricular Outflow Tract Mean Gradient 1.70 mmHg    TDI LATERAL 0.08 m/s    LVIDd 5.08 3.5 - 6.0 cm    IVS 1.51 (A) 0.6 - 1.1 cm    Posterior Wall 1.40 (A) 0.6 - 1.1 cm    Ao root annulus 2.46 cm    LVIDs 4.49 (A) 2.1 - 4.0 cm    FS 12 28 - 44 %    LA volume 47.84 cm3    Sinus 2.41 cm    STJ 2.53 cm    Ascending aorta 2.70 cm    LV mass 315.92 g    LA size 3.52 cm    TAPSE 1.78 cm    Left Ventricle Relative Wall Thickness 0.55 cm    AV mean gradient 17 mmHg    AV valve area 0.88 cm2    AV Velocity Ratio 0.30     AV index (prosthetic) 0.37     MV valve area p 1/2 method 3.23 cm2    E/A ratio 1.19     Mean e' 0.09 m/s    E wave deceleration time 234.50 msec    IVRT 62.80 msec    LVOT diameter 1.74 cm    LVOT area 2.4 cm2    LVOT peak sarmad 0.74 m/s    LVOT peak VTI 17.50 cm    Ao peak sarmad 2.49 m/s    Ao VTI 47.1 cm    RVOT peak sarmad 0.82 m/s    RVOT peak VTI 16.4 cm    Mr max sarmad 5.20 m/s    LVOT stroke volume 41.59 cm3    AV peak gradient 25 mmHg    PV mean gradient 2.05 mmHg    E/E' ratio 14.82 m/s    MV Peak E Sarmad 1.26 m/s    TR Max Sarmad 3.40 m/s    MV stenosis pressure 1/2 time 68.01 ms    MV Peak A Sarmad 1.06 m/s    LV Systolic Volume 91.93 mL    LV Systolic Volume Index 51.4 mL/m2    LV Diastolic Volume 122.55 mL    LV Diastolic Volume Index 68.46 mL/m2    LA Volume Index 26.7 mL/m2    LV Mass Index 176 g/m2    RA Major Axis 4.22 cm    Left Atrium Minor Axis 4.31 cm    Left Atrium Major Axis  4.11 cm    Triscuspid Valve Regurgitation Peak Gradient 46 mmHg    RA Width 3.03 cm    Right Atrial Pressure (from IVC) 15 mmHg    EF 25 %    TV rest pulmonary artery pressure 61 mmHg    Narrative    · The left ventricle is moderately enlarged with concentric hypertrophy   and severely decreased systolic function.  · The estimated ejection fraction is 25%.  · Grade II left ventricular diastolic dysfunction.  · There is severe left ventricular global hypokinesis.  · Normal right ventricular size with normal right ventricular systolic   function.  · There is mild-to-moderate aortic valve stenosis.  · Aortic valve area is 0.88 cm2; peak velocity is 2.49 m/s; mean gradient   is 17 mmHg.  · Moderate mitral regurgitation.  · Moderate tricuspid regurgitation.  · Elevated central venous pressure (15 mmHg).  · The estimated PA systolic pressure is 61 mmHg.  · There is pulmonary hypertension.  · Trivial circumferential pericardial effusion. Effusion is fluid.      , EKG: reviewed, Stress Test: reviewed, and X-Ray: CXR: X-Ray Chest 1 View (CXR): No results found for this visit on 01/19/23.    Assessment and Plan:       * New onset of congestive heart failure  Patient is identified as having Systolic (HFrEF) heart failure that is Acute. CHF is currently uncontrolled due to Continued edema of extremities. Latest ECHO performed and demonstrates- Results for orders placed during the hospital encounter of 01/19/23    Echo    Interpretation Summary  · The left ventricle is moderately enlarged with concentric hypertrophy and severely decreased systolic function.  · The estimated ejection fraction is 25%.  · Grade II left ventricular diastolic dysfunction.  · There is severe left ventricular global hypokinesis.  · Normal right ventricular size with normal right ventricular systolic function.  · There is mild-to-moderate aortic valve stenosis.  · Aortic valve area is 0.88 cm2; peak velocity is 2.49 m/s; mean gradient is 17 mmHg.  ·  Moderate mitral regurgitation.  · Moderate tricuspid regurgitation.  · Elevated central venous pressure (15 mmHg).  · The estimated PA systolic pressure is 61 mmHg.  · There is pulmonary hypertension.  · Trivial circumferential pericardial effusion. Effusion is fluid.  . Continue Furosemide and monitor clinical status closely. Monitor on telemetry. Patient is on CHF pathway.  Monitor strict Is&Os and daily weights.  Place on fluid restriction of 2 L. Continue to stress to patient importance of self efficacy and  on diet for CHF. Last BNP reviewed- and noted below   Recent Labs   Lab 01/19/23  2341   BNP 2,511*   .  Plan continued diuresis and plan [facility]    1/22/13- add losartan     1/23/23  Cont OMT ARB, lasix  Not on BB due to respiratory status, low Bps in hospital  [facility]  Life[facility]est ordered      NSTEMI (non-ST elevated myocardial infarction)  Continue heparin and ASA  Plan [facility] when feasible    1/22/23  Mild diffuse CAD, medical mangement    1/23/23  Follow up with Cardiologist, saw [facility] previously but would like to see someone at [facility], per daughter    Elevated troponin  NSTEMI, inferior EKG changes and evidence of diffuse LV dysfunction by echo  Continue Heparin IV  ASA  Plan [facility] when able to lie flat  Now with CHF and edema    Shortness of breath  See CHF  Unable to lie flat at this time    1/21/23  Stable and able to lie flat today    1/22/23  Post cath stable, add losartan    1/23/23  Breathing improving, on NC    History of pulmonary embolus (PE)  Stable, continue eliquis        VTE Risk Mitigation (From admission, onward)           Ordered     apixaban tablet 2.5 mg  2 times daily         01/22/23 1108     Reason for No Pharmacological VTE Prophylaxis  Once        Question:  Reasons:  Answer:  Physician Provided (leave comment)  Comment:  on heparin drip    01/19/23 2246     IP VTE HIGH RISK PATIENT  Once         01/19/23 2246     Place sequential compression device  Until discontinued          01/19/23 2246                    Araceli Gaona NP  Cardiology  [facility]

## 2023-01-23 NOTE — PLAN OF CARE
Pt remains free from falls/injuries this shift. Safety precautions maintained. Pt SOB upon exertion, 4L NC. Chart check completed.   Problem: Adult Inpatient Plan of Care  Goal: Plan of Care Review  Outcome: Ongoing, Progressing  Goal: Patient-Specific Goal (Individualized)  Outcome: Ongoing, Progressing  Goal: Absence of Hospital-Acquired Illness or Injury  Outcome: Ongoing, Progressing  Goal: Optimal Comfort and Wellbeing  Outcome: Ongoing, Progressing  Goal: Readiness for Transition of Care  Outcome: Ongoing, Progressing     Problem: Fall Injury Risk  Goal: Absence of Fall and Fall-Related Injury  Outcome: Ongoing, Progressing

## 2023-01-23 NOTE — PLAN OF CARE
Discussed poc with pt, pt verbalized understanding     Purposeful rounding every 2hours     Blood glucose monitoring   Fall precautions in place, remains injury free  Pt denies c/o nausea  Pain still being managed  with PRN meds     Accurate I&Os  Bed locked at lowest position  Call light within reach  Telemetry in place and intact     Chart check complete  Reviewed active orders  Will continue with POC

## 2023-01-24 ENCOUNTER — DOCUMENTATION ONLY (OUTPATIENT)
Dept: CARDIOLOGY | Facility: CLINIC | Age: 73
End: 2023-01-24
Payer: MEDICARE

## 2023-01-24 VITALS
HEIGHT: 66 IN | BODY MASS INDEX: 24.73 KG/M2 | OXYGEN SATURATION: 95 % | TEMPERATURE: 98 F | RESPIRATION RATE: 20 BRPM | SYSTOLIC BLOOD PRESSURE: 135 MMHG | HEART RATE: 90 BPM | WEIGHT: 153.88 LBS | DIASTOLIC BLOOD PRESSURE: 68 MMHG

## 2023-01-24 LAB
ANION GAP SERPL CALC-SCNC: 12 MMOL/L (ref 8–16)
BASOPHILS # BLD AUTO: 0.03 K/UL (ref 0–0.2)
BASOPHILS NFR BLD: 0.4 % (ref 0–1.9)
BUN SERPL-MCNC: 19 MG/DL (ref 8–23)
CALCIUM SERPL-MCNC: 8.7 MG/DL (ref 8.7–10.5)
CHLORIDE SERPL-SCNC: 95 MMOL/L (ref 95–110)
CO2 SERPL-SCNC: 26 MMOL/L (ref 23–29)
CREAT SERPL-MCNC: 0.9 MG/DL (ref 0.5–1.4)
DIFFERENTIAL METHOD: ABNORMAL
EOSINOPHIL # BLD AUTO: 0.1 K/UL (ref 0–0.5)
EOSINOPHIL NFR BLD: 1.1 % (ref 0–8)
ERYTHROCYTE [DISTWIDTH] IN BLOOD BY AUTOMATED COUNT: 15.2 % (ref 11.5–14.5)
EST. GFR  (NO RACE VARIABLE): >60 ML/MIN/1.73 M^2
GLUCOSE SERPL-MCNC: 111 MG/DL (ref 70–110)
HCT VFR BLD AUTO: 30.7 % (ref 37–48.5)
HGB BLD-MCNC: 9.5 G/DL (ref 12–16)
IMM GRANULOCYTES # BLD AUTO: 0.02 K/UL (ref 0–0.04)
IMM GRANULOCYTES NFR BLD AUTO: 0.3 % (ref 0–0.5)
LYMPHOCYTES # BLD AUTO: 0.9 K/UL (ref 1–4.8)
LYMPHOCYTES NFR BLD: 12.9 % (ref 18–48)
MCH RBC QN AUTO: 26.3 PG (ref 27–31)
MCHC RBC AUTO-ENTMCNC: 30.9 G/DL (ref 32–36)
MCV RBC AUTO: 85 FL (ref 82–98)
MONOCYTES # BLD AUTO: 0.7 K/UL (ref 0.3–1)
MONOCYTES NFR BLD: 9.4 % (ref 4–15)
NEUTROPHILS # BLD AUTO: 5.4 K/UL (ref 1.8–7.7)
NEUTROPHILS NFR BLD: 75.9 % (ref 38–73)
NRBC BLD-RTO: 0 /100 WBC
PLATELET # BLD AUTO: 257 K/UL (ref 150–450)
PMV BLD AUTO: 10.5 FL (ref 9.2–12.9)
POCT GLUCOSE: 107 MG/DL (ref 70–110)
POCT GLUCOSE: 117 MG/DL (ref 70–110)
POTASSIUM SERPL-SCNC: 4.1 MMOL/L (ref 3.5–5.1)
RBC # BLD AUTO: 3.61 M/UL (ref 4–5.4)
SODIUM SERPL-SCNC: 133 MMOL/L (ref 136–145)
WBC # BLD AUTO: 7.14 K/UL (ref 3.9–12.7)

## 2023-01-24 PROCEDURE — 27000221 HC OXYGEN, UP TO 24 HOURS

## 2023-01-24 PROCEDURE — 97165 OT EVAL LOW COMPLEX 30 MIN: CPT

## 2023-01-24 PROCEDURE — 97161 PT EVAL LOW COMPLEX 20 MIN: CPT

## 2023-01-24 PROCEDURE — 25000003 PHARM REV CODE 250: Performed by: HOSPITALIST

## 2023-01-24 PROCEDURE — 94799 UNLISTED PULMONARY SVC/PX: CPT

## 2023-01-24 PROCEDURE — 99900035 HC TECH TIME PER 15 MIN (STAT)

## 2023-01-24 PROCEDURE — 94618 PULMONARY STRESS TESTING: CPT

## 2023-01-24 PROCEDURE — 97530 THERAPEUTIC ACTIVITIES: CPT

## 2023-01-24 PROCEDURE — 25000003 PHARM REV CODE 250: Performed by: FAMILY MEDICINE

## 2023-01-24 PROCEDURE — 25000003 PHARM REV CODE 250: Performed by: INTERNAL MEDICINE

## 2023-01-24 PROCEDURE — 36415 COLL VENOUS BLD VENIPUNCTURE: CPT | Performed by: INTERNAL MEDICINE

## 2023-01-24 PROCEDURE — 80048 BASIC METABOLIC PNL TOTAL CA: CPT | Performed by: INTERNAL MEDICINE

## 2023-01-24 PROCEDURE — 94761 N-INVAS EAR/PLS OXIMETRY MLT: CPT

## 2023-01-24 PROCEDURE — 85025 COMPLETE CBC W/AUTO DIFF WBC: CPT | Performed by: INTERNAL MEDICINE

## 2023-01-24 PROCEDURE — 94640 AIRWAY INHALATION TREATMENT: CPT

## 2023-01-24 PROCEDURE — 25000242 PHARM REV CODE 250 ALT 637 W/ HCPCS: Performed by: HOSPITALIST

## 2023-01-24 PROCEDURE — 99232 SBSQ HOSP IP/OBS MODERATE 35: CPT | Mod: ,,,

## 2023-01-24 PROCEDURE — 99232 PR SUBSEQUENT HOSPITAL CARE,LEVL II: ICD-10-PCS | Mod: ,,,

## 2023-01-24 PROCEDURE — 25000242 PHARM REV CODE 250 ALT 637 W/ HCPCS: Performed by: INTERNAL MEDICINE

## 2023-01-24 RX ORDER — FUROSEMIDE 20 MG/1
20 TABLET ORAL DAILY
Qty: 30 TABLET | Refills: 1 | Status: SHIPPED | OUTPATIENT
Start: 2023-01-24 | End: 2023-05-02

## 2023-01-24 RX ORDER — METOPROLOL SUCCINATE 25 MG/1
12.5 TABLET, EXTENDED RELEASE ORAL NIGHTLY
Qty: 45 TABLET | Refills: 1 | Status: SHIPPED | OUTPATIENT
Start: 2023-01-24 | End: 2024-01-24

## 2023-01-24 RX ORDER — POTASSIUM CHLORIDE 20 MEQ/1
20 TABLET, EXTENDED RELEASE ORAL EVERY OTHER DAY
Qty: 30 TABLET | Refills: 1 | Status: SHIPPED | OUTPATIENT
Start: 2023-01-24

## 2023-01-24 RX ORDER — ASPIRIN 81 MG/1
81 TABLET ORAL DAILY
Qty: 90 TABLET | Refills: 3 | Status: SHIPPED | OUTPATIENT
Start: 2023-01-25 | End: 2024-01-25

## 2023-01-24 RX ORDER — LOSARTAN POTASSIUM 25 MG/1
12.5 TABLET ORAL DAILY
Qty: 45 TABLET | Refills: 1 | Status: SHIPPED | OUTPATIENT
Start: 2023-01-25 | End: 2023-01-27

## 2023-01-24 RX ADMIN — POTASSIUM CHLORIDE 20 MEQ: 1500 TABLET, EXTENDED RELEASE ORAL at 10:01

## 2023-01-24 RX ADMIN — IPRATROPIUM BROMIDE AND ALBUTEROL SULFATE 3 ML: 2.5; .5 SOLUTION RESPIRATORY (INHALATION) at 07:01

## 2023-01-24 RX ADMIN — ESCITALOPRAM OXALATE 10 MG: 10 TABLET ORAL at 10:01

## 2023-01-24 RX ADMIN — BUDESONIDE 0.5 MG: 0.5 INHALANT ORAL at 07:01

## 2023-01-24 RX ADMIN — IPRATROPIUM BROMIDE AND ALBUTEROL SULFATE 3 ML: 2.5; .5 SOLUTION RESPIRATORY (INHALATION) at 12:01

## 2023-01-24 RX ADMIN — PANTOPRAZOLE SODIUM 40 MG: 40 TABLET, DELAYED RELEASE ORAL at 10:01

## 2023-01-24 RX ADMIN — ARFORMOTEROL TARTRATE 15 MCG: 15 SOLUTION RESPIRATORY (INHALATION) at 07:01

## 2023-01-24 RX ADMIN — APIXABAN 2.5 MG: 2.5 TABLET, FILM COATED ORAL at 10:01

## 2023-01-24 RX ADMIN — ASPIRIN 81 MG: 81 TABLET, COATED ORAL at 10:01

## 2023-01-24 RX ADMIN — IPRATROPIUM BROMIDE AND ALBUTEROL SULFATE 3 ML: 2.5; .5 SOLUTION RESPIRATORY (INHALATION) at 01:01

## 2023-01-24 NOTE — PLAN OF CARE
[facility]  Discharge Final Note    Primary Care Provider: Karrie Birmingham MD    Expected Discharge Date: 1/24/2023    Final Discharge Note (most recent)       Final Note - 01/24/23 1459          Final Note    Assessment Type Final Discharge Note     Anticipated Discharge Disposition Home or Self Care     Hospital Resources/Appts/Education Provided Appointments scheduled and added to AVS        Post-Acute Status    Post-Acute Authorization HME     HME Status Set-up Complete/Auth obtained     Coverage [employer]     Discharge Delays None known at this time                     Important Message from Medicare  Important Message from Medicare regarding Discharge Appeal Rights: Given to patient/caregiver, Explained to patient/caregiver, Signed/date by patient/caregiver     Date IMM was signed: 01/24/23  Time IMM was signed: 1423    Contact Info       Karrie Birmingham MD   Specialty: Family Medicine   Relationship: PCP - General    [id number]  [place] [address]   Phone: [phone]       Next Steps: Follow up in 1 week(s)          DC Disposition: Home with family  Family notified: Patients and daughters  Transportation: family    Case Management got Life Vest set up. Patient was being fitted this afternoon.  Jamesr messaged Melissa, [facility], for home O2. Brandi gave approval to pull O2 tanks and deliver to Pt.     Patient has new patient appointment scheduled 1/27/23 - 10:30 am.  Patient has ambulatory Referral to [facility].

## 2023-01-24 NOTE — RESPIRATORY THERAPY
Home Oxygen Evaluation - [facility] - Cardiopulmonary Department      Date Performed: 2023      1) Patient's Home O2 Sat on room air, while at rest: Room Air SpO2 At Rest: (!) 82 %        If O2 sats on room air at rest are 88% or below, patient qualifies.  Document O2 liter flow needed in Step 2.  If O2 sats are 89% or above, complete Step 3.        2)  If patient is not ambulated and O2 sats are <88%, what is the O2 liter flow required to meet ordered saturation?      If O2 sats on room air while exercising remain 89% or above patient does not qualify, no further testing needed Document N/A in step 3. If O2 sats on room air while exercising are 88% or below, continue to Step 4.    3) Patient's O2 Sat on room air while exercisin) Patient's O2 Sat while exercising on O2: SpO2 During Ambulation on O2: 93 % at Ambulation O2 LPM: 4 LPM         (Must show improvement from #4 for patients to qualify)

## 2023-01-24 NOTE — DISCHARGE SUMMARY
[facility] Medicine  Discharge Summary      Patient Name: Violeta Scherer  MRN: [id number]  HOWARD: [id number]  Patient Class: IP- Inpatient  Admission Date: 1/19/2023  Hospital Length of Stay: 5 days  Discharge Date and Time:  01/24/2023 12:21 PM  Attending Physician: Roberto Perkins, *   Discharging Provider: Roberto Perkins MD  Primary Care Provider: Karrie Birmingham MD    Primary Care Team: Networked reference to record PCT     HPI:   Violeta Scherer is a 72 y.o. female with a PMH  has a past medical history of Hypertension, Other pulmonary embolism without acute cor pulmonale, and Stroke. who presented to the ED for further evaluation of progressive worsening shortness of breath x2 days duration.  Patient reportedly had acute onset and progressively worsening shortness of breath over the past 3 days with patient now reporting 3-4 pillow orthopnea, PND, dyspnea on minimal exertion, and bilateral lower extremity pitting edema.  Patient denies history of heart failure, COPD, of prior MIs but did have history of previous stroke/TIA and PE's.  Aggravating factors included those noted above with no known alleviating factors.  All other review of systems negative including for lightheadedness, dizziness, headache, visual changes, fever, chills, sweats, nausea, vomiting, chest pain, abdominal pain, dysuria, hematuria, melena, hematochezia, constipation, diarrhea, or onset neurological deficits.  Prior to onset of symptoms, patient reported being in her usual state health no other concerns or complaints.  Initial workup in the ED revealed patient to have evidence consistent with signs/symptoms of new onset CHF and NSTEMI.  Hospital Medicine consulted by ED staff for admission for continued medical management.      PCP: Karrie Birmingham        Procedure(s) (LRB):  CATHETERIZATION, HEART, LEFT (Left)      Hospital Course:   1/20 admitted for new onset congestive heart failure exacerbation.  Echocardiogram with ejection fraction 25%. Cardiology consulted and diuresing. Family endorses orthopnea, increased lower extremity edema. Daughter reports patient did not want to come to hospital. Attempted goals of care discussion but patient unable to participate. Critical care consult. Abg pending  1/21 abg within normal limits. Continue monitoring need for nippv. Diuresing well with intravenous lasix. Tachypnea improving. Disoriented at baseline per daughter. Daughter states baclofen causing drowsiness  1/22 status post [facility]. Tolerated well. Remains dyspneic. Continue diuresing. Optimize medication(s) and breathing with diuretics, nitroglycerin, oxygen evaluation. Anticipate discharge 1-2 days.  1/23 S/P  LHC show :Diffuse CAD and medical management. Case D/W Cardiology which rec life vest . NAEON . Denies any new complains .   1/24 Pt was seen and examined at bedside . She was determined to be suitable for d/c . She qulify for home o2 with a o2 sat < 88 % at rest . She will be d/c on losartan , metoprolol , asa , statin and lasix .  Life vest at bedside . She was advised to f/u with her PCP , cardiology and pulmonology ion 1 to 2 weeks .       Goals of Care Treatment Preferences:  Code Status: Full Code      Consults:   Consults (From admission, onward)        Status Ordering Provider     Inpatient consult to Social Work/Case Management  Once        Provider:  (Not yet assigned)    Completed JULIA SPRINGER     Inpatient consult to Critical Care Medicine  Once        Provider:  (Not yet assigned)    Completed JANINE LANDEROS     Inpatient consult to Cardiology  Once        Provider:  (Not yet assigned)    Completed JANINE LANDEROS          * New onset of congestive heart failure  Patient is identified as having heart failure that is new onset. CHF is currently uncontrolled due to Continued edema of extremities, >3 pillow orthopnea, Rales/crackles on pulmonary exam and Pulmonary edema/pleural effusion on CXR.  Latest ECHO performed and demonstrates- No results found for this or any previous visit.  . Continue Beta Blocker and Furosemide and monitor clinical status closely. Monitor on telemetry. Patient is on CHF pathway.  Monitor strict Is&Os and daily weights.  Place on fluid restriction of 1.5 L. Continue to stress to patient importance of self efficacy and  on diet for CHF. Last BNP reviewed- and noted below   Recent Labs   Lab 01/19/23  2341   BNP 2,511*     Intravenous diuresising  Cardiology consulted  Critical care consult for higher level of care  abg pending  If hypercapnic, transfer to icu for nippv    1/21  Awaiting euvolemic/compensated for lhc    1/22  Continue diuresing  Ambulatory oxygen evaluation closer to discharge       GERD (gastroesophageal reflux disease)  .Chronic. Stable. Currently asymptomatic. Home medications include PPI/Antacids as needed.  Plan:  -Continue PPI/Antacids as needed       Depression  Chronic. Stable. Not in acute exacerbation and currently denies endorsing any suicidal/homicidal ideations.   Plan:  -Continue home medications       HLD (hyperlipidemia)  Patient is chronically on statin.will continue for now. Last Lipid Panel:   Lab Results   Component Value Date    CHOL 132 01/20/2023    HDL 45 01/20/2023    LDLCALC 75.8 01/20/2023    TRIG 56 01/20/2023    CHOLHDL 34.1 01/20/2023     Plan:  -Continue home medication  Lipid panel within normal limits         NSTEMI (non-ST elevated myocardial infarction)  Ischemic evaluation or lhc per cardiology when more compensated     Status post [facility] with diffuse disease  Optimize medication(s) and discharge home with follow up cardiology     Elevated troponin  Patient is doing found to have elevated troponin measuring 0.745 with repeat 0.758.  EKG shows sinus tachycardia with T-wave inversions in inferior and anterior lateral leads. Patient without evidence of chest pain does show cardiomyopathy with presence of new onset heart failure.   May be secondary to demand ischemia but treating as NSTEMI in initiated on heparin.  Cardiology consulted by ED staff and aware of patient and awaiting further evaluation in the morning.  Recommended administering aspirin, statin, and beta-blocker which was provided with the exception of beta-blocker due to possible new onset heart failure and provided     1/21  Cardiology following  On heparin infusion  Planning for ischemic evaluation or lhc when able    Status post lhc with diffuse disease  Optimize medical management and follow up outpatient cardiology     Shortness of breath  Patient presented with worsening dyspnea/shortness of breath as noted above likely secondary to new onset CHF.  Patient currently saturating % on room air.  Respiratory rate ranging from 17-26.  She remains afebrile without leukocytosis with a/HI baseline currently measuring 10.8/34.6.  BNP elevated at 1940 with chest x-ray revealing mild cardiomegaly with mild perihilar interstitial opacity and this likely trace pleural effusions or pleural thickening.  D-dimer not obtained in the ED.  History of PE in the past but not severely hypoxic or tachycardic with tachypnea improving following diuresing.  Patient provided 20 mg IV Lasix x1.  Cardiology consulted and following patient.  Plan:  See congestive heart failure plan      History of pulmonary embolus (PE)  On heparin gtt     Status post lhc  Discontinue heparin gtt  Resume home a/c    History of CVA (cerebrovascular accident)  Patient with history remote CVA and PE proximally 4 years prior to admission previously treated with Eliquis.  Patient does present with some concerns of recurrent PE but has likely cause of shortness a breath, tachypnea, and hypoxia given CHF exacerbation.  We will obtain D-dimer as it was not obtained in the ED and consider CTA for definitive rule out of PE given history and presentation.  Currently on heparin drip for treatment of NSTEMI with cardiology  following and awaiting further evaluation/recommendations.  Plan:  Does not wear supplemental oxygen at baseline  -reat CHF exacerbation and NSTEMI as noted above  elevated d-dimer  vq scan low prob pulmonary embolism     1/22  Disoriented at baseline  Consider homehealth physical/occupational therapy upon discharge given underlying cognitive impairment      Primary hypertension  Controlled  No antihtn medication(s) on home medication(s) list  Losartan started   Intravenous diuresis  IV hydralazine prn for SBP>160 or DBP>90       1/21  Hypotension  Discontinue losartan  Reduce lasix dose    1/22  Continue monitoring need for antihtn medication(s) while diuresing       Final Active Diagnoses:    Diagnosis Date Noted POA    PRINCIPAL PROBLEM:  New onset of congestive heart failure [I50.9] 01/20/2023 Yes    Primary hypertension [I10] 01/20/2023 Yes    History of CVA (cerebrovascular accident) [Z86.73] 01/20/2023 Not Applicable    History of pulmonary embolus (PE) [Z86.711] 01/20/2023 Yes    Shortness of breath [R06.02] 01/20/2023 Yes    Elevated troponin [R77.8] 01/20/2023 Yes    NSTEMI (non-ST elevated myocardial infarction) [I21.4] 01/20/2023 Yes    HLD (hyperlipidemia) [E78.5] 01/20/2023 Yes    Depression [F32.A] 01/20/2023 Yes    GERD (gastroesophageal reflux disease) [K21.9] 01/20/2023 Yes      Problems Resolved During this Admission:       Discharged Condition: stable    Disposition: Home or Self Care    Follow Up:   Follow-up Information     April H MD Vinnie Follow up in 1 week(s).    Specialty: Family Medicine  Contact information:  [address]  [facility] [id number] [phone]                       Patient Instructions:      OXYGEN FOR HOME USE     Order Specific Question Answer Comments   Liter Flow 4    Duration Continuous    Qualifying Test Performed at: Rest    Oxygen saturation: 82    Portable mode: continuous    Route nasal cannula    Device: home concentrator with portable tanks  "   Length of need (in months): 99 mos    Patient condition with qualifying saturation COPD    Height: 5' 6" (1.676 m)    Weight: 69.8 kg (153 lb 14.1 oz)    Alternative treatment measures have been tried or considered and deemed clinically ineffective. Yes      Ambulatory referral/consult to [facility]   Standing Status: Future   Referral Priority: Routine Referral Type: Consultation   Referral Reason: Specialty Services Required   Number of Visits Requested: 1     Diet Cardiac     Activity as tolerated       Significant Diagnostic Studies: Labs:   BMP:   Recent Labs   Lab 01/23/23  1116 01/24/23  0518   * 111*    133*   K 4.0 4.1   CL 97 95   CO2 26 26   BUN 19 19   CREATININE 1.0 0.9   CALCIUM 9.1 8.7   , CMP   Recent Labs   Lab 01/23/23 1116 01/24/23 0518    133*   K 4.0 4.1   CL 97 95   CO2 26 26   * 111*   BUN 19 19   CREATININE 1.0 0.9   CALCIUM 9.1 8.7   ANIONGAP 14 12    and CBC   Recent Labs   Lab 01/23/23 1116 01/24/23 0518   WBC 8.60 7.14   HGB 10.9* 9.5*   HCT 34.7* 30.7*    257     Microbiology: Blood Culture No results found for: LABBLOO    Pending Diagnostic Studies:     None         Medications:  Reconciled Home Medications:      Medication List      START taking these medications    aspirin 81 MG EC tablet  Commonly known as: ECOTRIN  Take 1 tablet (81 mg total) by mouth once daily.  Start taking on: January 25, 2023     furosemide 20 MG tablet  Commonly known as: LASIX  Take 1 tablet (20 mg total) by mouth once daily.     losartan 25 MG tablet  Commonly known as: COZAAR  Take 0.5 tablets (12.5 mg total) by mouth once daily.  Start taking on: January 25, 2023     metoprolol succinate 25 MG 24 hr tablet  Commonly known as: TOPROL-XL  Take 0.5 tablets (12.5 mg total) by mouth every evening.     potassium chloride SA 20 MEQ tablet  Commonly known as: K-DUR,KLOR-CON  Take 1 tablet (20 mEq total) by mouth every other day.        CONTINUE taking these " medications    albuterol 90 mcg/actuation inhaler  Commonly known as: PROVENTIL/VENTOLIN HFA  Inhale into the lungs daily as needed.     atorvastatin 20 MG tablet  Commonly known as: LIPITOR  Take 20 mg by mouth once daily.     docusate sodium 100 MG capsule  Commonly known as: COLACE  Take 1 capsule (100 mg total) by mouth 2 (two) times daily as needed for Constipation.     EScitalopram oxalate 10 MG tablet  Commonly known as: LEXAPRO  Take 10 mg by mouth once daily.     fluticasone-umeclidin-vilanter 100-62.5-25 mcg Dsdv  Commonly known as: TRELEGY ELLIPTA  Inhale 1 puff into the lungs.     HYDROcodone-acetaminophen 7.5-325 mg per tablet  Commonly known as: NORCO  Take 1 tablet by mouth every 6 (six) hours as needed for Pain.     ondansetron 4 MG tablet  Commonly known as: ZOFRAN  Take 1 tablet (4 mg total) by mouth every 6 (six) hours.     pantoprazole 40 MG tablet  Commonly known as: PROTONIX  Take 40 mg by mouth 2 (two) times a day.        STOP taking these medications    apixaban 2.5 mg Tab  Commonly known as: ELIQUIS            Indwelling Lines/Drains at time of discharge:   Lines/Drains/Airways     Drain  Duration           Female External Urinary Catheter 01/21/23 1900 2 days                Time spent on the discharge of patient: 30 minutes         Roberto Perkins MD  Department of Hospital Medicine  [facility]

## 2023-01-24 NOTE — PLAN OF CARE
Discussed poc with pt, pt verbalized understanding     Purposeful rounding every 2hours     Blood glucose monitoring   Fall precautions in place, remains injury free  Pt denies c/o nausea  Pain still being managed  with PRN meds    Bed locked at lowest position  Call light within reach  Telemetry in place and intact     Chart check complete  Reviewed active orders  Will continue with POC  Waiting on lifevest delivery for DC

## 2023-01-24 NOTE — PLAN OF CARE
SEE EVAL FOR DETAILS. PT DISPLAYED MIN DEFICITS WITH ADL'S, FUNCTIONAL MOBILITY AND TRANSFERS. RECOMMEND : HOME HEALTH O.T.

## 2023-01-24 NOTE — PLAN OF CARE
MD let Cesar know that Patient needs Home O2 for DC. Jamesr found O2 orders and message Brandi at [facility]. Brandi sent approval, Swer was cleared to get cart and 2 tanks for Patient.    Swer went to [facility] and retrieved cart and 2 tanks. Swer delivered Tanks and card to patient's bedside.  Swer got signature for delivery acknowledgement and rental agreement.  Jamesr put paperwork back in DME office.

## 2023-01-24 NOTE — PLAN OF CARE
Problem: Adult Inpatient Plan of Care  Goal: Plan of Care Review  Outcome: Ongoing, Progressing     Problem: Fall Injury Risk  Goal: Absence of Fall and Fall-Related Injury  Outcome: Ongoing, Progressing     Problem: Fatigue  Goal: Improved Activity Tolerance  Outcome: Ongoing, Progressing     Problem: Adjustment to Illness (Heart Failure)  Goal: Optimal Coping  Outcome: Ongoing, Progressing     Problem: Fluid Imbalance (Heart Failure)  Goal: Fluid Balance  Outcome: Ongoing, Progressing     Problem: Functional Ability Impaired (Heart Failure)  Goal: Optimal Functional Ability  Outcome: Ongoing, Progressing     Problem: Respiratory Compromise (Heart Failure)  Goal: Effective Oxygenation and Ventilation  Outcome: Ongoing, Progressing     Problem: Sleep Disordered Breathing (Heart Failure)  Goal: Effective Breathing Pattern During Sleep  Outcome: Ongoing, Progressing     Problem: Infection COPD (Chronic Obstructive Pulmonary Disease)  Goal: Absence of Infection Signs and Symptoms  Outcome: Ongoing, Progressing     Problem: Respiratory Compromise COPD (Chronic Obstructive Pulmonary Disease)  Goal: Effective Oxygenation and Ventilation  Outcome: Ongoing, Progressing     Patient remains free from injury. Safety precautions maintained. No s/s of acute distress. Cardiac monitoring in place. Blood glucose monitoring and q4 neuro checks continued this shift. Chart and orders review completed. Pt education about care completed.

## 2023-01-24 NOTE — PT/OT/SLP EVAL
Occupational Therapy Evaluation and Treatment    Name: Violeta Scherer  MRN: [id number]  Admitting Diagnosis: New onset of congestive heart failure  Recent Surgery: Procedure(s) (LRB):  CATHETERIZATION, HEART, LEFT (Left) 2 Days Post-Op    Recommendations:     Discharge Recommendations: home health OT  Level of Assistance Recommended: Intermittent supervision  Discharge Equipment Recommendations: none  Barriers to discharge:      Assessment:     Violeta Scherer is a 72 y.o. female with a medical diagnosis of New onset of congestive heart failure. She presents with performance deficits affecting function including weakness, impaired functional mobility, decreased safety awareness, impaired endurance, gait instability, pain, impaired balance, impaired self care skills, decreased lower extremity function, decreased ROM.     Rehab Prognosis: Good; patient would benefit from acute skilled OT services to address these deficits and reach maximum level of function.    Plan:     Patient to be seen 2 x/week to address the above listed problems via self-care/home management, therapeutic activities, therapeutic exercises  Plan of Care Expires: 02/07/23  Plan of Care Reviewed with: patient    Subjective     Chief Complaint: Patient Comments/Goals:   Pain/Comfort:  Pain Rating 1: 0/10    Patients cultural, spiritual, Church conflicts given the current situation:      Social History:  Living Environment: Patient lives with their daughter in a single story home, number of outside stairs: 0 .  Prior Level of Function: Prior to admission, patient was independent with ADLs.  Roles and Routines: OCCUPATIONAL THERAPY  Equipment Used at Home:  cane, straight  DME owned (not currently used): rolling walker, single point cane, and QUAD CANE  Assistance Upon Discharge: family.    Objective:     Communicated with NURSE AND Epic CHART REVIEW prior to session. Patient found HOB elevated with   upon OT entry to room.    General Precautions:  Standard, fall   Orthopedic Precautions:N/A   Braces: N/A  Respiratory Status: Nasal cannula, flow 4 L/min    Occupational Performance    Bed Mobility:  Rolling/Turning to Right with contact guard assistance  Scooting anteriorly to EOB to have both feet planted on floor: stand by assistance  Supine to sit from right side of bed with contact guard assistance    Functional Mobility/Transfers:  Sit <> Stand Transfer with contact guard assistance with rolling walker  Bed <> Chair Transfer using Step Transfer technique with contact guard assistance with rolling walker  Toilet Transfer Step Transfer technique with contact guard assistance with rolling walker  Functional Mobility:     Activities of Daily Living:  Upper Body Dressing: minimum assistance .  Lower Body Dressing: minimum assistance .    Cognitive/Visual Perceptual:  Cognitive/Psychosocial Skills:     -       Oriented to: Person, Place, Time, and Situation   -       Follows Commands/attention:Follows multistep  commands  -       Communication: clear/fluent  -       Memory: No Deficits noted  -       Safety awareness/insight to disability: impaired     Physical Exam:  Upper Extremity Range of Motion:     -       Right Upper Extremity: WFL  -       Left Upper Extremity: WFL  Upper Extremity Strength:    -       Right Upper Extremity: MMT: 3/5 GROSSLY  -       Left Upper Extremity: MMT: 3/5 GROSSLY   Strength:    -       Right Upper Extremity: MMT: 3/5 GROSSLY  -       Left Upper Extremity: MMT: 3/5 GROSSLY    AMPAC 6 Click ADL:  AMPAC Total Score: 20    Treatment & Education:  Therapist provided facilitation and instruction of proper body mechanics, energy conservation, and fall prevention strategies during tasks listed above.  Patient educated on role of OT, POC and goals for therapy  Patient educated on importance of OOB activities with staff member assistance and sitting OOB majority of the day.       Patient clear to stand pivot transfer with RN/PCT,  assist x  .    Patient left up in chair with FAMILY.    GOALS:   Multidisciplinary Problems       Occupational Therapy Goals          Problem: Occupational Therapy    Goal Priority Disciplines Outcome Interventions   Occupational Therapy Goal     OT, PT/OT     Description: O.T. GOALS TO BE MET BY 2-7-23  MOD (I) WITH TOILET TRANSFERS  PT WILL TOLERATE 1 SET X 15 REPS B UE ROM EXERCISE 1 SET 15 REPS B UE ROM EXERCISE  MOD (i) WITH LE DRESSING                       History:     Past Medical History:   Diagnosis Date    Hypertension     Other pulmonary embolism without acute cor pulmonale     approx greater than 4 years    Stroke     TIA         Past Surgical History:   Procedure Laterality Date    LEFT HEART CATHETERIZATION Left 1/22/2023    Procedure: CATHETERIZATION, HEART, LEFT;  Surgeon: Elias Brown MD;  Location: [facility] CATH LAB;  Service: Cardiology;  Laterality: Left;    TONSILLECTOMY         Time Tracking:     OT Date of Treatment: 01/24/23  OT Start Time: 0925  OT Stop Time: 0950  OT Total Time (min): 25 min    Billable Minutes: Evaluation 15 MINUTES and Therapeutic Activity 10 MINUTES    1/24/2023

## 2023-01-24 NOTE — ASSESSMENT & PLAN NOTE
See CHF  Unable to lie flat at this time    1/21/23  Stable and able to lie flat today    1/22/23  Post cath stable, add losartan    1/23/23  Breathing improving, on NC    1/24/23  Stable

## 2023-01-24 NOTE — PROGRESS NOTES
[facility]  Cardiology  Progress Note    Patient Name: Violeta Scherer  MRN: [id number]  Admission Date: 1/19/2023  Hospital Length of Stay: 5 days  Code Status: Full Code   Attending Physician: Roberto Perkins, *   Primary Care Physician: Karrie Birmingham MD  Expected Discharge Date: 1/24/2023  Principal Problem:New onset of congestive heart failure    Subjective:   HPI:   Violeta Scherer is a 72 y.o. female with a PMH  has a past medical history of Hypertension, Other pulmonary embolism without acute cor pulmonale, and Stroke. who presented to the ED for further evaluation of progressive worsening shortness of breath x2 days duration.  Patient reportedly had acute onset and progressively worsening shortness of breath over the past 3 days with patient now reporting 3-4 pillow orthopnea, PND, dyspnea on minimal exertion, and bilateral lower extremity pitting edema.  Patient denies history of heart failure, COPD, of prior MIs but did have history of previous stroke/TIA and PE's.  Aggravating factors included those noted above with no known alleviating factors.  All other review of systems negative including for lightheadedness, dizziness, headache, visual changes, fever, chills, sweats, nausea, vomiting, chest pain, abdominal pain, dysuria, hematuria, melena, hematochezia, constipation, diarrhea, or onset neurological deficits.  Prior to onset of symptoms, patient reported being in her usual state health no other concerns or complaints.  Initial workup in the ED revealed patient to have evidence consistent with signs/symptoms of new onset CHF and NSTEMI.  Hospital Medicine consulted by ED staff for admission for continued medical management.     Patient seen and examined in room with daughter present. Increased edema over 3 weeks. Mild epigastric discomfort now resolved. Evidence of NSTEMI and chf with reduced LVEF 25% by exam. Unable to lie flat and will defer cath until improved by Monday or weekend if  needed.  Hospital Course:   1/21/23-Patient stable and plan cath tomorrow    1/22/23-Patient seen and examined in room post [facility]. Diffuse CAD and medical managemt. No chest pain.  Plan GDMT. Family aware and are in agreement.    1/23/23 Pt seen and examined today, s/p LHC, right groin site C/D/I palpable pulses. Denies CP at this time, breathing ok on NC. Labs reviewed stable    1/24/23 Pt seen and examined today. Feels good. LifeVest approved. Labs reviewed, stable. Added BB          Review of Systems   Constitutional: Negative.   HENT: Negative.     Eyes: Negative.    Cardiovascular: Negative.    Respiratory: Negative.     Skin: Negative.    Musculoskeletal: Negative.    Gastrointestinal: Negative.    Genitourinary: Negative.    Neurological: Negative.    Psychiatric/Behavioral: Negative.     Objective:     Vital Signs (Most Recent):  Temp: 97.8 °F (36.6 °C) (01/24/23 1134)  Pulse: 90 (01/24/23 1335)  Resp: 20 (01/24/23 1335)  BP: 135/68 (01/24/23 1134)  SpO2: 95 % (01/24/23 1335) Vital Signs (24h Range):  Temp:  [97.5 °F (36.4 °C)-98.2 °F (36.8 °C)] 97.8 °F (36.6 °C)  Pulse:  [] 90  Resp:  [16-20] 20  SpO2:  [90 %-97 %] 95 %  BP: (102-135)/(50-68) 135/68     Weight: 69.8 kg (153 lb 14.1 oz)  Body mass index is 24.84 kg/m².     SpO2: 95 %         Intake/Output Summary (Last 24 hours) at 1/24/2023 1416  Last data filed at 1/24/2023 0320  Gross per 24 hour   Intake --   Output 200 ml   Net -200 ml       Lines/Drains/Airways       Drain  Duration             Female External Urinary Catheter 01/21/23 1900 2 days              Peripheral Intravenous Line  Duration                  Peripheral IV - Single Lumen 01/19/23 2044 18 G Left Antecubital 4 days                    Physical Exam  Vitals and nursing note reviewed.   Constitutional:       Appearance: Normal appearance.   HENT:      Head: Normocephalic.   Eyes:      Pupils: Pupils are equal, round, and reactive to light.   Cardiovascular:      Rate and Rhythm:  Normal rate and regular rhythm.      Heart sounds: Normal heart sounds, S1 normal and S2 normal. No murmur heard.    No S3 or S4 sounds.   Pulmonary:      Effort: Pulmonary effort is normal.      Breath sounds: Rales present.   Abdominal:      General: Bowel sounds are normal.      Palpations: Abdomen is soft.   Musculoskeletal:         General: Normal range of motion.      Cervical back: Normal range of motion.   Skin:     Capillary Refill: Capillary refill takes less than 2 seconds.   Neurological:      General: No focal deficit present.      Mental Status: She is alert and oriented to person, place, and time.   Psychiatric:         Mood and Affect: Mood normal.         Behavior: Behavior normal.         Thought Content: Thought content normal.       Significant Labs: BMP:   Recent Labs   Lab 01/23/23  1116 01/24/23  0518   * 111*    133*   K 4.0 4.1   CL 97 95   CO2 26 26   BUN 19 19   CREATININE 1.0 0.9   CALCIUM 9.1 8.7   , CMP   Recent Labs   Lab 01/23/23  1116 01/24/23  0518    133*   K 4.0 4.1   CL 97 95   CO2 26 26   * 111*   BUN 19 19   CREATININE 1.0 0.9   CALCIUM 9.1 8.7   ANIONGAP 14 12   , CBC   Recent Labs   Lab 01/23/23  1116 01/24/23  0518   WBC 8.60 7.14   HGB 10.9* 9.5*   HCT 34.7* 30.7*    257   , INR No results for input(s): INR, PROTIME in the last 48 hours., Lipid Panel No results for input(s): CHOL, HDL, LDLCALC, TRIG, CHOLHDL in the last 48 hours., Troponin No results for input(s): TROPONINI in the last 48 hours., and All pertinent lab results from the last 24 hours have been reviewed.    Significant Imaging: Echocardiogram: Transthoracic echo (TTE) complete (Cupid Only):   Results for orders placed or performed during the hospital encounter of 01/19/23   Echo   Result Value Ref Range    BSA 1.81 m2    TDI SEPTAL 0.09 m/s    LV LATERAL E/E' RATIO 15.75 m/s    LV SEPTAL E/E' RATIO 14.00 m/s    LA WIDTH 3.80 cm    IVC diameter 1.69 cm    Left Ventricular  Outflow Tract Mean Velocity 0.63 cm/s    Left Ventricular Outflow Tract Mean Gradient 1.70 mmHg    TDI LATERAL 0.08 m/s    LVIDd 5.08 3.5 - 6.0 cm    IVS 1.51 (A) 0.6 - 1.1 cm    Posterior Wall 1.40 (A) 0.6 - 1.1 cm    Ao root annulus 2.46 cm    LVIDs 4.49 (A) 2.1 - 4.0 cm    FS 12 28 - 44 %    LA volume 47.84 cm3    Sinus 2.41 cm    STJ 2.53 cm    Ascending aorta 2.70 cm    LV mass 315.92 g    LA size 3.52 cm    TAPSE 1.78 cm    Left Ventricle Relative Wall Thickness 0.55 cm    AV mean gradient 17 mmHg    AV valve area 0.88 cm2    AV Velocity Ratio 0.30     AV index (prosthetic) 0.37     MV valve area p 1/2 method 3.23 cm2    E/A ratio 1.19     Mean e' 0.09 m/s    E wave deceleration time 234.50 msec    IVRT 62.80 msec    LVOT diameter 1.74 cm    LVOT area 2.4 cm2    LVOT peak sarmad 0.74 m/s    LVOT peak VTI 17.50 cm    Ao peak sarmad 2.49 m/s    Ao VTI 47.1 cm    RVOT peak sarmad 0.82 m/s    RVOT peak VTI 16.4 cm    Mr max sarmad 5.20 m/s    LVOT stroke volume 41.59 cm3    AV peak gradient 25 mmHg    PV mean gradient 2.05 mmHg    E/E' ratio 14.82 m/s    MV Peak E Sarmad 1.26 m/s    TR Max Sarmad 3.40 m/s    MV stenosis pressure 1/2 time 68.01 ms    MV Peak A Sarmad 1.06 m/s    LV Systolic Volume 91.93 mL    LV Systolic Volume Index 51.4 mL/m2    LV Diastolic Volume 122.55 mL    LV Diastolic Volume Index 68.46 mL/m2    LA Volume Index 26.7 mL/m2    LV Mass Index 176 g/m2    RA Major Axis 4.22 cm    Left Atrium Minor Axis 4.31 cm    Left Atrium Major Axis 4.11 cm    Triscuspid Valve Regurgitation Peak Gradient 46 mmHg    RA Width 3.03 cm    Right Atrial Pressure (from IVC) 15 mmHg    EF 25 %    TV rest pulmonary artery pressure 61 mmHg    Narrative    · The left ventricle is moderately enlarged with concentric hypertrophy   and severely decreased systolic function.  · The estimated ejection fraction is 25%.  · Grade II left ventricular diastolic dysfunction.  · There is severe left ventricular global hypokinesis.  · Normal right  ventricular size with normal right ventricular systolic   function.  · There is mild-to-moderate aortic valve stenosis.  · Aortic valve area is 0.88 cm2; peak velocity is 2.49 m/s; mean gradient   is 17 mmHg.  · Moderate mitral regurgitation.  · Moderate tricuspid regurgitation.  · Elevated central venous pressure (15 mmHg).  · The estimated PA systolic pressure is 61 mmHg.  · There is pulmonary hypertension.  · Trivial circumferential pericardial effusion. Effusion is fluid.      , EKG: reviewed, Stress Test: reviewed, and X-Ray: CXR: X-Ray Chest 1 View (CXR): No results found for this visit on 01/19/23.    Assessment and Plan:         * New onset of congestive heart failure  Patient is identified as having Systolic (HFrEF) heart failure that is Acute. CHF is currently uncontrolled due to Continued edema of extremities. Latest ECHO performed and demonstrates- Results for orders placed during the hospital encounter of 01/19/23    Echo    Interpretation Summary  · The left ventricle is moderately enlarged with concentric hypertrophy and severely decreased systolic function.  · The estimated ejection fraction is 25%.  · Grade II left ventricular diastolic dysfunction.  · There is severe left ventricular global hypokinesis.  · Normal right ventricular size with normal right ventricular systolic function.  · There is mild-to-moderate aortic valve stenosis.  · Aortic valve area is 0.88 cm2; peak velocity is 2.49 m/s; mean gradient is 17 mmHg.  · Moderate mitral regurgitation.  · Moderate tricuspid regurgitation.  · Elevated central venous pressure (15 mmHg).  · The estimated PA systolic pressure is 61 mmHg.  · There is pulmonary hypertension.  · Trivial circumferential pericardial effusion. Effusion is fluid.  . Continue Furosemide and monitor clinical status closely. Monitor on telemetry. Patient is on CHF pathway.  Monitor strict Is&Os and daily weights.  Place on fluid restriction of 2 L. Continue to stress to patient  importance of self efficacy and  on diet for CHF. Last BNP reviewed- and noted below   Recent Labs   Lab 01/19/23  2341   BNP 2,511*   .  Plan continued diuresis and plan [facility]    1/22/13- add losartan     1/23/23  Cont OMT ARB, lasix  Not on BB due to respiratory status, low Bps in hospital  Follow up in [facility]  LifeVest ordered    1/24/23  Added BB, Cont OMT  Follow up in [facility]  LifeVest approved    NSTEMI (non-ST elevated myocardial infarction)  Continue heparin and ASA  Plan [facility] when feasible    1/22/23  Mild diffuse CAD, medical mangement    Elevated troponin  NSTEMI, inferior EKG changes and evidence of diffuse LV dysfunction by echo  Continue Heparin IV  ASA  Plan [facility] when able to lie flat  Now with CHF and edema    Shortness of breath  See CHF  Unable to lie flat at this time    1/21/23  Stable and able to lie flat today    1/22/23  Post cath stable, add losartan    1/23/23  Breathing improving, on NC    1/24/23  Stable    History of pulmonary embolus (PE)  Stable, continue eliquis        VTE Risk Mitigation (From admission, onward)         Ordered     apixaban tablet 2.5 mg  2 times daily         01/22/23 1108     Reason for No Pharmacological VTE Prophylaxis  Once        Question:  Reasons:  Answer:  Physician Provided (leave comment)  Comment:  on heparin drip    01/19/23 2246     IP VTE HIGH RISK PATIENT  Once         01/19/23 2246     Place sequential compression device  Until discontinued         01/19/23 2246                Araceli Gaona NP  Cardiology  [facility]

## 2023-01-24 NOTE — ASSESSMENT & PLAN NOTE
Patient is identified as having Systolic (HFrEF) heart failure that is Acute. CHF is currently uncontrolled due to Continued edema of extremities. Latest ECHO performed and demonstrates- Results for orders placed during the hospital encounter of 01/19/23    Echo    Interpretation Summary  · The left ventricle is moderately enlarged with concentric hypertrophy and severely decreased systolic function.  · The estimated ejection fraction is 25%.  · Grade II left ventricular diastolic dysfunction.  · There is severe left ventricular global hypokinesis.  · Normal right ventricular size with normal right ventricular systolic function.  · There is mild-to-moderate aortic valve stenosis.  · Aortic valve area is 0.88 cm2; peak velocity is 2.49 m/s; mean gradient is 17 mmHg.  · Moderate mitral regurgitation.  · Moderate tricuspid regurgitation.  · Elevated central venous pressure (15 mmHg).  · The estimated PA systolic pressure is 61 mmHg.  · There is pulmonary hypertension.  · Trivial circumferential pericardial effusion. Effusion is fluid.  . Continue Furosemide and monitor clinical status closely. Monitor on telemetry. Patient is on CHF pathway.  Monitor strict Is&Os and daily weights.  Place on fluid restriction of 2 L. Continue to stress to patient importance of self efficacy and  on diet for CHF. Last BNP reviewed- and noted below   Recent Labs   Lab 01/19/23  2341   BNP 2,511*   .  Plan continued diuresis and plan [facility]    1/22/13- add losartan     1/23/23  Cont OMT ARB, lasix  Not on BB due to respiratory status, low Bps in hospital  Follow up in [facility]  LifeVest ordered    1/24/23  Added BB, Cont OMT  Follow up in [facility]  LifeVest approved

## 2023-01-24 NOTE — SUBJECTIVE & OBJECTIVE
Review of Systems   Constitutional: Negative.   HENT: Negative.     Eyes: Negative.    Cardiovascular: Negative.    Respiratory: Negative.     Skin: Negative.    Musculoskeletal: Negative.    Gastrointestinal: Negative.    Genitourinary: Negative.    Neurological: Negative.    Psychiatric/Behavioral: Negative.     Objective:     Vital Signs (Most Recent):  Temp: 97.8 °F (36.6 °C) (01/24/23 1134)  Pulse: 90 (01/24/23 1335)  Resp: 20 (01/24/23 1335)  BP: 135/68 (01/24/23 1134)  SpO2: 95 % (01/24/23 1335) Vital Signs (24h Range):  Temp:  [97.5 °F (36.4 °C)-98.2 °F (36.8 °C)] 97.8 °F (36.6 °C)  Pulse:  [] 90  Resp:  [16-20] 20  SpO2:  [90 %-97 %] 95 %  BP: (102-135)/(50-68) 135/68     Weight: 69.8 kg (153 lb 14.1 oz)  Body mass index is 24.84 kg/m².     SpO2: 95 %         Intake/Output Summary (Last 24 hours) at 1/24/2023 1416  Last data filed at 1/24/2023 0320  Gross per 24 hour   Intake --   Output 200 ml   Net -200 ml       Lines/Drains/Airways       Drain  Duration             Female External Urinary Catheter 01/21/23 1900 2 days              Peripheral Intravenous Line  Duration                  Peripheral IV - Single Lumen 01/19/23 2044 18 G Left Antecubital 4 days                    Physical Exam  Vitals and nursing note reviewed.   Constitutional:       Appearance: Normal appearance.   HENT:      Head: Normocephalic.   Eyes:      Pupils: Pupils are equal, round, and reactive to light.   Cardiovascular:      Rate and Rhythm: Normal rate and regular rhythm.      Heart sounds: Normal heart sounds, S1 normal and S2 normal. No murmur heard.    No S3 or S4 sounds.   Pulmonary:      Effort: Pulmonary effort is normal.      Breath sounds: Rales present.   Abdominal:      General: Bowel sounds are normal.      Palpations: Abdomen is soft.   Musculoskeletal:         General: Normal range of motion.      Cervical back: Normal range of motion.   Skin:     Capillary Refill: Capillary refill takes less than 2  seconds.   Neurological:      General: No focal deficit present.      Mental Status: She is alert and oriented to person, place, and time.   Psychiatric:         Mood and Affect: Mood normal.         Behavior: Behavior normal.         Thought Content: Thought content normal.       Significant Labs: BMP:   Recent Labs   Lab 01/23/23  1116 01/24/23  0518   * 111*    133*   K 4.0 4.1   CL 97 95   CO2 26 26   BUN 19 19   CREATININE 1.0 0.9   CALCIUM 9.1 8.7   , CMP   Recent Labs   Lab 01/23/23  1116 01/24/23  0518    133*   K 4.0 4.1   CL 97 95   CO2 26 26   * 111*   BUN 19 19   CREATININE 1.0 0.9   CALCIUM 9.1 8.7   ANIONGAP 14 12   , CBC   Recent Labs   Lab 01/23/23  1116 01/24/23  0518   WBC 8.60 7.14   HGB 10.9* 9.5*   HCT 34.7* 30.7*    257   , INR No results for input(s): INR, PROTIME in the last 48 hours., Lipid Panel No results for input(s): CHOL, HDL, LDLCALC, TRIG, CHOLHDL in the last 48 hours., Troponin No results for input(s): TROPONINI in the last 48 hours., and All pertinent lab results from the last 24 hours have been reviewed.    Significant Imaging: Echocardiogram: Transthoracic echo (TTE) complete (Cupid Only):   Results for orders placed or performed during the hospital encounter of 01/19/23   Echo   Result Value Ref Range    BSA 1.81 m2    TDI SEPTAL 0.09 m/s    LV LATERAL E/E' RATIO 15.75 m/s    LV SEPTAL E/E' RATIO 14.00 m/s    LA WIDTH 3.80 cm    IVC diameter 1.69 cm    Left Ventricular Outflow Tract Mean Velocity 0.63 cm/s    Left Ventricular Outflow Tract Mean Gradient 1.70 mmHg    TDI LATERAL 0.08 m/s    LVIDd 5.08 3.5 - 6.0 cm    IVS 1.51 (A) 0.6 - 1.1 cm    Posterior Wall 1.40 (A) 0.6 - 1.1 cm    Ao root annulus 2.46 cm    LVIDs 4.49 (A) 2.1 - 4.0 cm    FS 12 28 - 44 %    LA volume 47.84 cm3    Sinus 2.41 cm    STJ 2.53 cm    Ascending aorta 2.70 cm    LV mass 315.92 g    LA size 3.52 cm    TAPSE 1.78 cm    Left Ventricle Relative Wall Thickness 0.55 cm    AV  mean gradient 17 mmHg    AV valve area 0.88 cm2    AV Velocity Ratio 0.30     AV index (prosthetic) 0.37     MV valve area p 1/2 method 3.23 cm2    E/A ratio 1.19     Mean e' 0.09 m/s    E wave deceleration time 234.50 msec    IVRT 62.80 msec    LVOT diameter 1.74 cm    LVOT area 2.4 cm2    LVOT peak sarmad 0.74 m/s    LVOT peak VTI 17.50 cm    Ao peak sarmad 2.49 m/s    Ao VTI 47.1 cm    RVOT peak sarmad 0.82 m/s    RVOT peak VTI 16.4 cm    Mr max sarmad 5.20 m/s    LVOT stroke volume 41.59 cm3    AV peak gradient 25 mmHg    PV mean gradient 2.05 mmHg    E/E' ratio 14.82 m/s    MV Peak E Sarmad 1.26 m/s    TR Max Sarmad 3.40 m/s    MV stenosis pressure 1/2 time 68.01 ms    MV Peak A Sarmad 1.06 m/s    LV Systolic Volume 91.93 mL    LV Systolic Volume Index 51.4 mL/m2    LV Diastolic Volume 122.55 mL    LV Diastolic Volume Index 68.46 mL/m2    LA Volume Index 26.7 mL/m2    LV Mass Index 176 g/m2    RA Major Axis 4.22 cm    Left Atrium Minor Axis 4.31 cm    Left Atrium Major Axis 4.11 cm    Triscuspid Valve Regurgitation Peak Gradient 46 mmHg    RA Width 3.03 cm    Right Atrial Pressure (from IVC) 15 mmHg    EF 25 %    TV rest pulmonary artery pressure 61 mmHg    Narrative    · The left ventricle is moderately enlarged with concentric hypertrophy   and severely decreased systolic function.  · The estimated ejection fraction is 25%.  · Grade II left ventricular diastolic dysfunction.  · There is severe left ventricular global hypokinesis.  · Normal right ventricular size with normal right ventricular systolic   function.  · There is mild-to-moderate aortic valve stenosis.  · Aortic valve area is 0.88 cm2; peak velocity is 2.49 m/s; mean gradient   is 17 mmHg.  · Moderate mitral regurgitation.  · Moderate tricuspid regurgitation.  · Elevated central venous pressure (15 mmHg).  · The estimated PA systolic pressure is 61 mmHg.  · There is pulmonary hypertension.  · Trivial circumferential pericardial effusion. Effusion is fluid.      , EKG:  reviewed, Stress Test: reviewed, and X-Ray: CXR: X-Ray Chest 1 View (CXR): No results found for this visit on 01/19/23.

## 2023-01-24 NOTE — PLAN OF CARE
Cesar received word from Harlan at [facility], regarding Patient's life vest. Life Vest was approved and fitting is scheduled today. Harlan stated he could have a nurse at bedside after 2:30 pm.    Cesar will meet with Halran to assist with any additional needs, if necessary.

## 2023-01-24 NOTE — PROGRESS NOTES
"[facility]) nurse navigator notified of HFT[facility] candidate in need of education and introduction to 4-6 week program.      PT aao x 3 while sitting up in chair with family at bedside. Introduced self to pt as HFT[facility] nurse navigator.     Patient given "Home Care Guide for Heart Failure Patients" , "[facility]" flyer and "Daily weight and symptom tracker".  Encouraged pt and caregiver to review information. Pt informed lives with her daughter, Sherley.    Reviewed the following key points of HFT[facility] program with pt and family:   1.) Take your medications as directed.    2.) Weight yourself daily   3.) Follow low salt and limited fluid diet.    4.) Stop smoking and start exercising   5.) Go to your appointments and call your team.      Pt reminded to follow Symptom tracker and to call at the onset of symptoms according to tracker.     Reviewed plan for follow up once discharged to include phone calls, in person and virtual visits to assist pt optimizing their heart failure medication regimen and encouraging healthy lifestyle modifications.  Reminded pt that program will assist them over the next 4-6 weeks and then patient will be transferred to long term care provider .  Reminded pt how to contact HFT[facility] navigator via phone and or via [employer].     Pt instructed appointment with KWAKU Brasher will be printed on hospital discharge paperwork. Pt informed does follow Dr. Laura outpatient, has an appt in March but will f/u with [facility] post discharge.     Pt also reminded HF nurse will call 48-72 hours after discharge to check on them.     PT and family verbalize read back of information given.  Encouraged pt and family to read over information often and contact team with any questions or concerns.      "

## 2023-01-24 NOTE — NURSING
Gave pt written and verbal discharge education. Pt verbalizes understanding. Pt IV removed, catheter intact. Telemetry removed and returned. Pt discharged in stable condition. Sent home with O2 and lifevest on pt.

## 2023-01-25 ENCOUNTER — NURSE TRIAGE (OUTPATIENT)
Dept: ADMINISTRATIVE | Facility: CLINIC | Age: 73
End: 2023-01-25
Payer: MEDICARE

## 2023-01-26 ENCOUNTER — PES CALL (OUTPATIENT)
Dept: ADMINISTRATIVE | Facility: CLINIC | Age: 73
End: 2023-01-26
Payer: MEDICARE

## 2023-01-26 ENCOUNTER — DOCUMENTATION ONLY (OUTPATIENT)
Dept: CARDIOLOGY | Facility: CLINIC | Age: 73
End: 2023-01-26
Payer: MEDICARE

## 2023-01-26 ENCOUNTER — TELEPHONE (OUTPATIENT)
Dept: CARDIOLOGY | Facility: CLINIC | Age: 73
End: 2023-01-26
Payer: MEDICARE

## 2023-01-26 NOTE — TELEPHONE ENCOUNTER
Was discharged from hospital on yesterday. Was giving losartan 25 mg as ordered at discharge. Medication stated take half of the tablet and was given at 9 am. Most recent blood pressure 93/55 pulse 89.  Reason for Disposition   Triager unable to answer question   [1] Systolic BP  AND [2] taking blood pressure medications AND [3] NOT dizzy, lightheaded or weak    Additional Information   Negative: SEVERE difficulty breathing (e.g., struggling for each breath, speaks in single words)   Negative: Bluish (or gray) lips or face now   Negative: Seizure   Negative: Difficult to awaken or acting confused (e.g., disoriented, slurred speech)   Negative: Shock suspected (e.g., cold/pale/clammy skin, too weak to stand, low BP, rapid pulse)   Negative: [1] Intentional overdose AND [2] suicidal thoughts or ideas   Negative: Suicide attempt, known or suspected   Negative: Sounds like a life-threatening emergency to the triager   Negative: Inhalation of smoke or fumes   Negative: Carbon monoxide exposure, known or suspected   Negative: Chemical in the eye   Negative: Chemical on the skin   Negative: Swallowed a (non-poisonous) foreign body   Negative: [1] HARMFUL SUBSTANCE or ACID or ALKALI ingestion (e.g., toilet , drain , lye, Clinitest tablets, ammonia, bleaches) AND [2] any symptoms (e.g., mouth pain, sore throat, breathing difficulty)   Negative: [1] PETROLEUM PRODUCT ingestion (e.g., kerosene, gasoline, benzene, furniture polish, lighter fluid) AND [2] any symptoms (e.g., breathing difficulty, coughing, vomiting)   Negative: [1] Poison Center advised caller to go to ED AND [2] caller seeking second opinion   Negative: Patient sounds very sick or weak to the triager   Negative: [1] HARMFUL SUBSTANCE or ACID or ALKALI ingestion (e.g., toilet , drain , lye, Clinitest tablets, ammonia, bleaches) AND [2] NO symptoms   Negative: [1] PETROLEUM PRODUCT ingestion (e.g.,  "kerosene, gasoline, benzene, furniture polish, lighter fluid) AND [2] NO symptoms   Negative: MORE THAN A DOUBLE DOSE of a prescription or over-the-counter (OTC) drug   Negative: [1] DOUBLE DOSE (an extra dose or lesser amount) of prescription drug AND [2] any symptoms (e.g., dizziness, nausea, pain, sleepiness)   Negative: [1] DOUBLE DOSE (an extra dose or lesser amount) of over-the-counter (OTC) drug AND [2] any symptoms (e.g., dizziness, nausea, pain, sleepiness)   Negative: Took another person's prescription drug   Negative: Mercury spill (e.g., broken glass thermometer, broken spiral CFL lightbulb)   Negative: Wild mushroom ingestion, questions about   Negative: All OTHER POTENTIALLY HARMFUL SUBSTANCES (e.g., nearly all chemicals, plants, more than a double dose of a drug, took someone else's medicine) (Exception: known harmless substances and asymptomatic double dose of OTC drug)   Negative: Patient or caller provides unclear information about type or amount of substance   Negative: Started suddenly after an allergic medicine, an allergic food, or bee sting   Negative: Shock suspected (e.g., cold/pale/clammy skin, too weak to stand, low BP, rapid pulse)   Negative: Difficult to awaken or acting confused (e.g., disoriented, slurred speech)   Negative: Fainted   Negative: [1] Systolic BP < 90 AND [2] dizzy, lightheaded, or weak   Negative: Chest pain   Negative: Bleeding (e.g., vomiting blood, rectal bleeding or tarry stools, severe vaginal bleeding)(Exception: fainted from sight of small amount of blood; small cut or abrasion)   Negative: Extra heart beats or heart is beating fast  (i.e., "palpitations")   Negative: Sounds like a life-threatening emergency to the triager   Negative: [1] Systolic BP < 80 AND [2] NOT dizzy, lightheaded or weak   Negative: Abdominal pain   Negative: Fever > 100.4 F (38.0 C)   Negative: Major surgery in the past month   Negative: [1] Drinking very little AND " [2] dehydration suspected (e.g., no urine > 12 hours, very dry mouth, very lightheaded)   Negative: [1] Fall in systolic BP > 20 mm Hg from normal AND [2] dizzy, lightheaded, or weak   Negative: Patient sounds very sick or weak to the triager   Negative: [1] Systolic BP < 90 AND [2] NOT dizzy, lightheaded or weak   Negative: [1] Systolic BP  AND [2] taking blood pressure medications AND [3] dizzy, lightheaded or weak    Protocols used: Poisoning-A-AH, Low Blood Pressure-A-AH

## 2023-01-26 NOTE — TELEPHONE ENCOUNTER
"[facility]) hospital discharge 48-72 hour phone follow up completed.     Most Recent Hospital Discharge Date: 1/24/23  Last admission Diagnosis/chief complaint:Primary HTN    TCC nurse Navigator spoke with pts daughter and caregiver Sherley    Current Patient reported weight: has scale at home but no recent wt to report.     Current Patient reported blood pressure and heart rate: 93/55 asymptomatic    Pt reports the following:  []  Shortness of Breath with Activity  []  Shortness of Breath at rest   []  Fatigue  []  Edema   [] Chest pain or tightness  [] Weight Increase since discharge  [x] None of the above  Sherley states pts lifevest is causing her a lot of anxiety and pt is very restless at night.   Medications:   Discharge medication reviewed with pt.  Pt reports having medication list available and has all medications at home for use per list.     Education:   Confirmed pt received "Home Care Guide for Heart Failure Patients" while admitted.   Reviewed key points as listed below.     Recommend 2 -3 gram sodium restriction and 1500 cc-2000 cc fluid restriction.  Encourage physical activity with graded exercise program.  Requested patient to weigh themselves daily, and to notify us if their weight increases by more than 3 lbs in 1 day or 5 lbs in 3 days.   Reminded patient to use "Daily weight and symptom tracker" to record and guide patient on when and how to call [facility]. PT may also use symptom tracker if no scale available  Pt reports being in the green (color) Zone. If in yellow/red, reminded that they should be calling [facility] today or now.     Watch for these Signs and Symptoms: If any of these occur, contact [facility] immediately:   Increase in shortness of breath with movement   Increase in swelling in your legs and ankles   Weight gain of more than 3 pounds in a day or 5 pounds in 3 days.   Difficulty breathing when you are lying down   Worsening fatigue or tiredness   Stomach " bloating, a full feeling or a loss of appetite   Increased coughing--especially when you are lying down      Pt was able to verbalize back to nurse in their own words correct diet/fluid restrictions, necessity for exercise, warning signs and symptoms, when and how to contact their TCC team.      Pt educated on follow-up plan while in [facility] program to include:   Week 1 -  F/u appt with Provider and HF nurse (Date) 1/27/23-Chelsea [facility]     Week 2-5 - In person/ Virtual/ phone call check ins    Week 5-7 - Pt will discharge from [facility] and transition to longterm care provider (Cardiology/PCP/ Advanced Heart Failure).      Patient active on myChart? no      Pt given the following contact information for ease of communication: [phone] (Mon-Fri, 8a-5p) & for urgent issues on the weekend call Demetri on-call [phone] to page the Cardiology MD on call.  Pt also encouraged utilize [facility] messaging as well.      Will follow up with pt at first clinic visit and HF nurse navigator available for pt questions, issues or concerns.

## 2023-01-27 ENCOUNTER — OFFICE VISIT (OUTPATIENT)
Dept: CARDIOLOGY | Facility: CLINIC | Age: 73
End: 2023-01-27
Payer: MEDICARE

## 2023-01-27 VITALS
DIASTOLIC BLOOD PRESSURE: 56 MMHG | WEIGHT: 148.13 LBS | SYSTOLIC BLOOD PRESSURE: 110 MMHG | BODY MASS INDEX: 23.81 KG/M2 | HEIGHT: 66 IN | HEART RATE: 88 BPM

## 2023-01-27 DIAGNOSIS — I21.4 NSTEMI (NON-ST ELEVATED MYOCARDIAL INFARCTION): ICD-10-CM

## 2023-01-27 DIAGNOSIS — I50.9 NEW ONSET OF CONGESTIVE HEART FAILURE: Primary | ICD-10-CM

## 2023-01-27 PROCEDURE — 3008F BODY MASS INDEX DOCD: CPT | Mod: CPTII,S$GLB,, | Performed by: PHYSICIAN ASSISTANT

## 2023-01-27 PROCEDURE — 99999 PR PBB SHADOW E&M-EST. PATIENT-LVL III: ICD-10-PCS | Mod: PBBFAC,,, | Performed by: PHYSICIAN ASSISTANT

## 2023-01-27 PROCEDURE — 99214 OFFICE O/P EST MOD 30 MIN: CPT | Mod: S$GLB,,, | Performed by: PHYSICIAN ASSISTANT

## 2023-01-27 PROCEDURE — 1126F AMNT PAIN NOTED NONE PRSNT: CPT | Mod: CPTII,S$GLB,, | Performed by: PHYSICIAN ASSISTANT

## 2023-01-27 PROCEDURE — 1126F PR PAIN SEVERITY QUANTIFIED, NO PAIN PRESENT: ICD-10-PCS | Mod: CPTII,S$GLB,, | Performed by: PHYSICIAN ASSISTANT

## 2023-01-27 PROCEDURE — 3078F DIAST BP <80 MM HG: CPT | Mod: CPTII,S$GLB,, | Performed by: PHYSICIAN ASSISTANT

## 2023-01-27 PROCEDURE — 4010F PR ACE/ARB THEARPY RXD/TAKEN: ICD-10-PCS | Mod: CPTII,S$GLB,, | Performed by: PHYSICIAN ASSISTANT

## 2023-01-27 PROCEDURE — 3074F PR MOST RECENT SYSTOLIC BLOOD PRESSURE < 130 MM HG: ICD-10-PCS | Mod: CPTII,S$GLB,, | Performed by: PHYSICIAN ASSISTANT

## 2023-01-27 PROCEDURE — 3074F SYST BP LT 130 MM HG: CPT | Mod: CPTII,S$GLB,, | Performed by: PHYSICIAN ASSISTANT

## 2023-01-27 PROCEDURE — 1111F PR DISCHARGE MEDS RECONCILED W/ CURRENT OUTPATIENT MED LIST: ICD-10-PCS | Mod: CPTII,S$GLB,, | Performed by: PHYSICIAN ASSISTANT

## 2023-01-27 PROCEDURE — 3008F PR BODY MASS INDEX (BMI) DOCUMENTED: ICD-10-PCS | Mod: CPTII,S$GLB,, | Performed by: PHYSICIAN ASSISTANT

## 2023-01-27 PROCEDURE — 99999 PR PBB SHADOW E&M-EST. PATIENT-LVL III: CPT | Mod: PBBFAC,,, | Performed by: PHYSICIAN ASSISTANT

## 2023-01-27 PROCEDURE — 4010F ACE/ARB THERAPY RXD/TAKEN: CPT | Mod: CPTII,S$GLB,, | Performed by: PHYSICIAN ASSISTANT

## 2023-01-27 PROCEDURE — 1159F PR MEDICATION LIST DOCUMENTED IN MEDICAL RECORD: ICD-10-PCS | Mod: CPTII,S$GLB,, | Performed by: PHYSICIAN ASSISTANT

## 2023-01-27 PROCEDURE — 1159F MED LIST DOCD IN RCRD: CPT | Mod: CPTII,S$GLB,, | Performed by: PHYSICIAN ASSISTANT

## 2023-01-27 PROCEDURE — 3078F PR MOST RECENT DIASTOLIC BLOOD PRESSURE < 80 MM HG: ICD-10-PCS | Mod: CPTII,S$GLB,, | Performed by: PHYSICIAN ASSISTANT

## 2023-01-27 PROCEDURE — 1111F DSCHRG MED/CURRENT MED MERGE: CPT | Mod: CPTII,S$GLB,, | Performed by: PHYSICIAN ASSISTANT

## 2023-01-27 PROCEDURE — 99214 PR OFFICE/OUTPT VISIT, EST, LEVL IV, 30-39 MIN: ICD-10-PCS | Mod: S$GLB,,, | Performed by: PHYSICIAN ASSISTANT

## 2023-01-27 RX ORDER — SACUBITRIL AND VALSARTAN 24; 26 MG/1; MG/1
1 TABLET, FILM COATED ORAL 2 TIMES DAILY
Qty: 60 TABLET | Refills: 3 | Status: SHIPPED | OUTPATIENT
Start: 2023-01-27 | End: 2023-05-02

## 2023-01-27 NOTE — PROGRESS NOTES
HF [facility] Provider Note (Initial Clinic) Consult Note    Date of original referral: 1/24/2023  Age: 72 y.o.  Gender: female  Ethnicity: Not  or /a   Number of admissions for CHF within the preceding year: 1  Duration of CHF: acute  Type of Congestive Heart Failure: Systolic   Etiology: Non-ischemic    Social history: none  Enrolled in Infusion suite: no    Diagnostic Labs:   EKG - 01/20/2023  CXR - 01/19/2023  ECHO - 01/20/2023  Stress test -   Stress echo -   Pharmacologic stress -   Cardiac catheterization - 01/22/2023   Cardiac MRI -     Lab Results   Component Value Date     (L) 01/24/2023     01/23/2023    K 4.1 01/24/2023    K 4.0 01/23/2023    CL 95 01/24/2023    CL 97 01/23/2023    CO2 26 01/24/2023    CO2 26 01/23/2023     (H) 01/24/2023     (H) 01/23/2023    BUN 19 01/24/2023    BUN 19 01/23/2023    CREATININE 0.9 01/24/2023    CREATININE 1.0 01/23/2023    CALCIUM 8.7 01/24/2023    CALCIUM 9.1 01/23/2023    PROT 6.1 01/22/2023    PROT 6.1 01/21/2023    ALBUMIN 3.0 (L) 01/22/2023    ALBUMIN 2.9 (L) 01/21/2023    BILITOT 1.1 (H) 01/22/2023    BILITOT 1.1 (H) 01/21/2023    ALKPHOS 91 01/22/2023    ALKPHOS 88 01/21/2023    AST 12 01/22/2023    AST 16 01/21/2023    ALT 13 01/22/2023    ALT 15 01/21/2023    ANIONGAP 12 01/24/2023    ANIONGAP 14 01/23/2023       Lab Results   Component Value Date    WBC 7.14 01/24/2023    WBC 8.60 01/23/2023    RBC 3.61 (L) 01/24/2023    RBC 4.14 01/23/2023    HGB 9.5 (L) 01/24/2023    HGB 10.9 (L) 01/23/2023    HCT 30.7 (L) 01/24/2023    HCT 34.7 (L) 01/23/2023    MCV 85 01/24/2023    MCV 84 01/23/2023    MCH 26.3 (L) 01/24/2023    MCH 26.3 (L) 01/23/2023    MCHC 30.9 (L) 01/24/2023    MCHC 31.4 (L) 01/23/2023    RDW 15.2 (H) 01/24/2023    RDW 15.1 (H) 01/23/2023     01/24/2023     01/23/2023    MPV 10.5 01/24/2023    MPV 9.9 01/23/2023    IMMGR 0.3 01/24/2023    IMMGR 0.5 01/23/2023    IGABS 0.02 01/24/2023    IGABS 0.04  01/23/2023    LYMPH 0.9 (L) 01/24/2023    LYMPH 12.9 (L) 01/24/2023    MONO 0.7 01/24/2023    MONO 9.4 01/24/2023    EOS 0.1 01/24/2023    EOS 0.1 01/23/2023    BASO 0.03 01/24/2023    BASO 0.04 01/23/2023    NRBC 0 01/24/2023    NRBC 0 01/23/2023    GRAN 5.4 01/24/2023    GRAN 75.9 (H) 01/24/2023    EOSINOPHIL 1.1 01/24/2023    EOSINOPHIL 0.6 01/23/2023    BASOPHIL 0.4 01/24/2023    BASOPHIL 0.5 01/23/2023       Lab Results   Component Value Date    BNP 2,511 (H) 01/19/2023    BNP 1,940 (H) 01/19/2023    MG 2.1 01/20/2023    TROPONINI 1.019 (H) 01/20/2023    TROPONINI 0.758 (H) 01/19/2023       Lab Results   Component Value Date    CHOL 132 01/20/2023    TRIG 56 01/20/2023    HDL 45 01/20/2023    LDLCALC 75.8 01/20/2023    CHOLHDL 34.1 01/20/2023    TOTALCHOLEST 2.9 01/20/2023    NONHDLCHOL 87 01/20/2023    COLORU Yellow 04/02/2017    APPEARANCEUA Cloudy (A) 04/02/2017    PHUR 6.0 04/02/2017    SPECGRAV 1.020 04/02/2017    PROTEINUA 2+ (A) 04/02/2017    GLUCUA Trace (A) 04/02/2017    KETONESU Negative 04/02/2017    BILIRUBINUA Negative 04/02/2017    OCCULTUA 2+ (A) 04/02/2017    NITRITE Positive (A) 04/02/2017    LEUKOCYTESUR 3+ (A) 04/02/2017       List all implanted cardiac devices:   LV  Cardiomems: no    Current Outpatient Medications on File Prior to Visit   Medication Sig Dispense Refill    albuterol (PROVENTIL/VENTOLIN HFA) 90 mcg/actuation inhaler Inhale into the lungs daily as needed.      aspirin (ECOTRIN) 81 MG EC tablet Take 1 tablet (81 mg total) by mouth once daily. 90 tablet 3    atorvastatin (LIPITOR) 20 MG tablet Take 20 mg by mouth once daily.      docusate sodium (COLACE) 100 MG capsule Take 1 capsule (100 mg total) by mouth 2 (two) times daily as needed for Constipation. 30 capsule 0    EScitalopram oxalate (LEXAPRO) 10 MG tablet Take 10 mg by mouth once daily.      fluticasone-umeclidin-vilanter (TRELEGY ELLIPTA) 100-62.5-25 mcg DsDv Inhale 1 puff into the lungs.      furosemide (LASIX) 20 MG  tablet Take 1 tablet (20 mg total) by mouth once daily. 30 tablet 1    HYDROcodone-acetaminophen (NORCO) 7.5-325 mg per tablet Take 1 tablet by mouth every 6 (six) hours as needed for Pain. 14 tablet 0    losartan (COZAAR) 25 MG tablet Take 0.5 tablets (12.5 mg total) by mouth once daily. 45 tablet 1    metoprolol succinate (TOPROL-XL) 25 MG 24 hr tablet Take 0.5 tablets (12.5 mg total) by mouth every evening. 45 tablet 1    ondansetron (ZOFRAN) 4 MG tablet Take 1 tablet (4 mg total) by mouth every 6 (six) hours. 12 tablet 0    pantoprazole (PROTONIX) 40 MG tablet Take 40 mg by mouth 2 (two) times a day.      potassium chloride SA (K-DUR,KLOR-CON) 20 MEQ tablet Take 1 tablet (20 mEq total) by mouth every other day. 30 tablet 1     No current facility-administered medications on file prior to visit.         HPI:  Patient can walk    Patient sleeps on    Patient wakes up SOB, has to get out of bed, associated cough, sputum    Palpitations -    Dizzy, light-headed, pre-syncope or syncope   Since discharge frequency of performing weights, home weight and weight change   Other information felt pertinent to HPI    Violeta Scherer is a 72 y.o. female with a PMH  has a past medical history of Hypertension, Other pulmonary embolism without acute cor pulmonale, and Stroke. who presented to the ED for further evaluation of progressive worsening shortness of breath x2 days duration.  Patient reportedly had acute onset and progressively worsening shortness of breath over the past 3 days with patient now reporting 3-4 pillow orthopnea, PND, dyspnea on minimal exertion, and bilateral lower extremity pitting edema.  Patient denies history of heart failure, COPD, of prior MIs but did have history of previous stroke/TIA and PE's.  Aggravating factors included those noted above with no known alleviating factors.  All other review of systems negative including for lightheadedness, dizziness, headache, visual changes, fever, chills,  sweats, nausea, vomiting, chest pain, abdominal pain, dysuria, hematuria, melena, hematochezia, constipation, diarrhea, or onset neurological deficits.  Prior to onset of symptoms, patient reported being in her usual state health no other concerns or complaints.  Initial workup in the ED revealed patient to have evidence consistent with signs/symptoms of new onset CHF and NSTEMI.  Hospital Medicine consulted by ED staff for admission for continued medical management.      1/20 admitted for new onset congestive heart failure exacerbation. Echocardiogram with ejection fraction 25%. Cardiology consulted and diuresing. Family endorses orthopnea, increased lower extremity edema. Daughter reports patient did not want to come to hospital. Attempted goals of care discussion but patient unable to participate. Critical care consult. Abg pending  1/21 abg within normal limits. Continue monitoring need for nippv. Diuresing well with intravenous lasix. Tachypnea improving. Disoriented at baseline per daughter. Daughter states baclofen causing drowsiness  1/22 status post lhc. Tolerated well. Remains dyspneic. Continue diuresing. Optimize medication(s) and breathing with diuretics, nitroglycerin, oxygen evaluation. Anticipate discharge 1-2 days.  1/23 S/P  LHC show :Diffuse CAD and medical management. Case D/W Cardiology which rec life vest . NAEON . Denies any new complains .   1/24 Pt was seen and examined at bedside . She was determined to be suitable for d/c . She qulify for home o2 with a o2 sat < 88 % at rest . She will be d/c on losartan , metoprolol , asa , statin and lasix .  Life vest at bedside . She was advised to f/u with her PCP , cardiology and pulmonology ion 1 to 2 weeks .    Since dc has done well at home (dc three days ago). Less SOB, no longer has LE edema. No PND, orthopnea (sounds like she was having PND before admit.    Had a low BP reading 2 days ago after full losartan tablet but no LH or dizziness. On  lasix 20 daily     PHYSICAL: There were no vitals filed for this visit.   Wt Readings from Last 3 Encounters:   01/23/23 69.8 kg (153 lb 14.1 oz)   09/07/21 71 kg (156 lb 8.4 oz)   11/27/18 77.4 kg (170 lb 10.2 oz)       JVD: no   Heart rhythm: regular  Cardiac murmur: No    S3: no  S4: no  Lungs: clear  Liver span: 10 cm:   Hepatojugular reflux: no  Edema: no      ASSESSMENT: acute systolic HF    PLAN:      Patient Instructions:   Instruct the patient to notify this clinic if HH, a physician or an advanced care provider wants to change medication one of their HF medications   Activity and Diet restrictions:   Recommend 2-3 gram sodium restriction and 1500cc- 2000cc fluid restriction.  Encourage physical activity with graded exercise program.  Requested patient to weigh themselves daily, and to notify us if their weight increases by more than 3 lbs in 1 day or 5 lbs in 3 days.    Assigned dry weight on home scale:   Medication changes (include current dose and changed dose)  Stop losartan, allow 24 hour washout  Start entresto 24-26 BID after washout  Cont toprol 12.5 nightly  Lasix 20 daily  Given HF education  Echo in 3 months for AICD, unless return to Dr Laura   Upcoming labs and date anticipated: 10 days with labs

## 2023-01-30 ENCOUNTER — OFFICE VISIT (OUTPATIENT)
Dept: PULMONOLOGY | Facility: CLINIC | Age: 73
End: 2023-01-30
Payer: MEDICARE

## 2023-01-30 ENCOUNTER — LAB VISIT (OUTPATIENT)
Dept: LAB | Facility: HOSPITAL | Age: 73
End: 2023-01-30
Attending: PHYSICIAN ASSISTANT
Payer: MEDICARE

## 2023-01-30 ENCOUNTER — TELEPHONE (OUTPATIENT)
Dept: PULMONOLOGY | Facility: CLINIC | Age: 73
End: 2023-01-30
Payer: MEDICARE

## 2023-01-30 VITALS
BODY MASS INDEX: 23.03 KG/M2 | WEIGHT: 143.31 LBS | HEIGHT: 66 IN | DIASTOLIC BLOOD PRESSURE: 80 MMHG | SYSTOLIC BLOOD PRESSURE: 106 MMHG | RESPIRATION RATE: 18 BRPM | HEART RATE: 74 BPM

## 2023-01-30 DIAGNOSIS — J41.0 SIMPLE CHRONIC BRONCHITIS: Primary | ICD-10-CM

## 2023-01-30 DIAGNOSIS — I50.9 NEW ONSET OF CONGESTIVE HEART FAILURE: ICD-10-CM

## 2023-01-30 DIAGNOSIS — F32.89 OTHER DEPRESSION: ICD-10-CM

## 2023-01-30 DIAGNOSIS — Z86.73 HISTORY OF CVA (CEREBROVASCULAR ACCIDENT): ICD-10-CM

## 2023-01-30 DIAGNOSIS — G47.30 SLEEP-DISORDERED BREATHING: ICD-10-CM

## 2023-01-30 DIAGNOSIS — J44.9 COPD (CHRONIC OBSTRUCTIVE PULMONARY DISEASE): ICD-10-CM

## 2023-01-30 DIAGNOSIS — I10 PRIMARY HYPERTENSION: ICD-10-CM

## 2023-01-30 PROCEDURE — 3288F FALL RISK ASSESSMENT DOCD: CPT | Mod: CPTII,S$GLB,, | Performed by: PHYSICIAN ASSISTANT

## 2023-01-30 PROCEDURE — 4010F PR ACE/ARB THEARPY RXD/TAKEN: ICD-10-PCS | Mod: CPTII,S$GLB,, | Performed by: PHYSICIAN ASSISTANT

## 2023-01-30 PROCEDURE — G0008 ADMIN INFLUENZA VIRUS VAC: HCPCS | Mod: S$GLB,,, | Performed by: PHYSICIAN ASSISTANT

## 2023-01-30 PROCEDURE — 1159F MED LIST DOCD IN RCRD: CPT | Mod: CPTII,S$GLB,, | Performed by: PHYSICIAN ASSISTANT

## 2023-01-30 PROCEDURE — 90677 PNEUMOCOCCAL CONJUGATE VACCINE 20-VALENT: ICD-10-PCS | Mod: S$GLB,,, | Performed by: PHYSICIAN ASSISTANT

## 2023-01-30 PROCEDURE — 86038 ANTINUCLEAR ANTIBODIES: CPT | Performed by: PHYSICIAN ASSISTANT

## 2023-01-30 PROCEDURE — 82103 ALPHA-1-ANTITRYPSIN TOTAL: CPT | Mod: 91 | Performed by: PHYSICIAN ASSISTANT

## 2023-01-30 PROCEDURE — 3079F PR MOST RECENT DIASTOLIC BLOOD PRESSURE 80-89 MM HG: ICD-10-PCS | Mod: CPTII,S$GLB,, | Performed by: PHYSICIAN ASSISTANT

## 2023-01-30 PROCEDURE — 82785 ASSAY OF IGE: CPT | Performed by: PHYSICIAN ASSISTANT

## 2023-01-30 PROCEDURE — 82103 ALPHA-1-ANTITRYPSIN TOTAL: CPT | Performed by: PHYSICIAN ASSISTANT

## 2023-01-30 PROCEDURE — 1101F PR PT FALLS ASSESS DOC 0-1 FALLS W/OUT INJ PAST YR: ICD-10-PCS | Mod: CPTII,S$GLB,, | Performed by: PHYSICIAN ASSISTANT

## 2023-01-30 PROCEDURE — 90677 PCV20 VACCINE IM: CPT | Mod: S$GLB,,, | Performed by: PHYSICIAN ASSISTANT

## 2023-01-30 PROCEDURE — 1111F PR DISCHARGE MEDS RECONCILED W/ CURRENT OUTPATIENT MED LIST: ICD-10-PCS | Mod: CPTII,S$GLB,, | Performed by: PHYSICIAN ASSISTANT

## 2023-01-30 PROCEDURE — 99999 PR PBB SHADOW E&M-EST. PATIENT-LVL IV: ICD-10-PCS | Mod: PBBFAC,,, | Performed by: PHYSICIAN ASSISTANT

## 2023-01-30 PROCEDURE — 3074F SYST BP LT 130 MM HG: CPT | Mod: CPTII,S$GLB,, | Performed by: PHYSICIAN ASSISTANT

## 2023-01-30 PROCEDURE — 1159F PR MEDICATION LIST DOCUMENTED IN MEDICAL RECORD: ICD-10-PCS | Mod: CPTII,S$GLB,, | Performed by: PHYSICIAN ASSISTANT

## 2023-01-30 PROCEDURE — 3288F PR FALLS RISK ASSESSMENT DOCUMENTED: ICD-10-PCS | Mod: CPTII,S$GLB,, | Performed by: PHYSICIAN ASSISTANT

## 2023-01-30 PROCEDURE — 99204 OFFICE O/P NEW MOD 45 MIN: CPT | Mod: S$GLB,,, | Performed by: PHYSICIAN ASSISTANT

## 2023-01-30 PROCEDURE — 36415 COLL VENOUS BLD VENIPUNCTURE: CPT | Performed by: PHYSICIAN ASSISTANT

## 2023-01-30 PROCEDURE — 1111F DSCHRG MED/CURRENT MED MERGE: CPT | Mod: CPTII,S$GLB,, | Performed by: PHYSICIAN ASSISTANT

## 2023-01-30 PROCEDURE — 3074F PR MOST RECENT SYSTOLIC BLOOD PRESSURE < 130 MM HG: ICD-10-PCS | Mod: CPTII,S$GLB,, | Performed by: PHYSICIAN ASSISTANT

## 2023-01-30 PROCEDURE — G0009 ADMIN PNEUMOCOCCAL VACCINE: HCPCS | Mod: S$GLB,,, | Performed by: PHYSICIAN ASSISTANT

## 2023-01-30 PROCEDURE — 82104 ALPHA-1-ANTITRYPSIN PHENO: CPT | Performed by: PHYSICIAN ASSISTANT

## 2023-01-30 PROCEDURE — 99204 PR OFFICE/OUTPT VISIT, NEW, LEVL IV, 45-59 MIN: ICD-10-PCS | Mod: S$GLB,,, | Performed by: PHYSICIAN ASSISTANT

## 2023-01-30 PROCEDURE — G0008 FLU VACCINE - QUADRIVALENT - ADJUVANTED: ICD-10-PCS | Mod: S$GLB,,, | Performed by: PHYSICIAN ASSISTANT

## 2023-01-30 PROCEDURE — 3008F BODY MASS INDEX DOCD: CPT | Mod: CPTII,S$GLB,, | Performed by: PHYSICIAN ASSISTANT

## 2023-01-30 PROCEDURE — 1160F PR REVIEW ALL MEDS BY PRESCRIBER/CLIN PHARMACIST DOCUMENTED: ICD-10-PCS | Mod: CPTII,S$GLB,, | Performed by: PHYSICIAN ASSISTANT

## 2023-01-30 PROCEDURE — 3079F DIAST BP 80-89 MM HG: CPT | Mod: CPTII,S$GLB,, | Performed by: PHYSICIAN ASSISTANT

## 2023-01-30 PROCEDURE — G0009 PNEUMOCOCCAL CONJUGATE VACCINE 20-VALENT: ICD-10-PCS | Mod: S$GLB,,, | Performed by: PHYSICIAN ASSISTANT

## 2023-01-30 PROCEDURE — 90694 VACC AIIV4 NO PRSRV 0.5ML IM: CPT | Mod: S$GLB,,, | Performed by: PHYSICIAN ASSISTANT

## 2023-01-30 PROCEDURE — 99999 PR PBB SHADOW E&M-EST. PATIENT-LVL IV: CPT | Mod: PBBFAC,,, | Performed by: PHYSICIAN ASSISTANT

## 2023-01-30 PROCEDURE — 4010F ACE/ARB THERAPY RXD/TAKEN: CPT | Mod: CPTII,S$GLB,, | Performed by: PHYSICIAN ASSISTANT

## 2023-01-30 PROCEDURE — 1101F PT FALLS ASSESS-DOCD LE1/YR: CPT | Mod: CPTII,S$GLB,, | Performed by: PHYSICIAN ASSISTANT

## 2023-01-30 PROCEDURE — 90694 FLU VACCINE - QUADRIVALENT - ADJUVANTED: ICD-10-PCS | Mod: S$GLB,,, | Performed by: PHYSICIAN ASSISTANT

## 2023-01-30 PROCEDURE — 3008F PR BODY MASS INDEX (BMI) DOCUMENTED: ICD-10-PCS | Mod: CPTII,S$GLB,, | Performed by: PHYSICIAN ASSISTANT

## 2023-01-30 PROCEDURE — 1160F RVW MEDS BY RX/DR IN RCRD: CPT | Mod: CPTII,S$GLB,, | Performed by: PHYSICIAN ASSISTANT

## 2023-01-30 RX ORDER — ALBUTEROL SULFATE 1.25 MG/3ML
1.25 SOLUTION RESPIRATORY (INHALATION) EVERY 6 HOURS PRN
Qty: 75 ML | Refills: 11 | Status: SHIPPED | OUTPATIENT
Start: 2023-01-30 | End: 2024-01-30

## 2023-01-30 NOTE — ASSESSMENT & PLAN NOTE
Continue Trelegy daily - rinse mouth after use  Albuterol home Nebulizer as needed given today  PFT, ABG, walk in 3 months  Pulmonary Disease Management  Labs today  Flu and pcv20 shot today  Recommend covid vaccine, patient declines at this time

## 2023-01-30 NOTE — ASSESSMENT & PLAN NOTE
STOPbang 5, Hilliards 3, Ep 15 - high risk for ANTONIO  PSG  ANTONIO and implications on health discussed

## 2023-01-30 NOTE — PROGRESS NOTES
Subjective:       Patient ID: Violeta Scherer is a 72 y.o. female.    Chief Complaint: COPD      71yo female here for hospital follow up and establish pulmonary care  History of COPD controlled on Trelegy daily, albuterol hfa as needed  Recent admission for acute CHF, was discharged home on oxygen 3L continuous, DME [facility]  She was not previously on oxygen  Prior to hospital admission she was doing well, chronic SOB mrC 2  No daily cough  No other hospitalizations or COPD exacerbations  No PNA  Quit smoking 24 years ago  Work history: , her  worked in plants  She is accompanied by her granddaughter and daughter today who are assisting with history  Since hospital stay she has been doing well with less SOB, although she notes some increase in bilateral leg edema - took an extra 20mg lasix this morning  She is prescribed 20mg lasix daily; her daughter will call cardiologist to let them know, she does have a follow up with cards scheduled early next week  No chest pain, cough, or fever  She does not have a diagnosis of ANTONIO but daughter reports she snores at night, witnessed apneas  No trouble falling or staying asleep but does have daytime fatigue  Wakes up and does not feel rested  [place] 15  Sees GI doctor, Dr. Mervin Gaitan MD; history of esophageal dilation   Reports voice hoarseness, this is a chronic problem, she states this started after starting Trelegy 7 years ago  No mouth pain or difficulty or painful swallowing  No hemoptysis or weight loss     COPD Questionnaire  How often do you cough?: A little of the time  How often do you have phlegm (mucus) in your chest?: Almost never  How often does your chest feel tight?: Some of the time  When you walk up a hill or one flight of stairs, how often are you breathless?: All of the time  How often are you limited doing any activities at home?: Some of the time  How often are you confident leaving the house despite your lung condition?: All of the  time  How often do you sleep soundly?: All of the time  How often do you have energy?: Most of the time  Total score: 15    Hospital course reviewed below:     Patient Name: Violeta Scherer  MRN: [id number]  [facility]: [id number]  Patient Class: IP- Inpatient  Admission Date: 1/19/2023  Hospital Length of Stay: 5 days  Discharge Date and Time:  01/24/2023 12:21 PM  Attending Physician: Roberto Perkins, *   Discharging Provider: Roberto Perkins MD  Primary Care Provider: Karrie Birmingham MD     Primary Care Team: Networked reference to record PCT      HPI:   Violeta Scherer is a 72 y.o. female with a PMH  has a past medical history of Hypertension, Other pulmonary embolism without acute cor pulmonale, and Stroke. who presented to the ED for further evaluation of progressive worsening shortness of breath x2 days duration.  Patient reportedly had acute onset and progressively worsening shortness of breath over the past 3 days with patient now reporting 3-4 pillow orthopnea, PND, dyspnea on minimal exertion, and bilateral lower extremity pitting edema.  Patient denies history of heart failure, COPD, of prior MIs but did have history of previous stroke/TIA and PE's.  Aggravating factors included those noted above with no known alleviating factors.  All other review of systems negative including for lightheadedness, dizziness, headache, visual changes, fever, chills, sweats, nausea, vomiting, chest pain, abdominal pain, dysuria, hematuria, melena, hematochezia, constipation, diarrhea, or onset neurological deficits.  Prior to onset of symptoms, patient reported being in her usual state health no other concerns or complaints.  Initial workup in the ED revealed patient to have evidence consistent with signs/symptoms of new onset CHF and NSTEMI.  Hospital Medicine consulted by ED staff for admission for continued medical management.       PCP: Karrie Birmingham           Procedure(s) (LRB):  CATHETERIZATION, HEART,  LEFT (Left)       Hospital Course:   1/20 admitted for new onset congestive heart failure exacerbation. Echocardiogram with ejection fraction 25%. Cardiology consulted and diuresing. Family endorses orthopnea, increased lower extremity edema. Daughter reports patient did not want to come to hospital. Attempted goals of care discussion but patient unable to participate. Critical care consult. Abg pending  1/21 abg within normal limits. Continue monitoring need for nippv. Diuresing well with intravenous lasix. Tachypnea improving. Disoriented at baseline per daughter. Daughter states baclofen causing drowsiness  1/22 status post lhc. Tolerated well. Remains dyspneic. Continue diuresing. Optimize medication(s) and breathing with diuretics, nitroglycerin, oxygen evaluation. Anticipate discharge 1-2 days.  1/23 S/P  LHC show :Diffuse CAD and medical management. Case D/W Cardiology which rec life vest . NAEON . Denies any new complains .   1/24 Pt was seen and examined at bedside . She was determined to be suitable for d/c . She qulify for home o2 with a o2 sat < 88 % at rest . She will be d/c on losartan , metoprolol , asa , statin and lasix .  Life vest at bedside . She was advised to f/u with her PCP , cardiology and pulmonology ion 1 to 2 weeks .    BP Readings from Last 3 Encounters:   01/30/23 106/80   01/27/23 (!) 110/56   01/24/23 135/68     Snoring / Sleep:     Henderson Questionnaire (validated ANTONIO screening questionnaire)    yes -- Snoring/apnea    yes -- Fatigue    Body mass index is 23.13 kg/m².  (>25 is overweight, >30 is obese)    Blood Pressure = Hypertension  (PreHTN 120-139/80-89, Stg1 140-159/90-99, Stg2 >160/>100)  Henderson = 3 of three ANTONIO categories are positive (high risk is 2-3 positive categories)     STOP-Bang Questionnaire (validated ANTONIO screening questionnaire)  Negative unless checked off.  [x] Snoring    [x]  Tired/Fatigued/Sleepy  [x] Obstruction (apneas/choking)  [x] Pressure (HTN)  [] BMI  >35  [x] Age >50  [] Neck >40 cm  [] Gender male   STOP-Bang = 5 (low risk 0-2,high risk 3-8)      Immunization History   Administered Date(s) Administered    Influenza (FLUAD) - Quadrivalent - Adjuvanted - PF *Preferred* (65+) 01/30/2023    Pneumococcal Conjugate - 20 Valent 01/30/2023      Tobacco Use: Medium Risk    Smoking Tobacco Use: Former    Smokeless Tobacco Use: Unknown    Passive Exposure: Not on file      Past Medical History:   Diagnosis Date    Hypertension     Other pulmonary embolism without acute cor pulmonale     approx greater than 4 years    Stroke     TIA      Current Outpatient Medications on File Prior to Visit   Medication Sig Dispense Refill    albuterol (PROVENTIL/VENTOLIN HFA) 90 mcg/actuation inhaler Inhale into the lungs daily as needed.      aspirin (ECOTRIN) 81 MG EC tablet Take 1 tablet (81 mg total) by mouth once daily. 90 tablet 3    atorvastatin (LIPITOR) 20 MG tablet Take 20 mg by mouth once daily.      docusate sodium (COLACE) 100 MG capsule Take 1 capsule (100 mg total) by mouth 2 (two) times daily as needed for Constipation. 30 capsule 0    EScitalopram oxalate (LEXAPRO) 10 MG tablet Take 10 mg by mouth once daily.      fluticasone-umeclidin-vilanter (TRELEGY ELLIPTA) 100-62.5-25 mcg DsDv Inhale 1 puff into the lungs.      furosemide (LASIX) 20 MG tablet Take 1 tablet (20 mg total) by mouth once daily. 30 tablet 1    metoprolol succinate (TOPROL-XL) 25 MG 24 hr tablet Take 0.5 tablets (12.5 mg total) by mouth every evening. 45 tablet 1    ondansetron (ZOFRAN) 4 MG tablet Take 1 tablet (4 mg total) by mouth every 6 (six) hours. 12 tablet 0    pantoprazole (PROTONIX) 40 MG tablet Take 40 mg by mouth 2 (two) times a day.      potassium chloride SA (K-DUR,KLOR-CON) 20 MEQ tablet Take 1 tablet (20 mEq total) by mouth every other day. 30 tablet 1    sacubitriL-valsartan (ENTRESTO) 24-26 mg per tablet Take 1 tablet by mouth 2 (two) times daily. 60 tablet 3    HYDROcodone-acetaminophen  "(NORCO) 7.5-325 mg per tablet Take 1 tablet by mouth every 6 (six) hours as needed for Pain. (Patient not taking: Reported on 1/30/2023) 14 tablet 0     No current facility-administered medications on file prior to visit.        Review of Systems   Constitutional:  Positive for fatigue. Negative for fever, weight loss, appetite change and weakness.   HENT:  Negative for postnasal drip, rhinorrhea, sinus pressure, trouble swallowing and congestion.    Respiratory:  Positive for snoring, shortness of breath, dyspnea on extertion and somnolence. Negative for cough, sputum production, choking, chest tightness and wheezing.    Cardiovascular:  Positive for leg swelling. Negative for chest pain.   Musculoskeletal:  Positive for arthralgias. Negative for gait problem and joint swelling.   Gastrointestinal:  Negative for nausea, vomiting and abdominal pain.   Neurological:  Negative for dizziness, weakness and headaches.   All other systems reviewed and are negative.    Objective:       Vitals:    01/30/23 1110   BP: 106/80   Pulse: 74   Resp: 18   Weight: 65 kg (143 lb 4.8 oz)   Height: 5' 6" (1.676 m)       Physical Exam   Constitutional: She is oriented to person, place, and time. She appears well-developed. No distress.   HENT:   Head: Normocephalic.   Mouth/Throat: Oropharynx is clear and moist.   Cardiovascular: Normal rate and regular rhythm.   Pulmonary/Chest: Effort normal. No respiratory distress. She has no wheezes. She has no rhonchi. She has no rales.   Musculoskeletal:         General: Edema (bilateral 1+) present.      Cervical back: Normal range of motion and neck supple.   Lymphadenopathy: No supraclavicular adenopathy is present.     She has no cervical adenopathy.   Neurological: She is alert and oriented to person, place, and time.   Skin: Skin is warm and dry.   Psychiatric: She has a normal mood and affect.   Vitals reviewed.  Personal Diagnostic Review    Cardiac catheterization    The Mid LAD lesion " was 50% stenosed.    The Mid Cx lesion was 65% stenosed. ( Diffuse disease)    The Dist Cx lesion was 99% stenosed.( diffuse disease)    The LPAV lesion was 99% stenosed.( diffuse disease)    The ejection fraction was calculated to be 20%.    The pre-procedure left ventricular end diastolic pressure was 31.    The post-procedure left ventricular end diastolic pressure was 32.    The estimated blood loss was none.    There was single vessel coronary artery disease.    Recommend medical therapy    The procedure log was documented by Documenter: Xavier Alexander RN and   verified by Elias Brown MD.    Date: 1/22/2023  Time: 2:51 PM          No flowsheet data found.      Assessment/Plan:       Problem List Items Addressed This Visit          Neuro    History of CVA (cerebrovascular accident)     4 years ago            Psychiatric    Depression     Stable, F/u regularly with PCP              Pulmonary    COPD (chronic obstructive pulmonary disease) - Primary     Continue Trelegy daily - rinse mouth after use  Albuterol home Nebulizer as needed given today  PFT, ABG, walk in 3 months  Pulmonary Disease Management  Labs today  Flu and pcv20 shot today  Recommend covid vaccine, patient declines at this time         Relevant Medications    albuterol (ACCUNEB) 1.25 mg/3 mL Nebu    Other Relevant Orders    NEBULIZER KIT (SUPPLIES) FOR HOME USE    NEBULIZER FOR HOME USE    Complete PFT with bronchodilator    Stress test, pulmonary    PULM - Arterial Blood Gases--in addition to PFT only    ALPHA 1 ANTITRYPSIN PHENOTYPE    Alpha-1-Antitrypsin    IGE    HILARIO Screen w/Reflex    (In Office Administered) Pneumococcal Conjugate Vaccine (20 Valent) (IM) (Completed)       Cardiac/Vascular    Primary hypertension     Controlled, F/u regularly with PCP           New onset of congestive heart failure     Following with cardiology    Echo    Interpretation Summary  · The left ventricle is moderately enlarged with concentric hypertrophy  and severely decreased systolic function.  · The estimated ejection fraction is 25%.  · Grade II left ventricular diastolic dysfunction.  · There is severe left ventricular global hypokinesis.  · Normal right ventricular size with normal right ventricular systolic function.  · There is mild-to-moderate aortic valve stenosis.  · Aortic valve area is 0.88 cm2; peak velocity is 2.49 m/s; mean gradient is 17 mmHg.  · Moderate mitral regurgitation.  · Moderate tricuspid regurgitation.  · Elevated central venous pressure (15 mmHg).  · The estimated PA systolic pressure is 61 mmHg.  · There is pulmonary hypertension.  · Trivial circumferential pericardial effusion. Effusion is fluid.              Other    Sleep-disordered breathing     STOPbang 5, [place] 3, Ep 15 - high risk for ANTONIO  PSG  ANTONIO and implications on health discussed           Relevant Orders    Polysomnogram (CPAP will be added if patient meets diagnostic criteria.)       Follow up in about 3 months (around 4/30/2023) for home neb, flu, pcv20, labs today; pft, abg, walk next and f/u dr. bui.  Patient requests to follow up with Dr. Bui as she saw him in the hospital.    Discussed diagnosis, its evaluation, treatment and usual course. All questions answered.    Patient verbalized understanding of plan and left in no acute distress    Thank you for the courtesy of participating in the care of this patient    Elizabeth G Blough, PA-C [facility]

## 2023-01-30 NOTE — ASSESSMENT & PLAN NOTE
Following with cardiology    Echo    Interpretation Summary  · The left ventricle is moderately enlarged with concentric hypertrophy and severely decreased systolic function.  · The estimated ejection fraction is 25%.  · Grade II left ventricular diastolic dysfunction.  · There is severe left ventricular global hypokinesis.  · Normal right ventricular size with normal right ventricular systolic function.  · There is mild-to-moderate aortic valve stenosis.  · Aortic valve area is 0.88 cm2; peak velocity is 2.49 m/s; mean gradient is 17 mmHg.  · Moderate mitral regurgitation.  · Moderate tricuspid regurgitation.  · Elevated central venous pressure (15 mmHg).  · The estimated PA systolic pressure is 61 mmHg.  · There is pulmonary hypertension.  · Trivial circumferential pericardial effusion. Effusion is fluid.

## 2023-01-31 LAB
A1AT SERPL-MCNC: 232 MG/DL (ref 100–190)
ANA SER QL IF: NORMAL
IGE SERPL-ACNC: <35 IU/ML (ref 0–100)

## 2023-01-31 NOTE — PROGRESS NOTES
I have seen and examined this patient as a shared visit with the KELLY d/t complicated medical management of New onset of congestive heart failure and high acuity requiring physician expertise in medical decision making.    Review of Systems:   Pain scale: 0/10  Gen- Weakness/ Fatigue  Neuro- Confusion  CV- Chest Pain/ Palpitations  Resp: Cough/ SOB  GI- Nausea/Vomiting  Extrem- Pain/Swelling      Physical Exam:  General- Patient alert and oriented x3 in NAD  HEENT- PERRLA, EOMI  Neck- No JVD  CV- Regular rate and rhythm  Resp- No increased WOB  GI- Non -distended  Extrem- No cyanosis      Assessment and plan-    Active Hospital Problems    Diagnosis  POA    *New onset of congestive heart failure [I50.9]  Yes    Primary hypertension [I10]  Yes    History of CVA (cerebrovascular accident) [Z86.73]  Not Applicable    History of pulmonary embolus (PE) [Z86.711]  Yes    SOB (shortness of breath) [R06.02]  Yes    Elevated troponin [R77.8]  Yes    NSTEMI (non-ST elevated myocardial infarction) [I21.4]  Yes    HLD (hyperlipidemia) [E78.5]  Yes    Depression [F32.A]  Yes    GERD (gastroesophageal reflux disease) [K21.9]  Yes      Resolved Hospital Problems   No resolved problems to display.      Doing well with Cardiac therapies  Hopefully wean off O2  Will follow in office         Kirk Bui MD  Critical Care Medicine  [facility]

## 2023-02-04 LAB
A1AT PHENOTYP SERPL-IMP: ABNORMAL
A1AT SERPL NEPH-MCNC: 210 MG/DL (ref 100–190)

## 2023-02-22 ENCOUNTER — TELEPHONE (OUTPATIENT)
Dept: PULMONOLOGY | Facility: CLINIC | Age: 73
End: 2023-02-22
Payer: MEDICARE

## 2023-04-24 PROBLEM — I21.4 NSTEMI (NON-ST ELEVATED MYOCARDIAL INFARCTION): Status: RESOLVED | Noted: 2023-01-20 | Resolved: 2023-04-24

## 2023-05-02 ENCOUNTER — OFFICE VISIT (OUTPATIENT)
Dept: CARDIOLOGY | Facility: CLINIC | Age: 73
End: 2023-05-02
Payer: MEDICARE

## 2023-05-02 ENCOUNTER — LAB VISIT (OUTPATIENT)
Dept: LAB | Facility: HOSPITAL | Age: 73
End: 2023-05-02
Attending: PHYSICIAN ASSISTANT
Payer: MEDICARE

## 2023-05-02 VITALS
SYSTOLIC BLOOD PRESSURE: 100 MMHG | WEIGHT: 138.25 LBS | HEIGHT: 66 IN | DIASTOLIC BLOOD PRESSURE: 44 MMHG | BODY MASS INDEX: 22.22 KG/M2 | HEART RATE: 70 BPM

## 2023-05-02 DIAGNOSIS — I50.9 NEW ONSET OF CONGESTIVE HEART FAILURE: ICD-10-CM

## 2023-05-02 DIAGNOSIS — I50.9 NEW ONSET OF CONGESTIVE HEART FAILURE: Primary | ICD-10-CM

## 2023-05-02 LAB
ANION GAP SERPL CALC-SCNC: 7 MMOL/L (ref 8–16)
BUN SERPL-MCNC: 12 MG/DL (ref 8–23)
CALCIUM SERPL-MCNC: 8.9 MG/DL (ref 8.7–10.5)
CHLORIDE SERPL-SCNC: 103 MMOL/L (ref 95–110)
CO2 SERPL-SCNC: 29 MMOL/L (ref 23–29)
CREAT SERPL-MCNC: 0.8 MG/DL (ref 0.5–1.4)
EST. GFR  (NO RACE VARIABLE): >60 ML/MIN/1.73 M^2
GLUCOSE SERPL-MCNC: 89 MG/DL (ref 70–110)
POTASSIUM SERPL-SCNC: 4.2 MMOL/L (ref 3.5–5.1)
SODIUM SERPL-SCNC: 139 MMOL/L (ref 136–145)

## 2023-05-02 PROCEDURE — 99999 PR PBB SHADOW E&M-EST. PATIENT-LVL III: ICD-10-PCS | Mod: PBBFAC,,, | Performed by: PHYSICIAN ASSISTANT

## 2023-05-02 PROCEDURE — 1159F PR MEDICATION LIST DOCUMENTED IN MEDICAL RECORD: ICD-10-PCS | Mod: CPTII,S$GLB,, | Performed by: PHYSICIAN ASSISTANT

## 2023-05-02 PROCEDURE — 3008F PR BODY MASS INDEX (BMI) DOCUMENTED: ICD-10-PCS | Mod: CPTII,S$GLB,, | Performed by: PHYSICIAN ASSISTANT

## 2023-05-02 PROCEDURE — 3078F DIAST BP <80 MM HG: CPT | Mod: CPTII,S$GLB,, | Performed by: PHYSICIAN ASSISTANT

## 2023-05-02 PROCEDURE — 1126F PR PAIN SEVERITY QUANTIFIED, NO PAIN PRESENT: ICD-10-PCS | Mod: CPTII,S$GLB,, | Performed by: PHYSICIAN ASSISTANT

## 2023-05-02 PROCEDURE — 99213 PR OFFICE/OUTPT VISIT, EST, LEVL III, 20-29 MIN: ICD-10-PCS | Mod: S$GLB,,, | Performed by: PHYSICIAN ASSISTANT

## 2023-05-02 PROCEDURE — 3074F SYST BP LT 130 MM HG: CPT | Mod: CPTII,S$GLB,, | Performed by: PHYSICIAN ASSISTANT

## 2023-05-02 PROCEDURE — 1159F MED LIST DOCD IN RCRD: CPT | Mod: CPTII,S$GLB,, | Performed by: PHYSICIAN ASSISTANT

## 2023-05-02 PROCEDURE — 1160F RVW MEDS BY RX/DR IN RCRD: CPT | Mod: CPTII,S$GLB,, | Performed by: PHYSICIAN ASSISTANT

## 2023-05-02 PROCEDURE — 4010F PR ACE/ARB THEARPY RXD/TAKEN: ICD-10-PCS | Mod: CPTII,S$GLB,, | Performed by: PHYSICIAN ASSISTANT

## 2023-05-02 PROCEDURE — 99213 OFFICE O/P EST LOW 20 MIN: CPT | Mod: S$GLB,,, | Performed by: PHYSICIAN ASSISTANT

## 2023-05-02 PROCEDURE — 80048 BASIC METABOLIC PNL TOTAL CA: CPT | Performed by: PHYSICIAN ASSISTANT

## 2023-05-02 PROCEDURE — 99999 PR PBB SHADOW E&M-EST. PATIENT-LVL III: CPT | Mod: PBBFAC,,, | Performed by: PHYSICIAN ASSISTANT

## 2023-05-02 PROCEDURE — 1160F PR REVIEW ALL MEDS BY PRESCRIBER/CLIN PHARMACIST DOCUMENTED: ICD-10-PCS | Mod: CPTII,S$GLB,, | Performed by: PHYSICIAN ASSISTANT

## 2023-05-02 PROCEDURE — 1126F AMNT PAIN NOTED NONE PRSNT: CPT | Mod: CPTII,S$GLB,, | Performed by: PHYSICIAN ASSISTANT

## 2023-05-02 PROCEDURE — 3008F BODY MASS INDEX DOCD: CPT | Mod: CPTII,S$GLB,, | Performed by: PHYSICIAN ASSISTANT

## 2023-05-02 PROCEDURE — 4010F ACE/ARB THERAPY RXD/TAKEN: CPT | Mod: CPTII,S$GLB,, | Performed by: PHYSICIAN ASSISTANT

## 2023-05-02 PROCEDURE — 3078F PR MOST RECENT DIASTOLIC BLOOD PRESSURE < 80 MM HG: ICD-10-PCS | Mod: CPTII,S$GLB,, | Performed by: PHYSICIAN ASSISTANT

## 2023-05-02 PROCEDURE — 3074F PR MOST RECENT SYSTOLIC BLOOD PRESSURE < 130 MM HG: ICD-10-PCS | Mod: CPTII,S$GLB,, | Performed by: PHYSICIAN ASSISTANT

## 2023-05-02 PROCEDURE — 36415 COLL VENOUS BLD VENIPUNCTURE: CPT | Performed by: PHYSICIAN ASSISTANT

## 2023-05-02 RX ORDER — LOSARTAN POTASSIUM 25 MG/1
25 TABLET ORAL DAILY
Qty: 90 TABLET | Refills: 3 | Status: SHIPPED | OUTPATIENT
Start: 2023-05-02 | End: 2024-05-01

## 2023-05-02 RX ORDER — FUROSEMIDE 20 MG/1
20 TABLET ORAL 2 TIMES DAILY
Qty: 30 TABLET | Refills: 3 | Status: SHIPPED | OUTPATIENT
Start: 2023-05-02 | End: 2024-05-01

## 2023-05-02 NOTE — PROGRESS NOTES
[facility] Provider Note (Follow-up) Consult Note      HPI:  Patient can walk around house, gets SOB   Patient sleeps on 2 pillows   Patient wakes up SOB, has to get out of bed, associated cough, sputum and none            Palpitations - none   Dizzy, light-headed, pre-syncope or syncope none   Since discharge frequency of performing weights, home weight and weight change increased    Other information felt pertinent to HPI    Last seen in clinic in Jan, was supposed to f/u in 10 days. HAs not been seen by anyone but PCP since, not in our system. Entresto was not covered by insurance so she has been on losartan 12.5 daily. On lasix 20 daily but she has increased this to 20 BID and is now out of pills.     No PND, orthopnea. Some SOB with exertion         PHYSICAL: There were no vitals filed for this visit.   Wt Readings from Last 3 Encounters:   01/30/23 65 kg (143 lb 4.8 oz)   01/27/23 67.2 kg (148 lb 2.4 oz)   01/23/23 69.8 kg (153 lb 14.1 oz)       JVD: no,    Heart rhythm: regular  Cardiac murmur:     S3: no  S4: no  Lungs: clear  Liver span: 10 cm:   Hepatojugular reflux: no  Edema: no,       ASSESSMENT: chronic systolic HF    PLAN:      Patient Instructions:   Instruct the patient to notify this clinic if HH, a physician or an advanced care provider wants to change medication one of their HF medications   Activity and Diet restrictions:   Recommend 2-3 gram sodium restriction and 1500cc- 2000cc fluid restriction.  Encourage physical activity with graded exercise program.  Requested patient to weigh themselves daily, and to notify us if their weight increases by more than 3 lbs in 1 day or 5 lbs in 3 days.    Assigned dry weight on home scale: 65 kg  Medication changes (include current dose and changed dose)  Continue lasix 20 mg BID  Losartan 25 daily  Labs today   RTC 3 weeks

## 2023-06-13 ENCOUNTER — TELEPHONE (OUTPATIENT)
Dept: CARDIOLOGY | Facility: CLINIC | Age: 73
End: 2023-06-13
Payer: MEDICARE

## 2023-06-13 NOTE — TELEPHONE ENCOUNTER
I called pt to reschedule missed appt. No answer. No VM for me to leave a message for her to return my call.   Will continue to try to reach pt.

## 2023-06-15 ENCOUNTER — PATIENT MESSAGE (OUTPATIENT)
Dept: CARDIOLOGY | Facility: CLINIC | Age: 73
End: 2023-06-15
Payer: MEDICARE

## 2023-06-15 NOTE — TELEPHONE ENCOUNTER
I called pt to reschedule missed appt with Chelsea [facility]. No answer. No VM for me to leave a message. My chart message sent as well.

## 2024-10-03 DIAGNOSIS — I50.9 NEW ONSET OF CONGESTIVE HEART FAILURE: Primary | ICD-10-CM

## 2024-10-03 RX ORDER — LOSARTAN POTASSIUM 25 MG/1
25 TABLET ORAL DAILY
Qty: 30 TABLET | Refills: 4 | Status: SHIPPED | OUTPATIENT
Start: 2024-10-03 | End: 2025-10-03

## 2024-10-03 RX ORDER — FUROSEMIDE 20 MG/1
20 TABLET ORAL 2 TIMES DAILY
Qty: 60 TABLET | Refills: 4 | Status: SHIPPED | OUTPATIENT
Start: 2024-10-03 | End: 2025-10-03

## 2025-01-08 LAB
INFLUENZA A, MOLECULAR: NEGATIVE
INFLUENZA B, MOLECULAR: NEGATIVE
SARS-COV-2 RDRP RESP QL NAA+PROBE: NEGATIVE
SPECIMEN SOURCE: NORMAL

## 2025-01-08 PROCEDURE — 87502 INFLUENZA DNA AMP PROBE: CPT

## 2025-01-08 PROCEDURE — 99291 CRITICAL CARE FIRST HOUR: CPT

## 2025-01-08 PROCEDURE — 87635 SARS-COV-2 COVID-19 AMP PRB: CPT

## 2025-01-09 ENCOUNTER — HOSPITAL ENCOUNTER (INPATIENT)
Facility: HOSPITAL | Age: 75
LOS: 2 days | Discharge: HOME | DRG: 280 | End: 2025-01-11
Attending: EMERGENCY MEDICINE | Admitting: HOSPITALIST
Payer: MEDICARE

## 2025-01-09 DIAGNOSIS — R60.0 LEG EDEMA: ICD-10-CM

## 2025-01-09 DIAGNOSIS — I50.9 ACUTE EXACERBATION OF CHF (CONGESTIVE HEART FAILURE): ICD-10-CM

## 2025-01-09 DIAGNOSIS — I27.20 PULMONARY HYPERTENSION: ICD-10-CM

## 2025-01-09 DIAGNOSIS — I50.43 ACUTE ON CHRONIC COMBINED SYSTOLIC AND DIASTOLIC HEART FAILURE: Primary | ICD-10-CM

## 2025-01-09 DIAGNOSIS — I50.9 CHF EXACERBATION: ICD-10-CM

## 2025-01-09 DIAGNOSIS — J44.9 COPD SUGGESTED BY INITIAL EVALUATION: ICD-10-CM

## 2025-01-09 DIAGNOSIS — R00.0 TACHYCARDIA: ICD-10-CM

## 2025-01-09 PROBLEM — R94.31 ABNORMAL ECG: Status: ACTIVE | Noted: 2025-01-09

## 2025-01-09 PROBLEM — Z87.09 HISTORY OF COPD: Status: ACTIVE | Noted: 2025-01-09

## 2025-01-09 PROBLEM — I10 HYPERTENSION: Status: ACTIVE | Noted: 2025-01-09

## 2025-01-09 PROBLEM — I65.23 CAROTID STENOSIS, BILATERAL: Status: ACTIVE | Noted: 2017-10-04

## 2025-01-09 PROBLEM — I25.10 CAD, MULTIPLE VESSEL: Status: ACTIVE | Noted: 2025-01-09

## 2025-01-09 PROBLEM — I65.29 CAROTID STENOSIS: Status: ACTIVE | Noted: 2025-01-09

## 2025-01-09 PROBLEM — I34.0 NONRHEUMATIC MITRAL VALVE REGURGITATION: Status: ACTIVE | Noted: 2025-01-09

## 2025-01-09 PROBLEM — I65.29 CAROTID STENOSIS: Status: RESOLVED | Noted: 2025-01-09 | Resolved: 2025-01-09

## 2025-01-09 LAB
ALBUMIN SERPL BCP-MCNC: 3.5 G/DL (ref 3.5–5.2)
ALP SERPL-CCNC: 58 U/L (ref 40–150)
ALT SERPL W/O P-5'-P-CCNC: 13 U/L (ref 10–44)
ANION GAP SERPL CALC-SCNC: 13 MMOL/L (ref 8–16)
AORTIC ROOT ANNULUS: 3.12 CM
APTT PPP: 26.8 SEC (ref 21–32)
APTT PPP: 52.4 SEC (ref 21–32)
ASCENDING AORTA: 3.09 CM
AST SERPL-CCNC: 47 U/L (ref 10–40)
AV INDEX (PROSTH): 0.27
AV MEAN GRADIENT: 9 MMHG
AV PEAK GRADIENT: 11.6 MMHG
AV VALVE AREA BY VELOCITY RATIO: 0.8 CM²
AV VALVE AREA: 0.8 CM²
AV VELOCITY RATIO: 0.29
BASOPHILS # BLD AUTO: 0.02 K/UL (ref 0–0.2)
BASOPHILS # BLD AUTO: 0.02 K/UL (ref 0–0.2)
BASOPHILS NFR BLD: 0.3 % (ref 0–1.9)
BASOPHILS NFR BLD: 0.4 % (ref 0–1.9)
BILIRUB SERPL-MCNC: 1.2 MG/DL (ref 0.1–1)
BNP SERPL-MCNC: >4900 PG/ML (ref 0–99)
BSA FOR ECHO PROCEDURE: 1.68 M2
BUN SERPL-MCNC: 19 MG/DL (ref 8–23)
CALCIUM SERPL-MCNC: 9.5 MG/DL (ref 8.7–10.5)
CHLORIDE SERPL-SCNC: 103 MMOL/L (ref 95–110)
CO2 SERPL-SCNC: 26 MMOL/L (ref 23–29)
CREAT SERPL-MCNC: 1.3 MG/DL (ref 0.5–1.4)
CV ECHO LV RWT: 0.3 CM
DIFFERENTIAL METHOD BLD: ABNORMAL
DIFFERENTIAL METHOD BLD: ABNORMAL
DOP CALC AO PEAK VEL: 1.7 M/S
DOP CALC AO VTI: 35.7 CM
DOP CALC LVOT AREA: 2.8 CM2
DOP CALC LVOT DIAMETER: 1.9 CM
DOP CALC LVOT PEAK VEL: 0.5 M/S
DOP CALC LVOT STROKE VOLUME: 26.9 CM3
DOP CALC RVOT PEAK VEL: 0.45 M/S
DOP CALC RVOT VTI: 8.6 CM
DOP CALCLVOT PEAK VEL VTI: 9.5 CM
E WAVE DECELERATION TIME: 162.82 MSEC
E/A RATIO: 1.3
E/E' RATIO: 25.4 M/S
ECHO LV POSTERIOR WALL: 0.8 CM (ref 0.6–1.1)
EOSINOPHIL # BLD AUTO: 0 K/UL (ref 0–0.5)
EOSINOPHIL # BLD AUTO: 0 K/UL (ref 0–0.5)
EOSINOPHIL NFR BLD: 0.2 % (ref 0–8)
EOSINOPHIL NFR BLD: 0.5 % (ref 0–8)
ERYTHROCYTE [DISTWIDTH] IN BLOOD BY AUTOMATED COUNT: 15.1 % (ref 11.5–14.5)
ERYTHROCYTE [DISTWIDTH] IN BLOOD BY AUTOMATED COUNT: 15.4 % (ref 11.5–14.5)
EST. GFR  (NO RACE VARIABLE): 43 ML/MIN/1.73 M^2
ESTIMATED AVG GLUCOSE: 108 MG/DL (ref 68–131)
FRACTIONAL SHORTENING: 7.5 % (ref 28–44)
GLUCOSE SERPL-MCNC: 153 MG/DL (ref 70–110)
HBA1C MFR BLD: 5.4 % (ref 4–5.6)
HCT VFR BLD AUTO: 36.6 % (ref 37–48.5)
HCT VFR BLD AUTO: 39.2 % (ref 37–48.5)
HGB BLD-MCNC: 11.2 G/DL (ref 12–16)
HGB BLD-MCNC: 11.9 G/DL (ref 12–16)
IMM GRANULOCYTES # BLD AUTO: 0.02 K/UL (ref 0–0.04)
IMM GRANULOCYTES # BLD AUTO: 0.03 K/UL (ref 0–0.04)
IMM GRANULOCYTES NFR BLD AUTO: 0.4 % (ref 0–0.5)
IMM GRANULOCYTES NFR BLD AUTO: 0.5 % (ref 0–0.5)
INR PPP: 1.2 (ref 0.8–1.2)
INTERVENTRICULAR SEPTUM: 0.9 CM (ref 0.6–1.1)
IVC DIAMETER: 1.84 CM
IVRT: 79.92 MSEC
LA MAJOR: 5.47 CM
LA MINOR: 5.07 CM
LA WIDTH: 5.1 CM
LEFT ATRIUM SIZE: 3.95 CM
LEFT ATRIUM VOLUME INDEX: 54 ML/M2
LEFT ATRIUM VOLUME: 90.11 CM3
LEFT INTERNAL DIMENSION IN SYSTOLE: 4.9 CM (ref 2.1–4)
LEFT VENTRICLE DIASTOLIC VOLUME INDEX: 80.43 ML/M2
LEFT VENTRICLE DIASTOLIC VOLUME: 134.31 ML
LEFT VENTRICLE MASS INDEX: 97.1 G/M2
LEFT VENTRICLE SYSTOLIC VOLUME INDEX: 67.6 ML/M2
LEFT VENTRICLE SYSTOLIC VOLUME: 112.85 ML
LEFT VENTRICULAR INTERNAL DIMENSION IN DIASTOLE: 5.3 CM (ref 3.5–6)
LEFT VENTRICULAR MASS: 162.1 G
LV LATERAL E/E' RATIO: 21.17 M/S
LV SEPTAL E/E' RATIO: 31.75 M/S
LVED V (TEICH): 134.31 ML
LVES V (TEICH): 112.85 ML
LVOT MG: 0.68 MMHG
LVOT MV: 0.4 CM/S
LYMPHOCYTES # BLD AUTO: 0.7 K/UL (ref 1–4.8)
LYMPHOCYTES # BLD AUTO: 1.2 K/UL (ref 1–4.8)
LYMPHOCYTES NFR BLD: 12 % (ref 18–48)
LYMPHOCYTES NFR BLD: 22.3 % (ref 18–48)
MAGNESIUM SERPL-MCNC: 2.4 MG/DL (ref 1.6–2.6)
MCH RBC QN AUTO: 28.9 PG (ref 27–31)
MCH RBC QN AUTO: 29.2 PG (ref 27–31)
MCHC RBC AUTO-ENTMCNC: 30.4 G/DL (ref 32–36)
MCHC RBC AUTO-ENTMCNC: 30.6 G/DL (ref 32–36)
MCV RBC AUTO: 95 FL (ref 82–98)
MCV RBC AUTO: 96 FL (ref 82–98)
MONOCYTES # BLD AUTO: 0.5 K/UL (ref 0.3–1)
MONOCYTES # BLD AUTO: 0.6 K/UL (ref 0.3–1)
MONOCYTES NFR BLD: 10.6 % (ref 4–15)
MONOCYTES NFR BLD: 8 % (ref 4–15)
MV PEAK A VEL: 0.98 M/S
MV PEAK E VEL: 1.27 M/S
NEUTROPHILS # BLD AUTO: 3.6 K/UL (ref 1.8–7.7)
NEUTROPHILS # BLD AUTO: 4.5 K/UL (ref 1.8–7.7)
NEUTROPHILS NFR BLD: 66.1 % (ref 38–73)
NEUTROPHILS NFR BLD: 78.7 % (ref 38–73)
NRBC BLD-RTO: 0 /100 WBC
NRBC BLD-RTO: 0 /100 WBC
OHS QRS DURATION: 116 MS
OHS QTC CALCULATION: 460 MS
PISA MRMAX VEL: 4.58 M/S
PISA TR MAX VEL: 3.83 M/S
PLATELET # BLD AUTO: 149 K/UL (ref 150–450)
PLATELET # BLD AUTO: 163 K/UL (ref 150–450)
PMV BLD AUTO: 10.9 FL (ref 9.2–12.9)
PMV BLD AUTO: 11.3 FL (ref 9.2–12.9)
POTASSIUM SERPL-SCNC: 4.5 MMOL/L (ref 3.5–5.1)
PROT SERPL-MCNC: 7 G/DL (ref 6–8.4)
PROTHROMBIN TIME: 13.9 SEC (ref 9–12.5)
PULM VEIN S/D RATIO: 2.24
PV MEAN GRADIENT: 1 MMHG
PV PEAK D VEL: 0.29 M/S
PV PEAK S VEL: 0.65 M/S
RA MAJOR: 5.46 CM
RA PRESSURE ESTIMATED: 8 MMHG
RA WIDTH: 5.1 CM
RBC # BLD AUTO: 3.87 M/UL (ref 4–5.4)
RBC # BLD AUTO: 4.07 M/UL (ref 4–5.4)
RV TB RVSP: 12 MMHG
SODIUM SERPL-SCNC: 142 MMOL/L (ref 136–145)
STJ: 3 CM
T4 FREE SERPL-MCNC: 0.96 NG/DL (ref 0.71–1.51)
TDI LATERAL: 0.06 M/S
TDI SEPTAL: 0.04 M/S
TDI: 0.05 M/S
TR MAX PG: 59 MMHG
TRICUSPID ANNULAR PLANE SYSTOLIC EXCURSION: 2.16 CM
TROPONIN I SERPL DL<=0.01 NG/ML-MCNC: 13.55 NG/ML (ref 0–0.03)
TROPONIN I SERPL DL<=0.01 NG/ML-MCNC: 3.04 NG/ML (ref 0–0.03)
TROPONIN I SERPL DL<=0.01 NG/ML-MCNC: 6.26 NG/ML (ref 0–0.03)
TROPONIN I SERPL DL<=0.01 NG/ML-MCNC: 6.26 NG/ML (ref 0–0.03)
TSH SERPL DL<=0.005 MIU/L-ACNC: 5.53 UIU/ML (ref 0.4–4)
TV REST PULMONARY ARTERY PRESSURE: 67 MMHG
WBC # BLD AUTO: 5.37 K/UL (ref 3.9–12.7)
WBC # BLD AUTO: 5.75 K/UL (ref 3.9–12.7)
Z-SCORE OF LEFT VENTRICULAR DIMENSION IN END DIASTOLE: 1.27
Z-SCORE OF LEFT VENTRICULAR DIMENSION IN END SYSTOLE: 4.17

## 2025-01-09 PROCEDURE — 25000003 PHARM REV CODE 250: Performed by: INTERNAL MEDICINE

## 2025-01-09 PROCEDURE — 21400001 HC TELEMETRY ROOM

## 2025-01-09 PROCEDURE — 84443 ASSAY THYROID STIM HORMONE: CPT | Performed by: NURSE PRACTITIONER

## 2025-01-09 PROCEDURE — 83735 ASSAY OF MAGNESIUM: CPT | Performed by: NURSE PRACTITIONER

## 2025-01-09 PROCEDURE — 93010 ELECTROCARDIOGRAM REPORT: CPT | Mod: ,,, | Performed by: INTERNAL MEDICINE

## 2025-01-09 PROCEDURE — 25000003 PHARM REV CODE 250: Performed by: EMERGENCY MEDICINE

## 2025-01-09 PROCEDURE — 85730 THROMBOPLASTIN TIME PARTIAL: CPT | Mod: 91 | Performed by: FAMILY MEDICINE

## 2025-01-09 PROCEDURE — 83880 ASSAY OF NATRIURETIC PEPTIDE: CPT

## 2025-01-09 PROCEDURE — 85610 PROTHROMBIN TIME: CPT | Performed by: INTERNAL MEDICINE

## 2025-01-09 PROCEDURE — 84484 ASSAY OF TROPONIN QUANT: CPT

## 2025-01-09 PROCEDURE — 84484 ASSAY OF TROPONIN QUANT: CPT | Mod: 91 | Performed by: NURSE PRACTITIONER

## 2025-01-09 PROCEDURE — 25000003 PHARM REV CODE 250: Performed by: FAMILY MEDICINE

## 2025-01-09 PROCEDURE — 93005 ELECTROCARDIOGRAM TRACING: CPT

## 2025-01-09 PROCEDURE — 36415 COLL VENOUS BLD VENIPUNCTURE: CPT | Performed by: FAMILY MEDICINE

## 2025-01-09 PROCEDURE — 99223 1ST HOSP IP/OBS HIGH 75: CPT | Mod: ,,, | Performed by: INTERNAL MEDICINE

## 2025-01-09 PROCEDURE — 80053 COMPREHEN METABOLIC PANEL: CPT

## 2025-01-09 PROCEDURE — 85025 COMPLETE CBC W/AUTO DIFF WBC: CPT | Mod: 91 | Performed by: FAMILY MEDICINE

## 2025-01-09 PROCEDURE — 63600175 PHARM REV CODE 636 W HCPCS: Performed by: FAMILY MEDICINE

## 2025-01-09 PROCEDURE — 25000003 PHARM REV CODE 250: Performed by: NURSE PRACTITIONER

## 2025-01-09 PROCEDURE — 84439 ASSAY OF FREE THYROXINE: CPT | Performed by: NURSE PRACTITIONER

## 2025-01-09 PROCEDURE — 84484 ASSAY OF TROPONIN QUANT: CPT | Mod: 91 | Performed by: HOSPITALIST

## 2025-01-09 PROCEDURE — 85025 COMPLETE CBC W/AUTO DIFF WBC: CPT

## 2025-01-09 PROCEDURE — 83036 HEMOGLOBIN GLYCOSYLATED A1C: CPT | Performed by: NURSE PRACTITIONER

## 2025-01-09 PROCEDURE — 85730 THROMBOPLASTIN TIME PARTIAL: CPT | Performed by: INTERNAL MEDICINE

## 2025-01-09 PROCEDURE — 63600175 PHARM REV CODE 636 W HCPCS: Performed by: INTERNAL MEDICINE

## 2025-01-09 RX ORDER — IPRATROPIUM BROMIDE AND ALBUTEROL SULFATE 2.5; .5 MG/3ML; MG/3ML
3 SOLUTION RESPIRATORY (INHALATION) EVERY 4 HOURS PRN
Status: DISCONTINUED | OUTPATIENT
Start: 2025-01-09 | End: 2025-01-11 | Stop reason: HOSPADM

## 2025-01-09 RX ORDER — FUROSEMIDE 10 MG/ML
40 INJECTION INTRAMUSCULAR; INTRAVENOUS EVERY 12 HOURS
Status: DISCONTINUED | OUTPATIENT
Start: 2025-01-09 | End: 2025-01-09

## 2025-01-09 RX ORDER — POTASSIUM CHLORIDE 20 MEQ/1
20 TABLET, EXTENDED RELEASE ORAL EVERY OTHER DAY
Status: DISCONTINUED | OUTPATIENT
Start: 2025-01-09 | End: 2025-01-11 | Stop reason: HOSPADM

## 2025-01-09 RX ORDER — LOSARTAN POTASSIUM 25 MG/1
25 TABLET ORAL DAILY
Status: DISCONTINUED | OUTPATIENT
Start: 2025-01-09 | End: 2025-01-09

## 2025-01-09 RX ORDER — ENOXAPARIN SODIUM 100 MG/ML
40 INJECTION SUBCUTANEOUS EVERY 24 HOURS
Status: DISCONTINUED | OUTPATIENT
Start: 2025-01-09 | End: 2025-01-09 | Stop reason: ALTCHOICE

## 2025-01-09 RX ORDER — ACETAMINOPHEN 325 MG/1
650 TABLET ORAL EVERY 6 HOURS PRN
Status: DISCONTINUED | OUTPATIENT
Start: 2025-01-09 | End: 2025-01-11 | Stop reason: HOSPADM

## 2025-01-09 RX ORDER — SODIUM CHLORIDE 0.9 % (FLUSH) 0.9 %
10 SYRINGE (ML) INJECTION
Status: DISCONTINUED | OUTPATIENT
Start: 2025-01-09 | End: 2025-01-11 | Stop reason: HOSPADM

## 2025-01-09 RX ORDER — HEPARIN SODIUM,PORCINE/D5W 25000/250
0-40 INTRAVENOUS SOLUTION INTRAVENOUS CONTINUOUS
Status: DISCONTINUED | OUTPATIENT
Start: 2025-01-09 | End: 2025-01-11

## 2025-01-09 RX ORDER — ASPIRIN 325 MG
325 TABLET ORAL
Status: COMPLETED | OUTPATIENT
Start: 2025-01-09 | End: 2025-01-09

## 2025-01-09 RX ORDER — ESCITALOPRAM OXALATE 10 MG/1
10 TABLET ORAL DAILY
Status: DISCONTINUED | OUTPATIENT
Start: 2025-01-09 | End: 2025-01-11 | Stop reason: HOSPADM

## 2025-01-09 RX ORDER — PANTOPRAZOLE SODIUM 40 MG/1
40 TABLET, DELAYED RELEASE ORAL 2 TIMES DAILY
Status: DISCONTINUED | OUTPATIENT
Start: 2025-01-09 | End: 2025-01-11 | Stop reason: HOSPADM

## 2025-01-09 RX ORDER — CLOPIDOGREL BISULFATE 75 MG/1
75 TABLET ORAL DAILY
Status: DISCONTINUED | OUTPATIENT
Start: 2025-01-09 | End: 2025-01-11 | Stop reason: HOSPADM

## 2025-01-09 RX ORDER — CLOPIDOGREL BISULFATE 75 MG/1
1 TABLET, FILM COATED ORAL DAILY
COMMUNITY
Start: 2024-10-10

## 2025-01-09 RX ORDER — ONDANSETRON 4 MG/1
4 TABLET, ORALLY DISINTEGRATING ORAL EVERY 8 HOURS PRN
Status: DISCONTINUED | OUTPATIENT
Start: 2025-01-09 | End: 2025-01-11 | Stop reason: HOSPADM

## 2025-01-09 RX ORDER — ASPIRIN 81 MG/1
81 TABLET ORAL DAILY
Status: DISCONTINUED | OUTPATIENT
Start: 2025-01-09 | End: 2025-01-11 | Stop reason: HOSPADM

## 2025-01-09 RX ORDER — METOPROLOL SUCCINATE 25 MG/1
1 TABLET, EXTENDED RELEASE ORAL DAILY
COMMUNITY
Start: 2024-10-10

## 2025-01-09 RX ORDER — ATORVASTATIN CALCIUM 10 MG/1
20 TABLET, FILM COATED ORAL DAILY
Status: DISCONTINUED | OUTPATIENT
Start: 2025-01-09 | End: 2025-01-11 | Stop reason: HOSPADM

## 2025-01-09 RX ADMIN — CLOPIDOGREL BISULFATE 75 MG: 75 TABLET ORAL at 09:01

## 2025-01-09 RX ADMIN — ESCITALOPRAM OXALATE 10 MG: 10 TABLET ORAL at 09:01

## 2025-01-09 RX ADMIN — ASPIRIN 81 MG: 81 TABLET, COATED ORAL at 09:01

## 2025-01-09 RX ADMIN — HEPARIN SODIUM 12 UNITS/KG/HR: 10000 INJECTION, SOLUTION INTRAVENOUS at 02:01

## 2025-01-09 RX ADMIN — FUROSEMIDE 5 MG/HR: 10 INJECTION, SOLUTION INTRAMUSCULAR; INTRAVENOUS at 11:01

## 2025-01-09 RX ADMIN — PANTOPRAZOLE SODIUM 40 MG: 40 TABLET, DELAYED RELEASE ORAL at 09:01

## 2025-01-09 RX ADMIN — ASPIRIN 325 MG ORAL TABLET 325 MG: 325 PILL ORAL at 02:01

## 2025-01-09 RX ADMIN — ATORVASTATIN CALCIUM 20 MG: 10 TABLET, FILM COATED ORAL at 09:01

## 2025-01-09 RX ADMIN — ONDANSETRON 4 MG: 4 TABLET, ORALLY DISINTEGRATING ORAL at 07:01

## 2025-01-09 RX ADMIN — POTASSIUM CHLORIDE 20 MEQ: 1500 TABLET, EXTENDED RELEASE ORAL at 09:01

## 2025-01-09 RX ADMIN — METOPROLOL SUCCINATE 12.5 MG: 25 TABLET, EXTENDED RELEASE ORAL at 09:01

## 2025-01-09 NOTE — ASSESSMENT & PLAN NOTE
Patient is chronically on statin.will continue for now. Last Lipid Panel:   Lab Results   Component Value Date    CHOL 166 10/01/2024    HDL 42 (L) 10/01/2024    LDLCALC 112 10/01/2024    TRIG 59 10/01/2024    CHOLHDL 34.1 01/20/2023     Plan:  -Continue home medication  -low fat/low calorie diet

## 2025-01-09 NOTE — CARE UPDATE
Evaluated patient at bedside    Admitted for congestive heart failure exacerbation   Wears supplemental oxygen at baseline, approximately 5L  Baseline oxygen sats per daughter ranges from 92-95%  Noticed edema  Has a happy meal every day    No acute distress  Tachypnea on 5L nasal cannula  Murmur   Frail  Ill appearing  Voice change  Alert   Bilateral lower extremity edema   Alert  Mentation at baseline    Daughter and granddaughter at bedside    Continue optimizing medication(s) for congestive heart failure exacerbation   Discussed code status   Remains full code

## 2025-01-09 NOTE — ED PROVIDER NOTES
SCRIBE #1 NOTE: I, Phyllis Van, am scribing for, and in the presence of, Galina Bowen MD. I have scribed the entire note.       History     Chief Complaint   Patient presents with    Epistaxis     Moderate nose bleeding when blowing onset ~20 mins PTA; runny nose, congestion; onset 1 week ago    Emesis     N/V/D onset 1 day ago; hx of CHF, COPD uses 5L nc at home     Review of patient's allergies indicates:   Allergen Reactions    Penicillins Hives    Bactrim [sulfamethoxazole-trimethoprim] Hives    Batroxobin Hives    Nitrofurantoin monohyd/m-cryst Itching    Codeine Rash         History of Present Illness     HPI    1/9/2025, 1:21 AM  History obtained from the  granddaughter and patient      History of Present Illness: Violeta Scherer is a 74 y.o. female patient with a PMHx of HTN, CVA, TIA, PE, CHF, HLD, GERD, and COPD who presents to the Emergency Department for evaluation of N/V/D which onset gradually 1 day ago. Pt's granddaughter noticed pt has been weak and requiring more assistance for the past day. She also reports rhinorrhea and congestion which onset about a week ago but worsened yesterday. Pt's granddaughter reports epistaxis onset after blowing nose about 20 minutes PTA. Symptoms are constant and moderate in severity. No mitigating or exacerbating factors reported. Associated sxs include leg swelling and SOB. Patient denies any chest pain, abdominal pain, myalgias, headache, fever, and all other sxs at this time. No prior Tx reported. Pt uses O2 at home. No further complaints or concerns at this time.       Arrival mode: Personal vehicle    PCP: Keara Lee FNP        Past Medical History:  Past Medical History:   Diagnosis Date    Hypertension     Other pulmonary embolism without acute cor pulmonale     approx greater than 4 years    Stroke     TIA       Past Surgical History:  Past Surgical History:   Procedure Laterality Date    LEFT HEART CATHETERIZATION Left 1/22/2023    Procedure:  CATHETERIZATION, HEART, LEFT;  Surgeon: Elias Brown MD;  Location: [facility] CATH LAB;  Service: Cardiology;  Laterality: Left;    TONSILLECTOMY           Family History:  No family history on file.    Social History:  Social History     Tobacco Use    Smoking status: Former     Current packs/day: 0.00     Average packs/day: 3.0 packs/day for 35.0 years (105.0 ttl pk-yrs)     Types: Cigarettes     Start date:      Quit date:      Years since quittin.0    Smokeless tobacco: Not on file   Substance and Sexual Activity    Alcohol use: Not on file    Drug use: No    Sexual activity: Not on file        Review of Systems     Review of Systems   Constitutional:  Negative for fever.   HENT:  Positive for congestion, nosebleeds and rhinorrhea. Negative for sore throat.    Respiratory:  Positive for shortness of breath.    Cardiovascular:  Positive for leg swelling. Negative for chest pain.   Gastrointestinal:  Negative for abdominal pain and nausea.   Genitourinary:  Negative for dysuria.   Musculoskeletal:  Negative for back pain and myalgias.   Skin:  Negative for rash.   Neurological:  Positive for weakness (generalized). Negative for headaches.   Hematological:  Does not bruise/bleed easily.   All other systems reviewed and are negative.     Physical Exam     Initial Vitals [25 2219]   BP Pulse Resp Temp SpO2   110/69 88 20 97.9 °F (36.6 °C) 97 %      MAP       --          Physical Exam  Nursing Notes and Vital Signs Reviewed.  Constitutional: Patient is in no apparent distress. Well-developed and well-nourished.  Head: Atraumatic. Normocephalic.  Eyes: PERRL. EOM intact. Conjunctivae are not pale. No scleral icterus.  ENT: Mucous membranes are moist. Oropharynx is clear and symmetric.    Neck: Supple. Full ROM. No lymphadenopathy.  Cardiovascular: Regular rate. Regular rhythm. Heart murmur detected at 4th intercostal space. Distal pulses are 2+ and symmetric.  Pulmonary/Chest: No respiratory  distress. Clear to auscultation bilaterally. No wheezing or rales.  Abdominal: Soft and non-distended.  There is no tenderness.  No rebound, guarding, or rigidity. Good bowel sounds.  Genitourinary: No CVA tenderness  Musculoskeletal: Moves all extremities. No obvious deformities. 1+ edema to bilateral lower extremities. No calf tenderness.  Skin: Warm and dry.  Neurological:  Alert, awake, and appropriate.  Normal speech.  No acute focal neurological deficits are appreciated.  Psychiatric: Normal affect. Good eye contact. Appropriate in content.     ED Course   Critical Care    Date/Time: 1/9/2025 2:55 AM    Performed by: Galina Bowen MD  Authorized by: Galina Bowen MD  Direct patient critical care time: 15 minutes  Additional history critical care time: 10 minutes  Ordering / reviewing critical care time: 5 minutes  Documentation critical care time: 5 minutes  Consulting other physicians critical care time: 5 minutes  Other critical care time: 5 minutes  Total critical care time (exclusive of procedural time) : 45 minutes  Critical care time was exclusive of separately billable procedures and treating other patients and teaching time.  Critical care was necessary to treat or prevent imminent or life-threatening deterioration of the following conditions: NSTEMI and CHF.  Critical care was time spent personally by me on the following activities: blood draw for specimens, development of treatment plan with patient or surrogate, discussions with consultants, interpretation of cardiac output measurements, evaluation of patient's response to treatment, examination of patient, ordering and performing treatments and interventions, obtaining history from patient or surrogate, ordering and review of radiographic studies, ordering and review of laboratory studies, pulse oximetry, review of old charts and re-evaluation of patient's condition.        ED Vital Signs:  Vitals:    01/08/25 2219 01/09/25 0030 01/09/25 0033  01/09/25 0418   BP: 110/69 112/60 106/66 98/62   Pulse: 88 84 83 77   Resp: 20   20   Temp: 97.9 °F (36.6 °C)      TempSrc: Oral      SpO2: 97% 100% 100% 99%       Abnormal Lab Results:  Labs Reviewed   CBC W/ AUTO DIFFERENTIAL - Abnormal       Result Value    WBC 5.75      RBC 4.07      Hemoglobin 11.9 (*)     Hematocrit 39.2      MCV 96      MCH 29.2      MCHC 30.4 (*)     RDW 15.4 (*)     Platelets 163      MPV 10.9      Immature Granulocytes 0.5      Gran # (ANC) 4.5      Immature Grans (Abs) 0.03      Lymph # 0.7 (*)     Mono # 0.5      Eos # 0.0      Baso # 0.02      nRBC 0      Gran % 78.7 (*)     Lymph % 12.0 (*)     Mono % 8.0      Eosinophil % 0.5      Basophil % 0.3      Differential Method Automated     COMPREHENSIVE METABOLIC PANEL - Abnormal    Sodium 142      Potassium 4.5      Chloride 103      CO2 26      Glucose 153 (*)     BUN 19      Creatinine 1.3      Calcium 9.5      Total Protein 7.0      Albumin 3.5      Total Bilirubin 1.2 (*)     Alkaline Phosphatase 58      AST 47 (*)     ALT 13      eGFR 43 (*)     Anion Gap 13     B-TYPE NATRIURETIC PEPTIDE - Abnormal    BNP >4,900 (*)    TROPONIN I - Abnormal    Troponin I 3.045 (*)    TROPONIN I - Abnormal    Troponin I 6.258 (*)    TROPONIN I - Abnormal    Troponin I 6.258 (*)    TSH - Abnormal    TSH 5.532 (*)     Narrative:     If not completed within last 6 months   INFLUENZA A & B BY MOLECULAR    Influenza A, Molecular Negative      Influenza B, Molecular Negative      Flu A & B Source Nasal swab     SARS-COV-2 RNA AMPLIFICATION, QUAL    SARS-CoV-2 RNA, Amplification, Qual Negative     MAGNESIUM    Magnesium 2.4      Narrative:     If not completed within last 6 months   T4, FREE    Free T4 0.96      Narrative:     If not completed within last 6 months   HEMOGLOBIN A1C   TROPONIN I        All Lab Results:  Results for orders placed or performed during the hospital encounter of 01/09/25   Influenza A & B by Molecular    Collection Time: 01/08/25  10:22 PM    Specimen: Nasopharyngeal Swab   Result Value Ref Range    Influenza A, Molecular Negative Negative    Influenza B, Molecular Negative Negative    Flu A & B Source Nasal swab    COVID-19 Rapid Screening    Collection Time: 01/08/25 10:22 PM   Result Value Ref Range    SARS-CoV-2 RNA, Amplification, Qual Negative Negative   CBC auto differential    Collection Time: 01/09/25 12:52 AM   Result Value Ref Range    WBC 5.75 3.90 - 12.70 K/uL    RBC 4.07 4.00 - 5.40 M/uL    Hemoglobin 11.9 (L) 12.0 - 16.0 g/dL    Hematocrit 39.2 37.0 - 48.5 %    MCV 96 82 - 98 fL    MCH 29.2 27.0 - 31.0 pg    MCHC 30.4 (L) 32.0 - 36.0 g/dL    RDW 15.4 (H) 11.5 - 14.5 %    Platelets 163 150 - 450 K/uL    MPV 10.9 9.2 - 12.9 fL    Immature Granulocytes 0.5 0.0 - 0.5 %    Gran # (ANC) 4.5 1.8 - 7.7 K/uL    Immature Grans (Abs) 0.03 0.00 - 0.04 K/uL    Lymph # 0.7 (L) 1.0 - 4.8 K/uL    Mono # 0.5 0.3 - 1.0 K/uL    Eos # 0.0 0.0 - 0.5 K/uL    Baso # 0.02 0.00 - 0.20 K/uL    nRBC 0 0 /100 WBC    Gran % 78.7 (H) 38.0 - 73.0 %    Lymph % 12.0 (L) 18.0 - 48.0 %    Mono % 8.0 4.0 - 15.0 %    Eosinophil % 0.5 0.0 - 8.0 %    Basophil % 0.3 0.0 - 1.9 %    Differential Method Automated    Comprehensive metabolic panel    Collection Time: 01/09/25 12:52 AM   Result Value Ref Range    Sodium 142 136 - 145 mmol/L    Potassium 4.5 3.5 - 5.1 mmol/L    Chloride 103 95 - 110 mmol/L    CO2 26 23 - 29 mmol/L    Glucose 153 (H) 70 - 110 mg/dL    BUN 19 8 - 23 mg/dL    Creatinine 1.3 0.5 - 1.4 mg/dL    Calcium 9.5 8.7 - 10.5 mg/dL    Total Protein 7.0 6.0 - 8.4 g/dL    Albumin 3.5 3.5 - 5.2 g/dL    Total Bilirubin 1.2 (H) 0.1 - 1.0 mg/dL    Alkaline Phosphatase 58 40 - 150 U/L    AST 47 (H) 10 - 40 U/L    ALT 13 10 - 44 U/L    eGFR 43 (A) >60 mL/min/1.73 m^2    Anion Gap 13 8 - 16 mmol/L   Brain natriuretic peptide    Collection Time: 01/09/25 12:52 AM   Result Value Ref Range    BNP >4,900 (H) 0 - 99 pg/mL   Troponin I    Collection Time: 01/09/25  12:52 AM   Result Value Ref Range    Troponin I 3.045 (H) 0.000 - 0.026 ng/mL   Troponin I    Collection Time: 01/09/25  3:00 AM   Result Value Ref Range    Troponin I 6.258 (H) 0.000 - 0.026 ng/mL   Troponin I    Collection Time: 01/09/25  3:00 AM   Result Value Ref Range    Troponin I 6.258 (H) 0.000 - 0.026 ng/mL   Magnesium    Collection Time: 01/09/25  3:00 AM   Result Value Ref Range    Magnesium 2.4 1.6 - 2.6 mg/dL   TSH    Collection Time: 01/09/25  3:00 AM   Result Value Ref Range    TSH 5.532 (H) 0.400 - 4.000 uIU/mL   T4, free    Collection Time: 01/09/25  3:00 AM   Result Value Ref Range    Free T4 0.96 0.71 - 1.51 ng/dL         Imaging Results:  Imaging Results              X-Ray Chest AP Portable (Final result)  Result time 01/08/25 22:59:01      Final result by Ganesh Leung MD (01/08/25 22:59:01)                   Impression:      As above      Electronically signed by: Ganesh Leung  Date:    01/08/2025  Time:    22:59               Narrative:    EXAMINATION:  XR CHEST AP PORTABLE    CLINICAL HISTORY:  Localized edema    TECHNIQUE:  Single frontal view of the chest was performed.    COMPARISON:  None    FINDINGS:  Cardiomegaly with perihilar interstitial opacities.  Elevated left hemidiaphragm.  Similar findings to prior exam.  Correlate clinically to stable CHF.    Bones are intact.                                       The EKG was ordered, reviewed, and independently interpreted by the ED provider.  Interpretation time: 01:09  Rate: 81 BPM  Rhythm: normal sinus rhythm  Interpretation: LVH with QRS widening and repolarization abnormality (R in aVL, Harman product, Romhilt-Ceballos). Inferior infarct, age undetermined. ACUTE MI/STEMI. Consider right ventricular involvement in acute inferior infarct.         The Emergency Provider reviewed the vital signs and test results, which are outlined above.     ED Discussion     1:32 AM: Dr. Elias Brown (Cardiology) reviewed pt's EKG. He states the MI  is old, and the age is undetermined.    2:34 AM: Discussed case with Alex Arnold MD ([facility]). Dr. Arnold agrees with current care and management of pt and accepts admission.   Admitting Service: Internal Medicine  Admitting Physician: Dr. Arnold  Admit to: Inpatient: Med/Tele    2:34 AM: Re-evaluated pt. I have discussed test results, shared treatment plan, and the need for admission with patient and family at bedside. Pt and family express understanding at this time and agree with all information. All questions answered. Pt and family have no further questions or concerns at this time. Pt is ready for admit.         Medical Decision Making  Amount and/or Complexity of Data Reviewed  Independent Historian: caregiver     Details: Pt's granddaughter provided details about present illness.   Labs: ordered. Decision-making details documented in ED Course.  Radiology: ordered. Decision-making details documented in ED Course.  ECG/medicine tests: ordered and independent interpretation performed. Decision-making details documented in ED Course.    Risk  OTC drugs.  Decision regarding hospitalization.    Critical Care  Total time providing critical care: 45 minutes                ED Medication(s):  Medications   sodium chloride 0.9% flush 10 mL (has no administration in time range)   enoxaparin injection 40 mg (has no administration in time range)   furosemide injection 40 mg (has no administration in time range)   acetaminophen tablet 650 mg (has no administration in time range)   aspirin tablet 325 mg (325 mg Oral Given 1/9/25 0211)       New Prescriptions    No medications on file               Scribe Attestation:   Scribe #1: I performed the above scribed service and the documentation accurately describes the services I performed. I attest to the accuracy of the note.     Attending:   Physician Attestation Statement for Scribe #1: IJessi Si T., MD, personally performed the services described in  this documentation, as scribed by Phyllis Van, in my presence, and it is both accurate and complete.           Clinical Impression       ICD-10-CM ICD-9-CM   1. Tachycardia  R00.0 785.0   2. Leg edema  R60.0 782.3   3. Acute exacerbation of CHF (congestive heart failure)  I50.9 428.0   4. CHF exacerbation  I50.9 428.0       Disposition:   Disposition: Admitted  Condition: Galina Sullivan MD  01/09/25 0546

## 2025-01-09 NOTE — HPI
Violeta Scherer is a 74 y.o. female with a PMH  has a past medical history of Hypertension, Other pulmonary embolism without acute cor pulmonale, and Stroke.presented to the Emergency Department for evaluation of N/V/D, general weakness and bilateral leg edema which onset gradually 1 day ago. Pt's granddaughter noticed pt has been weak and requiring more assistance for the past day. She also reports rhinorrhea and congestion which onset about a week ago but worsened yesterday. Pt's granddaughter reports epistaxis onset after blowing nose about 20 minutes PTA. Symptoms are constant and moderate in severity. No mitigating or exacerbating factors reported. Associated sxs include leg swelling and SOB. Patient denies any chest pain, abdominal pain, myalgias, headache, fever, and all other sxs at this time. No prior Tx reported. Pt uses O2 between 2-4L/min via NC at home. No further complaints or concerns at this time.     ER workup revealed CBC to be unremarkable.  CMP revealed  mg/dL.  BNP>4,900.  Troponin 3.045.  Vitals stating.  325 mg aspirin given.  Chest x-ray cardiomegaly with perihilar interstitial opacities with CHF.  EKG was sinus rhythm with ventricular rate 81 beats per minute and a QT/QTC of 396/460.  Cardiology consulted.  We will see in a.m. as consult.  Recommend Hospital Medicine admit for NSTEMI.  Patient in agreement with treatment plan.  Patient admitted under inpatient status.        PCP: Keara Lee

## 2025-01-09 NOTE — ASSESSMENT & PLAN NOTE
Patient has  unspecified  heart failure that is Acute on chronic. On presentation their CHF was decompensated. Evidence of decompensated CHF on presentation includes: edema, orthopnea, paroxysmal nocturnal dyspnea (PND), dyspnea on exertion (KO), and shortness of breath. The etiology of their decompensation is likely dietary indiscretion. Most recent BNP and echo results are listed below.  Recent Labs     01/09/25  0052   BNP >4,900*     Latest ECHO  Results for orders placed during the hospital encounter of 01/19/23    Echo    Interpretation Summary  · The left ventricle is moderately enlarged with concentric hypertrophy and severely decreased systolic function.  · The estimated ejection fraction is 25%.  · Grade II left ventricular diastolic dysfunction.  · There is severe left ventricular global hypokinesis.  · Normal right ventricular size with normal right ventricular systolic function.  · There is mild-to-moderate aortic valve stenosis.  · Aortic valve area is 0.88 cm2; peak velocity is 2.49 m/s; mean gradient is 17 mmHg.  · Moderate mitral regurgitation.  · Moderate tricuspid regurgitation.  · Elevated central venous pressure (15 mmHg).  · The estimated PA systolic pressure is 61 mmHg.  · There is pulmonary hypertension.  · Trivial circumferential pericardial effusion. Effusion is fluid.    Current Heart Failure Medications  furosemide injection 40 mg, Every 12 hours, Intravenous    Plan  - Monitor strict I&Os and daily weights.    - Place on telemetry  - Low sodium diet  - Place on fluid restriction of 2 L.   - Cardiology has been consulted  - The patient's volume status is improving but not at their baseline as indicated by edema, orthopnea, paroxysmal nocturnal dyspnea (PND), dyspnea on exertion (KO), and shortness of breath

## 2025-01-09 NOTE — PHARMACY MED REC
"Admission Medication History     The home medication history was taken by Irina Jacobs.    You may go to "Admission" then "Reconcile Home Medications" tabs to review and/or act upon these items.     The home medication list has been updated by the Pharmacy department.   Please read ALL comments highlighted in yellow.   Please address this information as you see fit.    Feel free to contact us if you have any questions or require assistance.      The medications listed below were removed from the home medication list. Please reorder if appropriate:  Patient reports no longer taking the following medication(s):  Norco 7.5-325 mg        Medications listed below were obtained from: Patient/family and Analytic software- Pratibha Mcfarland (daughter at bedside)      LAST MED REC COMPLETED:     Irinaalissa Jacobs  [id number]    Current Outpatient Medications on File Prior to Encounter   Medication Sig Dispense Refill Last Dose/Taking    albuterol (PROVENTIL/VENTOLIN HFA) 90 mcg/actuation inhaler Inhale into the lungs daily as needed.   1/8/2025    aspirin (ECOTRIN) 81 MG EC tablet Take 1 tablet (81 mg total) by mouth once daily. 90 tablet 3 1/8/2025    atorvastatin (LIPITOR) 20 MG tablet Take 20 mg by mouth once daily.   1/8/2025    docusate sodium (COLACE) 100 MG capsule Take 1 capsule (100 mg total) by mouth 2 (two) times daily as needed for Constipation. 30 capsule 0 1/8/2025    EScitalopram oxalate (LEXAPRO) 10 MG tablet Take 10 mg by mouth once daily.   1/8/2025    fluticasone-umeclidin-vilanter (TRELEGY ELLIPTA) 100-62.5-25 mcg DsDv Inhale 1 puff into the lungs once daily.   1/8/2025    furosemide (LASIX) 20 MG tablet Take 1 tablet (20 mg total) by mouth 2 (two) times a day. 60 tablet 4 1/8/2025    losartan (COZAAR) 25 MG tablet Take 1 tablet (25 mg total) by mouth once daily. 30 tablet 4 1/8/2025    metoprolol succinate (TOPROL-XL) 25 MG 24 hr tablet Take 1 tablet by mouth once daily.   1/8/2025    " pantoprazole (PROTONIX) 40 MG tablet Take 40 mg by mouth 2 (two) times a day.   1/8/2025    PLAVIX 75 mg tablet Take 1 tablet by mouth once daily.   1/8/2025    potassium chloride SA (K-DUR,KLOR-CON) 20 MEQ tablet Take 1 tablet (20 mEq total) by mouth every other day. 30 tablet 1 Past Week    ondansetron (ZOFRAN) 4 MG tablet Take 1 tablet (4 mg total) by mouth every 6 (six) hours. 12 tablet 0 Unknown                           .

## 2025-01-09 NOTE — ASSESSMENT & PLAN NOTE
Atypical presentation for NSTEMI.  No typical CP/angina.  Seems to be secondary NSTEMI perhaps due to being sick recently maybe with cold/viral or other issues.  ECG on admit with inferior/anterior infarct w Q waves noted.    Pt is frail/weak; likely best treated with continued medical tx.    IV heparin gtt x 48 hours as tolerated.  DAPT.  Statin tx.  Echocardiogram.

## 2025-01-09 NOTE — ASSESSMENT & PLAN NOTE
Chronic, controlled.  Latest blood pressure and vitals reviewed-   Temp:  [97.9 °F (36.6 °C)]   Pulse:  [77-88]   Resp:  [20]   BP: ()/(60-69)   SpO2:  [97 %-100 %] .   Home meds for hypertension were reviewed and noted below.   Hypertension Medications               furosemide (LASIX) 20 MG tablet Take 1 tablet (20 mg total) by mouth 2 (two) times a day.    losartan (COZAAR) 25 MG tablet Take 1 tablet (25 mg total) by mouth once daily.    metoprolol succinate (TOPROL-XL) 25 MG 24 hr tablet Take 0.5 tablets (12.5 mg total) by mouth every evening.            While in the hospital, will manage blood pressure as follows; Continue home antihypertensive regimen    Will utilize p.r.n. blood pressure medication only if patient's blood pressure greater than  160/90 and she develops symptoms such as worsening chest pain or shortness of breath.

## 2025-01-09 NOTE — ASSESSMENT & PLAN NOTE
Patient's COPD is with exacerbation noted by continued dyspnea and worsening of baseline hypoxia currently likely secondary to CHF exacerbation.  Patient is currently off COPD Pathway. Continue scheduled inhalers  duonebs and Supplemental oxygen and monitor respiratory status closely.

## 2025-01-09 NOTE — FIRST PROVIDER EVALUATION
Medical screening examination initiated.  I have conducted a focused provider triage encounter, findings are as follows:    Brief history of present illness:  74-year-old female presents to emergency department with the family for a chief complaint of tachycardia, nausea, vomiting, and epistaxis.  Daughter reports that she has been sick with nasal congestion, cough, nasal discharge with a past several days.  Reports blowing her nose tonight and having a nosebleed.  Reports a nosebleed has resolved at this time.  Additionally reports bilateral lower extremity edema that has worsened over the past few days.  History of CHF and COPD.    There were no vitals filed for this visit.    Pertinent physical exam:  Leg swelling, tachycardia, nasal congestion, rhinorrhea    Brief workup plan:  Workup, nasal swabs    Preliminary workup initiated; this workup will be continued and followed by the physician or advanced practice provider that is assigned to the patient when roomed.

## 2025-01-09 NOTE — HPI
Cardiology consulted for CHF, elevated troponin.  Pt has hx of severe CHF, multivessel CAD, severe MR, CVA, PE, COPD (home O2 dependent).  She was admitted Jan 2023 w NSTEMI, CHF and had LHC (Dr. Brown) and echo done at that time.  Cath reviewed: diffuse CAD noted with no good target for PCI (had 99% distal LCX, 99% LPDA, 70% mid LCX, small diffusely diseased RCA and LAD vessels.  EF 20% on cath w severe MR noted.  Echo Jan 2023 EF 25%, mild-mod AS, mod MR/TR, PAP 61 mmHg.  Pt now admitted with congestion, runny nose, weakness, nausea/vomiting, edema, KO, diarrhea, had nose bleed as well.  Ecg on admit NSR, anterior/inferior infarct noted with Q waves in anterior/inferior leads w ST elevation, LVH.  Dr Brown, on call Cards, deemed old MI.  Med tx advised.  Troponin shaye to 13.  BNP > 4900  She denies angina/CP sxs at time of consult.  She is weak, frail condition.  She seems to have possible dementia -- does not know year, and seems very forgetful.    Also it is made of note, that pt has not been seen by [facility] since 2023 and has had numerous CHF clinic appts that pt was no show.

## 2025-01-09 NOTE — SUBJECTIVE & OBJECTIVE
Past Medical History:   Diagnosis Date    CHF (congestive heart failure)     COPD (chronic obstructive pulmonary disease)     GERD (gastroesophageal reflux disease)     HLD (hyperlipidemia)     Hypertension     Other pulmonary embolism without acute cor pulmonale     approx greater than 4 years    Stroke     TIA       Past Surgical History:   Procedure Laterality Date    LEFT HEART CATHETERIZATION Left 1/22/2023    Procedure: CATHETERIZATION, HEART, LEFT;  Surgeon: Elias Brown MD;  Location: [facility] CATH LAB;  Service: Cardiology;  Laterality: Left;    TONSILLECTOMY         Review of patient's allergies indicates:   Allergen Reactions    Penicillins Hives    Bactrim [sulfamethoxazole-trimethoprim] Hives    Batroxobin Hives    Nitrofurantoin monohyd/m-cryst Itching    Codeine Rash       No current facility-administered medications on file prior to encounter.     Current Outpatient Medications on File Prior to Encounter   Medication Sig    PLAVIX 75 mg tablet Take 1 tablet by mouth once daily.    albuterol (PROVENTIL/VENTOLIN HFA) 90 mcg/actuation inhaler Inhale into the lungs daily as needed.    aspirin (ECOTRIN) 81 MG EC tablet Take 1 tablet (81 mg total) by mouth once daily.    atorvastatin (LIPITOR) 20 MG tablet Take 20 mg by mouth once daily.    docusate sodium (COLACE) 100 MG capsule Take 1 capsule (100 mg total) by mouth 2 (two) times daily as needed for Constipation.    EScitalopram oxalate (LEXAPRO) 10 MG tablet Take 10 mg by mouth once daily.    fluticasone-umeclidin-vilanter (TRELEGY ELLIPTA) 100-62.5-25 mcg DsDv Inhale 1 puff into the lungs.    furosemide (LASIX) 20 MG tablet Take 1 tablet (20 mg total) by mouth 2 (two) times a day.    HYDROcodone-acetaminophen (NORCO) 7.5-325 mg per tablet Take 1 tablet by mouth every 6 (six) hours as needed for Pain.    losartan (COZAAR) 25 MG tablet Take 1 tablet (25 mg total) by mouth once daily.    metoprolol succinate (TOPROL-XL) 25 MG 24 hr tablet Take 0.5  tablets (12.5 mg total) by mouth every evening.    ondansetron (ZOFRAN) 4 MG tablet Take 1 tablet (4 mg total) by mouth every 6 (six) hours.    pantoprazole (PROTONIX) 40 MG tablet Take 40 mg by mouth 2 (two) times a day.    potassium chloride SA (K-DUR,KLOR-CON) 20 MEQ tablet Take 1 tablet (20 mEq total) by mouth every other day.     Family History    None       Tobacco Use    Smoking status: Former     Current packs/day: 0.00     Average packs/day: 3.0 packs/day for 35.0 years (105.0 ttl pk-yrs)     Types: Cigarettes     Start date:      Quit date:      Years since quittin.0    Smokeless tobacco: Not on file   Substance and Sexual Activity    Alcohol use: Never    Drug use: No    Sexual activity: Never     Review of Systems   Constitutional:  Positive for activity change, appetite change and fatigue. Negative for chills, diaphoresis and fever.   HENT:  Positive for congestion and rhinorrhea.    Respiratory:  Positive for shortness of breath. Negative for cough.    Cardiovascular:  Positive for leg swelling. Negative for chest pain and palpitations.   Gastrointestinal:  Positive for diarrhea, nausea and vomiting.   Genitourinary:  Negative for dysuria, flank pain, frequency and hematuria.   All other systems reviewed and are negative.    Objective:     Vital Signs (Most Recent):  Temp: 97.9 °F (36.6 °C) (25)  Pulse: 77 (258)  Resp: 20 (25)  BP: 98/62 (25)  SpO2: 99 % (25) Vital Signs (24h Range):  Temp:  [97.9 °F (36.6 °C)] 97.9 °F (36.6 °C)  Pulse:  [77-88] 77  Resp:  [20] 20  SpO2:  [97 %-100 %] 99 %  BP: ()/(60-69) 98/62     Weight: 61.6 kg (135 lb 11.2 oz)  Body mass index is 21.9 kg/m².     Physical Exam  Vitals and nursing note reviewed.   Constitutional:       General: She is awake. She is not in acute distress.     Appearance: Normal appearance. She is well-developed and well-groomed. She is ill-appearing. She is not toxic-appearing or  diaphoretic.   HENT:      Head: Normocephalic and atraumatic.      Mouth/Throat:      Lips: Pink.      Mouth: Mucous membranes are moist.      Pharynx: Oropharynx is clear. Uvula midline.   Eyes:      Extraocular Movements: Extraocular movements intact.      Conjunctiva/sclera: Conjunctivae normal.      Pupils: Pupils are equal, round, and reactive to light.   Cardiovascular:      Rate and Rhythm: Normal rate and regular rhythm.      Heart sounds: Normal heart sounds. No murmur heard.  Pulmonary:      Effort: Pulmonary effort is normal.      Breath sounds: Decreased breath sounds present. No wheezing, rhonchi or rales.   Abdominal:      General: Bowel sounds are normal.      Palpations: Abdomen is soft.      Tenderness: There is no abdominal tenderness.   Musculoskeletal:      Cervical back: Normal range of motion and neck supple.      Right lower leg: Edema present.      Left lower leg: Edema present.      Comments: 5/5 strength throughout   Skin:     General: Skin is warm and dry.      Capillary Refill: Capillary refill takes less than 2 seconds.   Neurological:      Mental Status: She is alert and oriented to person, place, and time. Mental status is at baseline.      GCS: GCS eye subscore is 4. GCS verbal subscore is 5. GCS motor subscore is 6.      Cranial Nerves: Cranial nerves 2-12 are intact.   Psychiatric:         Behavior: Behavior normal. Behavior is cooperative.              CRANIAL NERVES     CN III, IV, VI   Pupils are equal, round, and reactive to light.     LABS:  Recent Results (from the past 24 hours)   COVID-19 Rapid Screening    Collection Time: 01/08/25 10:22 PM   Result Value Ref Range    SARS-CoV-2 RNA, Amplification, Qual Negative Negative   Influenza A & B by Molecular    Collection Time: 01/08/25 10:22 PM    Specimen: Nasopharyngeal Swab   Result Value Ref Range    Influenza A, Molecular Negative Negative    Influenza B, Molecular Negative Negative    Flu A & B Source Nasal swab    CBC auto  differential    Collection Time: 01/09/25 12:52 AM   Result Value Ref Range    WBC 5.75 3.90 - 12.70 K/uL    RBC 4.07 4.00 - 5.40 M/uL    Hemoglobin 11.9 (L) 12.0 - 16.0 g/dL    Hematocrit 39.2 37.0 - 48.5 %    MCV 96 82 - 98 fL    MCH 29.2 27.0 - 31.0 pg    MCHC 30.4 (L) 32.0 - 36.0 g/dL    RDW 15.4 (H) 11.5 - 14.5 %    Platelets 163 150 - 450 K/uL    MPV 10.9 9.2 - 12.9 fL    Immature Granulocytes 0.5 0.0 - 0.5 %    Gran # (ANC) 4.5 1.8 - 7.7 K/uL    Immature Grans (Abs) 0.03 0.00 - 0.04 K/uL    Lymph # 0.7 (L) 1.0 - 4.8 K/uL    Mono # 0.5 0.3 - 1.0 K/uL    Eos # 0.0 0.0 - 0.5 K/uL    Baso # 0.02 0.00 - 0.20 K/uL    nRBC 0 0 /100 WBC    Gran % 78.7 (H) 38.0 - 73.0 %    Lymph % 12.0 (L) 18.0 - 48.0 %    Mono % 8.0 4.0 - 15.0 %    Eosinophil % 0.5 0.0 - 8.0 %    Basophil % 0.3 0.0 - 1.9 %    Differential Method Automated    Comprehensive metabolic panel    Collection Time: 01/09/25 12:52 AM   Result Value Ref Range    Sodium 142 136 - 145 mmol/L    Potassium 4.5 3.5 - 5.1 mmol/L    Chloride 103 95 - 110 mmol/L    CO2 26 23 - 29 mmol/L    Glucose 153 (H) 70 - 110 mg/dL    BUN 19 8 - 23 mg/dL    Creatinine 1.3 0.5 - 1.4 mg/dL    Calcium 9.5 8.7 - 10.5 mg/dL    Total Protein 7.0 6.0 - 8.4 g/dL    Albumin 3.5 3.5 - 5.2 g/dL    Total Bilirubin 1.2 (H) 0.1 - 1.0 mg/dL    Alkaline Phosphatase 58 40 - 150 U/L    AST 47 (H) 10 - 40 U/L    ALT 13 10 - 44 U/L    eGFR 43 (A) >60 mL/min/1.73 m^2    Anion Gap 13 8 - 16 mmol/L   Brain natriuretic peptide    Collection Time: 01/09/25 12:52 AM   Result Value Ref Range    BNP >4,900 (H) 0 - 99 pg/mL   Troponin I    Collection Time: 01/09/25 12:52 AM   Result Value Ref Range    Troponin I 3.045 (H) 0.000 - 0.026 ng/mL   Troponin I    Collection Time: 01/09/25  3:00 AM   Result Value Ref Range    Troponin I 6.258 (H) 0.000 - 0.026 ng/mL   Troponin I    Collection Time: 01/09/25  3:00 AM   Result Value Ref Range    Troponin I 6.258 (H) 0.000 - 0.026 ng/mL   Magnesium    Collection Time:  01/09/25  3:00 AM   Result Value Ref Range    Magnesium 2.4 1.6 - 2.6 mg/dL   TSH    Collection Time: 01/09/25  3:00 AM   Result Value Ref Range    TSH 5.532 (H) 0.400 - 4.000 uIU/mL   T4, free    Collection Time: 01/09/25  3:00 AM   Result Value Ref Range    Free T4 0.96 0.71 - 1.51 ng/dL       RADIOLOGY  X-Ray Chest AP Portable    Result Date: 1/8/2025  EXAMINATION: XR CHEST AP PORTABLE CLINICAL HISTORY: Localized edema TECHNIQUE: Single frontal view of the chest was performed. COMPARISON: None FINDINGS: Cardiomegaly with perihilar interstitial opacities.  Elevated left hemidiaphragm.  Similar findings to prior exam.  Correlate clinically to stable CHF. Bones are intact.     As above Electronically signed by: Ganesh Leung Date:    01/08/2025 Time:    22:59      EKG    MICROBIOLOGY    MDM     Amount and/or Complexity of Data Reviewed  Clinical lab tests: reviewed  Tests in the radiology section of CPT®: reviewed  Tests in the medicine section of CPT®: reviewed  Discussion of test results with the performing providers: yes  Decide to obtain previous medical records or to obtain history from someone other than the patient: yes  Obtain history from someone other than the patient: yes  Review and summarize past medical records: yes  Discuss the patient with other providers: yes  Independent visualization of images, tracings, or specimens: yes

## 2025-01-09 NOTE — PLAN OF CARE
[facility] - Emergency Dept.  Initial Discharge Assessment       Primary Care Provider: Keara Lee FNP    Admission Diagnosis: Acute exacerbation of CHF (congestive heart failure) [I50.9]    Admission Date: 1/9/2025  Expected Discharge Date:     Transition of Care Barriers: (P) None    Payor: HU[employer] MANAGED MEDICARE / Plan: HU[employer]NA MEDICARE PPO / Product Type: Medicare Advantage /     Extended Emergency Contact Information  Primary Emergency Contact: MargaritaSherley  Address: [address] [place] [address] [place]  Mobile Phone: [phone]  Relation: Daughter    Discharge Plan A: (P) Home         [facility] - [place], [place] - [address]  [address]  [facility] [address]  Phone: [phone] Fax: [phone]    [facility] - [place] - [address]  [address]  [facility] [address]  Phone: [phone] Fax: [phone]    [id number] - [place] - [address]  [address]  [place] [address]  Phone: [phone] Fax: [phone]      Initial Assessment (most recent)       Adult Discharge Assessment - 01/09/25 1254          Discharge Assessment    Assessment Type Discharge Planning Assessment     Confirmed/corrected address, phone number and insurance Yes     Confirmed Demographics Correct on Facesheet     Source of Information patient     Does patient/caregiver understand observation status Yes (P)      Communicated SHIVANI with patient/caregiver No (P)      Reason For Admission Acute exacerbation of CHF (congestive heart failure) (P)      People in Home child(kelly), adult (P)      Facility Arrived From: Home (P)      Do you expect to return to your current living situation? Yes (P)      Do you have help at home or someone to help you manage your care at home? Yes (P)      Who are your caregiver(s) and their phone number(s)? Sherley Neumann-  daughter [phone] (P)      Prior to hospitilization cognitive status: Alert/Oriented (P)      Current cognitive status: Alert/Oriented (P)      Walking or Climbing Stairs Difficulty yes (P)      Walking or Climbing Stairs ambulation difficulty, requires equipment (P)      Dressing/Bathing Difficulty no (P)      Home Accessibility wheelchair accessible (P)      Home Layout Able to live on 1st floor (P)      Equipment Currently Used at Home oxygen;walker, rolling;cane, straight (P)      Readmission within 30 days? No (P)      Patient currently being followed by outpatient case management? No (P)      Do you currently have service(s) that help you manage your care at home? No (P)      Do you take prescription medications? Yes (P)      Do you have prescription coverage? Yes (P)      Coverage HU[employer] MANAGED MEDICARE - [employer] MEDICARE PPO (P)      Do you have any problems affording any of your prescribed medications? No (P)      Is the patient taking medications as prescribed? yes (P)      Who is going to help you get home at discharge? Sherley Neumann- daughter [phone] (P)      How do you get to doctors appointments? family or friend will provide (P)      Are you on dialysis? No (P)      Do you take coumadin? No (P)      Discharge Plan A Home (P)      DME Needed Upon Discharge  none (P)      Discharge Plan discussed with: Patient (P)      Transition of Care Barriers None (P)         Physical Activity    On average, how many days per week do you engage in moderate to strenuous exercise (like a brisk walk)? 0 days (P)      On average, how many minutes do you engage in exercise at this level? 0 min (P)         Financial Resource Strain    How hard is it for you to pay for the very basics like food, housing, medical care, and heating? Not hard at all (P)         Housing Stability    In the last 12 months, was there a time when you were not able to pay the mortgage or rent on time? No (P)      At any time in the past  12 months, were you homeless or living in a shelter (including now)? No (P)         Transportation Needs    Has the lack of transportation kept you from medical appointments, meetings, work or from getting things needed for daily living? No (P)         Food Insecurity    Within the past 12 months, you worried that your food would run out before you got the money to buy more. Never true (P)      Within the past 12 months, the food you bought just didn't last and you didn't have money to get more. Never true (P)         Stress    Do you feel stress - tense, restless, nervous, or anxious, or unable to sleep at night because your mind is troubled all the time - these days? Not at all (P)         Social Isolation    How often do you feel lonely or isolated from those around you?  Never (P)         Alcohol Use    Q1: How often do you have a drink containing alcohol? Never (P)      Q2: How many drinks containing alcohol do you have on a typical day when you are drinking? Patient does not drink (P)      Q3: How often do you have six or more drinks on one occasion? Never (P)         elarm    In the past 12 months has the electric, gas, oil, or water company threatened to shut off services in your home? No (P)         Health Literacy    How often do you need to have someone help you when you read instructions, pamphlets, or other written material from your doctor or pharmacy? Never (P)         OTHER    Name(s) of People in Home Sherley Neumann- daughter [phone] (P)                       Sw spoke with patient at bedside to complete initial assessment. Patient currently lives with her daughter Sherley Neumann. Patient uses a rolling walker, oxygen, and cane for medical equipment at home. Patient is not receiving HH services. Patient is not receiving any services through the Coumadin clinic. Patient is not receiving any dialayis services. Patient's is current with Dr. Sagrario Lee as her PCP. Patient stated he uses the  [facility] in [place] to revieve her medications. When asked about the SDOH patient denies having any current barriers. No needs at this time.

## 2025-01-09 NOTE — CONSULTS
[facility] Emergency Dept.  Cardiology  Consult Note    Patient Name: Violeta Scherer  MRN: [id number]  Admission Date: 1/9/2025  Hospital Length of Stay: 0 days  Code Status: Full Code   Attending Provider: Calvin Schrader MD   Consulting Provider: Brian Young MD  Primary Care Physician: Keara Lee FNP  Principal Problem:NSTEMI (non-ST elevated myocardial infarction)    Patient information was obtained from patient, past medical records, ER records, and primary team.     Inpatient consult to Cardiology  Consult performed by: Brian Young MD  Consult ordered by: Matt Arnold NP  Reason for consult: NSTEMI, CHF        Subjective:         HPI:   Cardiology consulted for CHF, elevated troponin.  Pt has hx of severe CHF, multivessel CAD, severe MR, CVA, PE, COPD (home O2 dependent).  She was admitted Jan 2023 w NSTEMI, CHF and had LHC (Dr. Brown) and echo done at that time.  Cath reviewed: diffuse CAD noted with no good target for PCI (had 99% distal LCX, 99% LPDA, 70% mid LCX, small diffusely diseased RCA and LAD vessels.  EF 20% on cath w severe MR noted.  Echo Jan 2023 EF 25%, mild-mod AS, mod MR/TR, PAP 61 mmHg.  Pt now admitted with congestion, runny nose, weakness, nausea/vomiting, edema, KO, diarrhea, had nose bleed as well.  Ecg on admit NSR, anterior/inferior infarct noted with Q waves in anterior/inferior leads w ST elevation, LVH.  Dr Brown, on call Cards, deemed old MI.  Med tx advised.  Troponin shaye to 13.  BNP > 4900  She denies angina/CP sxs at time of consult.  She is weak, frail condition.  She seems to have possible dementia -- does not know year, and seems very forgetful.    Also it is made of note, that pt has not been seen by [facility] since 2023 and has had numerous CHF clinic appts that pt was no show.                  Past Medical History:   Diagnosis Date    CHF (congestive heart failure)     COPD (chronic obstructive pulmonary disease)     GERD (gastroesophageal reflux  disease)     HLD (hyperlipidemia)     Hypertension     Other pulmonary embolism without acute cor pulmonale     approx greater than 4 years    Stroke     TIA       Past Surgical History:   Procedure Laterality Date    LEFT HEART CATHETERIZATION Left 1/22/2023    Procedure: CATHETERIZATION, HEART, LEFT;  Surgeon: Elias Brown MD;  Location: [facility] CATH LAB;  Service: Cardiology;  Laterality: Left;    TONSILLECTOMY         Review of patient's allergies indicates:   Allergen Reactions    Penicillins Hives    Bactrim [sulfamethoxazole-trimethoprim] Hives    Batroxobin Hives    Nitrofurantoin monohyd/m-cryst Itching    Codeine Rash       No current facility-administered medications on file prior to encounter.     Current Outpatient Medications on File Prior to Encounter   Medication Sig    PLAVIX 75 mg tablet Take 1 tablet by mouth once daily.    albuterol (PROVENTIL/VENTOLIN HFA) 90 mcg/actuation inhaler Inhale into the lungs daily as needed.    aspirin (ECOTRIN) 81 MG EC tablet Take 1 tablet (81 mg total) by mouth once daily.    atorvastatin (LIPITOR) 20 MG tablet Take 20 mg by mouth once daily.    docusate sodium (COLACE) 100 MG capsule Take 1 capsule (100 mg total) by mouth 2 (two) times daily as needed for Constipation.    EScitalopram oxalate (LEXAPRO) 10 MG tablet Take 10 mg by mouth once daily.    fluticasone-umeclidin-vilanter (TRELEGY ELLIPTA) 100-62.5-25 mcg DsDv Inhale 1 puff into the lungs.    furosemide (LASIX) 20 MG tablet Take 1 tablet (20 mg total) by mouth 2 (two) times a day.    HYDROcodone-acetaminophen (NORCO) 7.5-325 mg per tablet Take 1 tablet by mouth every 6 (six) hours as needed for Pain.    losartan (COZAAR) 25 MG tablet Take 1 tablet (25 mg total) by mouth once daily.    metoprolol succinate (TOPROL-XL) 25 MG 24 hr tablet Take 0.5 tablets (12.5 mg total) by mouth every evening.    ondansetron (ZOFRAN) 4 MG tablet Take 1 tablet (4 mg total) by mouth every 6 (six) hours.    pantoprazole  (PROTONIX) 40 MG tablet Take 40 mg by mouth 2 (two) times a day.    potassium chloride SA (K-DUR,KLOR-CON) 20 MEQ tablet Take 1 tablet (20 mEq total) by mouth every other day.     Family History    None       Tobacco Use    Smoking status: Former     Current packs/day: 0.00     Average packs/day: 3.0 packs/day for 35.0 years (105.0 ttl pk-yrs)     Types: Cigarettes     Start date:      Quit date:      Years since quittin.0    Smokeless tobacco: Not on file   Substance and Sexual Activity    Alcohol use: Never    Drug use: No    Sexual activity: Never     Review of Systems   Constitutional: Positive for malaise/fatigue.   HENT:  Positive for congestion and nosebleeds.    Eyes: Negative.    Cardiovascular:  Positive for dyspnea on exertion and leg swelling.   Respiratory:  Positive for cough and shortness of breath.    Endocrine: Negative.    Hematologic/Lymphatic: Negative.    Skin: Negative.    Musculoskeletal:  Positive for arthritis, muscle weakness and stiffness.   Gastrointestinal:  Positive for change in bowel habit, diarrhea, nausea and vomiting.   Genitourinary: Negative.    Neurological:  Positive for difficulty with concentration and weakness.   Psychiatric/Behavioral:  Positive for memory loss.    Allergic/Immunologic: Negative.      Objective:     Vital Signs (Most Recent):  Temp: 97.9 °F (36.6 °C) (25 2219)  Pulse: 78 (25 0810)  Resp: 20 (25 0418)  BP: 108/60 (25 0810)  SpO2: 100 % (25 0810) Vital Signs (24h Range):  Temp:  [97.9 °F (36.6 °C)] 97.9 °F (36.6 °C)  Pulse:  [77-88] 78  Resp:  [20] 20  SpO2:  [97 %-100 %] 100 %  BP: ()/(60-69) 108/60     Weight: 61.2 kg (135 lb)  Body mass index is 22.47 kg/m².    SpO2: 100 %       No intake or output data in the 24 hours ending 25 1306    Lines/Drains/Airways       Peripheral Intravenous Line  Duration                  Peripheral IV - Single Lumen 25 0049 20 G Anterior;Proximal;Right Forearm <1 day  "                    Physical Exam  Vitals and nursing note reviewed.   Constitutional:       General: She is not in acute distress.     Appearance: Normal appearance. She is well-developed and underweight. She is ill-appearing. She is not diaphoretic.   HENT:      Head: Normocephalic.   Neck:      Vascular: No carotid bruit or JVD.   Cardiovascular:      Rate and Rhythm: Normal rate and regular rhythm.      Pulses: Normal pulses.           Radial pulses are 2+ on the right side and 2+ on the left side.      Heart sounds: S1 normal and S2 normal. Murmur heard.      Systolic murmur is present.      No friction rub. No gallop.   Pulmonary:      Effort: Pulmonary effort is normal.      Breath sounds: Examination of the right-lower field reveals decreased breath sounds. Examination of the left-lower field reveals decreased breath sounds. Decreased breath sounds present. No wheezing or rales.   Abdominal:      General: Bowel sounds are normal. There is no abdominal bruit.      Palpations: Abdomen is soft.      Tenderness: There is no abdominal tenderness.   Musculoskeletal:      Cervical back: Neck supple.      Right lower leg: Edema present.      Left lower leg: Edema present.   Lymphadenopathy:      Cervical: No cervical adenopathy.   Skin:     General: Skin is warm.   Psychiatric:         Behavior: Behavior is cooperative.          Significant Labs: ABG: No results for input(s): "PH", "PCO2", "HCO3", "POCSATURATED", "BE" in the last 48 hours., Blood Culture: No results for input(s): "LABBLOO" in the last 48 hours., BMP:   Recent Labs   Lab 01/09/25  0052 01/09/25  0300   *  --      --    K 4.5  --      --    CO2 26  --    BUN 19  --    CREATININE 1.3  --    CALCIUM 9.5  --    MG  --  2.4   , CMP   Recent Labs   Lab 01/09/25  0052      K 4.5      CO2 26   *   BUN 19   CREATININE 1.3   CALCIUM 9.5   PROT 7.0   ALBUMIN 3.5   BILITOT 1.2*   ALKPHOS 58   AST 47*   ALT 13   ANIONGAP 13 " "  , CBC   Recent Labs   Lab 01/09/25  0052   WBC 5.75   HGB 11.9*   HCT 39.2      , INR No results for input(s): "INR", "PROTIME" in the last 48 hours., Lipid Panel No results for input(s): "CHOL", "HDL", "LDLCALC", "TRIG", "CHOLHDL" in the last 48 hours., and Troponin No results for input(s): "TROPONINIHS" in the last 48 hours.    Significant Imaging: Echocardiogram: Transthoracic echo (TTE) complete (Cupid Only):   Results for orders placed or performed during the hospital encounter of 01/09/25   Echo   Result Value Ref Range    BSA 1.68 m2    LA WIDTH 5.1 cm    RA Width 5.1 cm    LVOT stroke volume 26.9 cm3    LVIDd 5.3 3.5 - 6.0 cm    LV Systolic Volume 112.85 mL    LV Systolic Volume Index 67.6 mL/m2    LVIDs 4.9 (A) 2.1 - 4.0 cm    LV Diastolic Volume 134.31 mL    LV Diastolic Volume Index 80.43 mL/m2    Left Ventricular End Systolic Volume by Teichholz Method 112.85 mL    Left Ventricular End Diastolic Volume by Teichholz Method 134.31 mL    IVS 0.9 0.6 - 1.1 cm    LVOT diameter 1.9 cm    LVOT area 2.8 cm2    FS 7.5 (A) 28 - 44 %    Left Ventricle Relative Wall Thickness 0.30 cm    PW 0.8 0.6 - 1.1 cm    LV mass 162.1 g    LV Mass Index 97.1 g/m2    MV Peak E Sarmad 1.27 m/s    TDI LATERAL 0.06 m/s    TDI SEPTAL 0.04 m/s    E/E' ratio 25.40 m/s    MV Peak A Sarmad 0.98 m/s    TR Max Sarmad 3.83 m/s    E/A ratio 1.30     IVRT 79.92 msec    E wave deceleration time 162.82 msec    LV SEPTAL E/E' RATIO 31.75 m/s    ALISHA 54.0 mL/m2    LV LATERAL E/E' RATIO 21.17 m/s    LA Vol 90.11 cm3    PV Peak S Sarmad 0.65 m/s    PV Peak D Sarmad 0.29 m/s    Pulm vein S/D ratio 2.24     LVOT peak sarmad 0.5 m/s    Left Ventricular Outflow Tract Mean Velocity 0.40 cm/s    Left Ventricular Outflow Tract Mean Gradient 0.68 mmHg    RVOT peak VTI 8.6 cm    TAPSE 2.16 cm    LA size 3.95 cm    Left Atrium Minor Axis 5.07 cm    Left Atrium Major Axis 5.47 cm    RA Major Axis 5.46 cm    AV mean gradient 9.0 mmHg    AV peak gradient 11.6 mmHg    " Ao peak aleksandr 1.7 m/s    Ao VTI 35.7 cm    LVOT peak VTI 9.5 cm    AV valve area 0.8 cm²    AV Velocity Ratio 0.29     AV index (prosthetic) 0.27     KRISTIE by Velocity Ratio 0.8 cm²    Mr max aleksandr 4.58 m/s    Triscuspid Valve Regurgitation Peak Gradient 59 mmHg    PV mean gradient 1 mmHg    PV peak gradient 1     RVOT peak aleksandr 0.45 m/s    Ao root annulus 3.12 cm    STJ 3.00 cm    Ascending aorta 3.09 cm    IVC diameter 1.84 cm    Mean e' 0.05 m/s    ZLVIDS 4.17     ZLVIDD 1.27      Assessment and Plan:     * NSTEMI (non-ST elevated myocardial infarction)  Atypical presentation for NSTEMI.  No typical CP/angina.  Seems to be secondary NSTEMI perhaps due to being sick recently maybe with cold/viral or other issues.  ECG on admit with inferior/anterior infarct w Q waves noted.    Pt is frail/weak; likely best treated with continued medical tx.    IV heparin gtt x 48 hours as tolerated.  DAPT.  Statin tx.  Echocardiogram.          Nonrheumatic mitral valve regurgitation  Severe MR on 2023 cath and likely ischemic MR.  Echo to reevaluate.    Pulmonary hypertension  Echo reevaluation.    Abnormal ECG  Inferior/anterior infarct noted.  See CAD/NSTEMI section.    CAD, multiple vessel  [facility] 2023 diffuse multivessel CAD; med mgt advised.    OMT advised for CAD.    Acute on chronic combined systolic and diastolic heart failure  IV Lasix gtt.  Low salt diet.  GDMT advised for severe CHF as tolerated.  Echocardiogram.          VTE Risk Mitigation (From admission, onward)           Ordered     heparin 25,000 units in dextrose 5% (100 units/ml) IV bolus from bag LOW INTENSITY nomogram - [facility]  As needed (PRN)        Question:  Heparin Infusion Adjustment (DO NOT MODIFY ANSWER)  Answer:  [email or web address]\epic\Images\Pharmacy\HeparinInfusions\heparin LOW INTENSITY nomogram for [facility] [id number]    01/09/25 1303     heparin 25,000 units in dextrose 5% (100 units/ml) IV bolus from bag LOW INTENSITY nomogram - [facility]  As needed (PRN)        Question:   Heparin Infusion Adjustment (DO NOT MODIFY ANSWER)  Answer:  [email or web address]\epic\Images\Pharmacy\HeparinInfusions\heparin LOW INTENSITY nomogram for [facility] [id number]    01/09/25 1303     heparin 25,000 units in dextrose 5% 250 mL (100 units/mL) infusion LOW INTENSITY nomogram - [facility]  Continuous        Question:  Begin at (units/kg/hr)  Answer:  12    01/09/25 1303     IP VTE HIGH RISK PATIENT  Once         01/09/25 0234     Place sequential compression device  Until discontinued         01/09/25 0234                    Thank you for your consult.     Brian Young MD  Cardiology   [facility] Emergency Dept.

## 2025-01-09 NOTE — H&P
[facility] - Emergency Dept.  [facility] Medicine  History & Physical    Patient Name: Violeta Scherer  MRN: [id number]  Patient Class: IP- Inpatient  Admission Date: 1/9/2025  Attending Physician: Alex Arnold MD  Primary Care Provider: Keara Lee FNP         Patient information was obtained from patient, relative(s), past medical records, and ER records.     Subjective:     Principal Problem:Acute exacerbation of CHF (congestive heart failure)    Chief Complaint:   Chief Complaint   Patient presents with    Epistaxis     Moderate nose bleeding when blowing onset ~20 mins PTA; runny nose, congestion; onset 1 week ago    Emesis     N/V/D onset 1 day ago; hx of CHF, COPD uses 5L nc at home        HPI: Violeta Scherer is a 74 y.o. female with a PMH  has a past medical history of Hypertension, Other pulmonary embolism without acute cor pulmonale, and Stroke.presented to the Emergency Department for evaluation of N/V/D, general weakness and bilateral leg edema which onset gradually 1 day ago. Pt's granddaughter noticed pt has been weak and requiring more assistance for the past day. She also reports rhinorrhea and congestion which onset about a week ago but worsened yesterday. Pt's granddaughter reports epistaxis onset after blowing nose about 20 minutes PTA. Symptoms are constant and moderate in severity. No mitigating or exacerbating factors reported. Associated sxs include leg swelling and SOB. Patient denies any chest pain, abdominal pain, myalgias, headache, fever, and all other sxs at this time. No prior Tx reported. Pt uses O2 between 2-4L/min via NC at home. No further complaints or concerns at this time.     ER workup revealed CBC to be unremarkable.  CMP revealed  mg/dL.  BNP>4,900.  Troponin 3.045.  Vitals stating.  325 mg aspirin given.  Chest x-ray cardiomegaly with perihilar interstitial opacities with CHF.  EKG was sinus rhythm with ventricular rate 81 beats per minute and a QT/QTC of 396/460.   Cardiology consulted.  We will see in a.m. as consult.  Recommend Hospital Medicine admit for NSTEMI.  Patient in agreement with treatment plan.  Patient admitted under inpatient status.        PCP: Keara Lee      Past Medical History:   Diagnosis Date    CHF (congestive heart failure)     COPD (chronic obstructive pulmonary disease)     GERD (gastroesophageal reflux disease)     HLD (hyperlipidemia)     Hypertension     Other pulmonary embolism without acute cor pulmonale     approx greater than 4 years    Stroke     TIA       Past Surgical History:   Procedure Laterality Date    LEFT HEART CATHETERIZATION Left 1/22/2023    Procedure: CATHETERIZATION, HEART, LEFT;  Surgeon: Elias Brown MD;  Location: [facility] CATH LAB;  Service: Cardiology;  Laterality: Left;    TONSILLECTOMY         Review of patient's allergies indicates:   Allergen Reactions    Penicillins Hives    Bactrim [sulfamethoxazole-trimethoprim] Hives    Batroxobin Hives    Nitrofurantoin monohyd/m-cryst Itching    Codeine Rash       No current facility-administered medications on file prior to encounter.     Current Outpatient Medications on File Prior to Encounter   Medication Sig    PLAVIX 75 mg tablet Take 1 tablet by mouth once daily.    albuterol (PROVENTIL/VENTOLIN HFA) 90 mcg/actuation inhaler Inhale into the lungs daily as needed.    aspirin (ECOTRIN) 81 MG EC tablet Take 1 tablet (81 mg total) by mouth once daily.    atorvastatin (LIPITOR) 20 MG tablet Take 20 mg by mouth once daily.    docusate sodium (COLACE) 100 MG capsule Take 1 capsule (100 mg total) by mouth 2 (two) times daily as needed for Constipation.    EScitalopram oxalate (LEXAPRO) 10 MG tablet Take 10 mg by mouth once daily.    fluticasone-umeclidin-vilanter (TRELEGY ELLIPTA) 100-62.5-25 mcg DsDv Inhale 1 puff into the lungs.    furosemide (LASIX) 20 MG tablet Take 1 tablet (20 mg total) by mouth 2 (two) times a day.    HYDROcodone-acetaminophen (NORCO) 7.5-325 mg per  tablet Take 1 tablet by mouth every 6 (six) hours as needed for Pain.    losartan (COZAAR) 25 MG tablet Take 1 tablet (25 mg total) by mouth once daily.    metoprolol succinate (TOPROL-XL) 25 MG 24 hr tablet Take 0.5 tablets (12.5 mg total) by mouth every evening.    ondansetron (ZOFRAN) 4 MG tablet Take 1 tablet (4 mg total) by mouth every 6 (six) hours.    pantoprazole (PROTONIX) 40 MG tablet Take 40 mg by mouth 2 (two) times a day.    potassium chloride SA (K-DUR,KLOR-CON) 20 MEQ tablet Take 1 tablet (20 mEq total) by mouth every other day.     Family History    None       Tobacco Use    Smoking status: Former     Current packs/day: 0.00     Average packs/day: 3.0 packs/day for 35.0 years (105.0 ttl pk-yrs)     Types: Cigarettes     Start date:      Quit date:      Years since quittin.0    Smokeless tobacco: Not on file   Substance and Sexual Activity    Alcohol use: Never    Drug use: No    Sexual activity: Never     Review of Systems   Constitutional:  Positive for activity change, appetite change and fatigue. Negative for chills, diaphoresis and fever.   HENT:  Positive for congestion and rhinorrhea.    Respiratory:  Positive for shortness of breath. Negative for cough.    Cardiovascular:  Positive for leg swelling. Negative for chest pain and palpitations.   Gastrointestinal:  Positive for diarrhea, nausea and vomiting.   Genitourinary:  Negative for dysuria, flank pain, frequency and hematuria.   All other systems reviewed and are negative.    Objective:     Vital Signs (Most Recent):  Temp: 97.9 °F (36.6 °C) (259)  Pulse: 77 (258)  Resp: 20 (25)  BP: 98/62 (25)  SpO2: 99 % (25) Vital Signs (24h Range):  Temp:  [97.9 °F (36.6 °C)] 97.9 °F (36.6 °C)  Pulse:  [77-88] 77  Resp:  [20] 20  SpO2:  [97 %-100 %] 99 %  BP: ()/(60-69) 98/62     Weight: 61.6 kg (135 lb 11.2 oz)  Body mass index is 21.9 kg/m².     Physical Exam  Vitals and nursing  note reviewed.   Constitutional:       General: She is awake. She is not in acute distress.     Appearance: Normal appearance. She is well-developed and well-groomed. She is ill-appearing. She is not toxic-appearing or diaphoretic.   HENT:      Head: Normocephalic and atraumatic.      Mouth/Throat:      Lips: Pink.      Mouth: Mucous membranes are moist.      Pharynx: Oropharynx is clear. Uvula midline.   Eyes:      Extraocular Movements: Extraocular movements intact.      Conjunctiva/sclera: Conjunctivae normal.      Pupils: Pupils are equal, round, and reactive to light.   Cardiovascular:      Rate and Rhythm: Normal rate and regular rhythm.      Heart sounds: Normal heart sounds. No murmur heard.  Pulmonary:      Effort: Pulmonary effort is normal.      Breath sounds: Decreased breath sounds present. No wheezing, rhonchi or rales.   Abdominal:      General: Bowel sounds are normal.      Palpations: Abdomen is soft.      Tenderness: There is no abdominal tenderness.   Musculoskeletal:      Cervical back: Normal range of motion and neck supple.      Right lower leg: Edema present.      Left lower leg: Edema present.      Comments: 5/5 strength throughout   Skin:     General: Skin is warm and dry.      Capillary Refill: Capillary refill takes less than 2 seconds.   Neurological:      Mental Status: She is alert and oriented to person, place, and time. Mental status is at baseline.      GCS: GCS eye subscore is 4. GCS verbal subscore is 5. GCS motor subscore is 6.      Cranial Nerves: Cranial nerves 2-12 are intact.   Psychiatric:         Behavior: Behavior normal. Behavior is cooperative.              CRANIAL NERVES     CN III, IV, VI   Pupils are equal, round, and reactive to light.     LABS:  Recent Results (from the past 24 hours)   COVID-19 Rapid Screening    Collection Time: 01/08/25 10:22 PM   Result Value Ref Range    SARS-CoV-2 RNA, Amplification, Qual Negative Negative   Influenza A & B by Molecular     Collection Time: 01/08/25 10:22 PM    Specimen: Nasopharyngeal Swab   Result Value Ref Range    Influenza A, Molecular Negative Negative    Influenza B, Molecular Negative Negative    Flu A & B Source Nasal swab    CBC auto differential    Collection Time: 01/09/25 12:52 AM   Result Value Ref Range    WBC 5.75 3.90 - 12.70 K/uL    RBC 4.07 4.00 - 5.40 M/uL    Hemoglobin 11.9 (L) 12.0 - 16.0 g/dL    Hematocrit 39.2 37.0 - 48.5 %    MCV 96 82 - 98 fL    MCH 29.2 27.0 - 31.0 pg    MCHC 30.4 (L) 32.0 - 36.0 g/dL    RDW 15.4 (H) 11.5 - 14.5 %    Platelets 163 150 - 450 K/uL    MPV 10.9 9.2 - 12.9 fL    Immature Granulocytes 0.5 0.0 - 0.5 %    Gran # (ANC) 4.5 1.8 - 7.7 K/uL    Immature Grans (Abs) 0.03 0.00 - 0.04 K/uL    Lymph # 0.7 (L) 1.0 - 4.8 K/uL    Mono # 0.5 0.3 - 1.0 K/uL    Eos # 0.0 0.0 - 0.5 K/uL    Baso # 0.02 0.00 - 0.20 K/uL    nRBC 0 0 /100 WBC    Gran % 78.7 (H) 38.0 - 73.0 %    Lymph % 12.0 (L) 18.0 - 48.0 %    Mono % 8.0 4.0 - 15.0 %    Eosinophil % 0.5 0.0 - 8.0 %    Basophil % 0.3 0.0 - 1.9 %    Differential Method Automated    Comprehensive metabolic panel    Collection Time: 01/09/25 12:52 AM   Result Value Ref Range    Sodium 142 136 - 145 mmol/L    Potassium 4.5 3.5 - 5.1 mmol/L    Chloride 103 95 - 110 mmol/L    CO2 26 23 - 29 mmol/L    Glucose 153 (H) 70 - 110 mg/dL    BUN 19 8 - 23 mg/dL    Creatinine 1.3 0.5 - 1.4 mg/dL    Calcium 9.5 8.7 - 10.5 mg/dL    Total Protein 7.0 6.0 - 8.4 g/dL    Albumin 3.5 3.5 - 5.2 g/dL    Total Bilirubin 1.2 (H) 0.1 - 1.0 mg/dL    Alkaline Phosphatase 58 40 - 150 U/L    AST 47 (H) 10 - 40 U/L    ALT 13 10 - 44 U/L    eGFR 43 (A) >60 mL/min/1.73 m^2    Anion Gap 13 8 - 16 mmol/L   Brain natriuretic peptide    Collection Time: 01/09/25 12:52 AM   Result Value Ref Range    BNP >4,900 (H) 0 - 99 pg/mL   Troponin I    Collection Time: 01/09/25 12:52 AM   Result Value Ref Range    Troponin I 3.045 (H) 0.000 - 0.026 ng/mL   Troponin I    Collection Time: 01/09/25  3:00  AM   Result Value Ref Range    Troponin I 6.258 (H) 0.000 - 0.026 ng/mL   Troponin I    Collection Time: 01/09/25  3:00 AM   Result Value Ref Range    Troponin I 6.258 (H) 0.000 - 0.026 ng/mL   Magnesium    Collection Time: 01/09/25  3:00 AM   Result Value Ref Range    Magnesium 2.4 1.6 - 2.6 mg/dL   TSH    Collection Time: 01/09/25  3:00 AM   Result Value Ref Range    TSH 5.532 (H) 0.400 - 4.000 uIU/mL   T4, free    Collection Time: 01/09/25  3:00 AM   Result Value Ref Range    Free T4 0.96 0.71 - 1.51 ng/dL       RADIOLOGY  X-Ray Chest AP Portable    Result Date: 1/8/2025  EXAMINATION: XR CHEST AP PORTABLE CLINICAL HISTORY: Localized edema TECHNIQUE: Single frontal view of the chest was performed. COMPARISON: None FINDINGS: Cardiomegaly with perihilar interstitial opacities.  Elevated left hemidiaphragm.  Similar findings to prior exam.  Correlate clinically to stable CHF. Bones are intact.     As above Electronically signed by: Ganesh Leung Date:    01/08/2025 Time:    22:59      EKG    MICROBIOLOGY    MDM     Amount and/or Complexity of Data Reviewed  Clinical lab tests: reviewed  Tests in the radiology section of CPT®: reviewed  Tests in the medicine section of CPT®: reviewed  Discussion of test results with the performing providers: yes  Decide to obtain previous medical records or to obtain history from someone other than the patient: yes  Obtain history from someone other than the patient: yes  Review and summarize past medical records: yes  Discuss the patient with other providers: yes  Independent visualization of images, tracings, or specimens: yes        Assessment/Plan:     * Acute exacerbation of CHF (congestive heart failure)  Patient has  unspecified  heart failure that is Acute on chronic. On presentation their CHF was decompensated. Evidence of decompensated CHF on presentation includes: edema, orthopnea, paroxysmal nocturnal dyspnea (PND), dyspnea on exertion (KO), and shortness of breath. The  etiology of their decompensation is likely dietary indiscretion. Most recent BNP and echo results are listed below.  Recent Labs     01/09/25  0052   BNP >4,900*     Latest ECHO  Results for orders placed during the hospital encounter of 01/19/23    Echo    Interpretation Summary  · The left ventricle is moderately enlarged with concentric hypertrophy and severely decreased systolic function.  · The estimated ejection fraction is 25%.  · Grade II left ventricular diastolic dysfunction.  · There is severe left ventricular global hypokinesis.  · Normal right ventricular size with normal right ventricular systolic function.  · There is mild-to-moderate aortic valve stenosis.  · Aortic valve area is 0.88 cm2; peak velocity is 2.49 m/s; mean gradient is 17 mmHg.  · Moderate mitral regurgitation.  · Moderate tricuspid regurgitation.  · Elevated central venous pressure (15 mmHg).  · The estimated PA systolic pressure is 61 mmHg.  · There is pulmonary hypertension.  · Trivial circumferential pericardial effusion. Effusion is fluid.    Current Heart Failure Medications  furosemide injection 40 mg, Every 12 hours, Intravenous    Plan  - Monitor strict I&Os and daily weights.    - Place on telemetry  - Low sodium diet  - Place on fluid restriction of 2 L.   - Cardiology has been consulted  - The patient's volume status is improving but not at their baseline as indicated by edema, orthopnea, paroxysmal nocturnal dyspnea (PND), dyspnea on exertion (KO), and shortness of breath      Hypertension  Chronic, controlled.  Latest blood pressure and vitals reviewed-   Temp:  [97.9 °F (36.6 °C)]   Pulse:  [77-88]   Resp:  [20]   BP: ()/(60-69)   SpO2:  [97 %-100 %] .   Home meds for hypertension were reviewed and noted below.   Hypertension Medications               furosemide (LASIX) 20 MG tablet Take 1 tablet (20 mg total) by mouth 2 (two) times a day.    losartan (COZAAR) 25 MG tablet Take 1 tablet (25 mg total) by mouth once  daily.    metoprolol succinate (TOPROL-XL) 25 MG 24 hr tablet Take 0.5 tablets (12.5 mg total) by mouth every evening.     While in the hospital, will manage blood pressure as follows; Continue home antihypertensive regimen    Will utilize p.r.n. blood pressure medication only if patient's blood pressure greater than  160/90 and she develops symptoms such as worsening chest pain or shortness of breath.      HLD (hyperlipidemia)  Patient is chronically on statin.will continue for now. Last Lipid Panel:   Lab Results   Component Value Date    CHOL 166 10/01/2024    HDL 42 (L) 10/01/2024    LDLCALC 112 10/01/2024    TRIG 59 10/01/2024    CHOLHDL 34.1 01/20/2023     Plan:  -Continue home medication  -low fat/low calorie diet      GERD (gastroesophageal reflux disease)  Chronic. Stable. Currently asymptomatic. Home medications include PPI/Antacids as needed.  Plan:  -Continue PPI/Antacids as needed       Carotid stenosis, bilateral  Compliant with Plavix.  Denies any new or worsening symptoms.    Plan:   -continue Plavix      History of COPD  Patient's COPD is with exacerbation noted by continued dyspnea and worsening of baseline hypoxia currently likely secondary to CHF exacerbation.  Patient is currently off COPD Pathway. Continue scheduled inhalers  duonebs and Supplemental oxygen and monitor respiratory status closely.           VTE Risk Mitigation (From admission, onward)           Ordered     enoxaparin injection 40 mg  Daily         01/09/25 0234     IP VTE HIGH RISK PATIENT  Once         01/09/25 0234     Place sequential compression device  Until discontinued         01/09/25 0234                  //Core Measures   -DVT proph: SCDs, lovenox  -Code status Full    -Surrogate:daughter      Components of this note were documented using a voice recognition system and are subject to errors not corrected at the time the document was proof read. Please contact the author for any clarifications.       Matt Arnold,  NP  Department of Hospital Medicine  [facility] - Emergency Dept.

## 2025-01-09 NOTE — ASSESSMENT & PLAN NOTE
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    OMT advised for CAD.

## 2025-01-10 ENCOUNTER — DOCUMENTATION ONLY (OUTPATIENT)
Dept: CARDIOLOGY | Facility: CLINIC | Age: 75
End: 2025-01-10
Payer: MEDICARE

## 2025-01-10 PROBLEM — N17.9 AKI (ACUTE KIDNEY INJURY): Status: ACTIVE | Noted: 2025-01-10

## 2025-01-10 LAB
ANION GAP SERPL CALC-SCNC: 14 MMOL/L (ref 8–16)
ANION GAP SERPL CALC-SCNC: 16 MMOL/L (ref 8–16)
APTT PPP: 66.7 SEC (ref 21–32)
BASOPHILS # BLD AUTO: 0.01 K/UL (ref 0–0.2)
BASOPHILS NFR BLD: 0.2 % (ref 0–1.9)
BUN SERPL-MCNC: 28 MG/DL (ref 8–23)
BUN SERPL-MCNC: 30 MG/DL (ref 8–23)
CALCIUM SERPL-MCNC: 9.4 MG/DL (ref 8.7–10.5)
CALCIUM SERPL-MCNC: 9.5 MG/DL (ref 8.7–10.5)
CHLORIDE SERPL-SCNC: 103 MMOL/L (ref 95–110)
CHLORIDE SERPL-SCNC: 103 MMOL/L (ref 95–110)
CO2 SERPL-SCNC: 21 MMOL/L (ref 23–29)
CO2 SERPL-SCNC: 23 MMOL/L (ref 23–29)
CREAT SERPL-MCNC: 1.4 MG/DL (ref 0.5–1.4)
CREAT SERPL-MCNC: 1.5 MG/DL (ref 0.5–1.4)
DIFFERENTIAL METHOD BLD: ABNORMAL
EOSINOPHIL # BLD AUTO: 0 K/UL (ref 0–0.5)
EOSINOPHIL NFR BLD: 0 % (ref 0–8)
ERYTHROCYTE [DISTWIDTH] IN BLOOD BY AUTOMATED COUNT: 15 % (ref 11.5–14.5)
EST. GFR  (NO RACE VARIABLE): 37 ML/MIN/1.73 M^2
EST. GFR  (NO RACE VARIABLE): 40 ML/MIN/1.73 M^2
GLUCOSE SERPL-MCNC: 101 MG/DL (ref 70–110)
GLUCOSE SERPL-MCNC: 90 MG/DL (ref 70–110)
HCT VFR BLD AUTO: 36 % (ref 37–48.5)
HGB BLD-MCNC: 11.2 G/DL (ref 12–16)
IMM GRANULOCYTES # BLD AUTO: 0.02 K/UL (ref 0–0.04)
IMM GRANULOCYTES NFR BLD AUTO: 0.4 % (ref 0–0.5)
LACTATE SERPL-SCNC: 1.7 MMOL/L (ref 0.5–2.2)
LYMPHOCYTES # BLD AUTO: 1.2 K/UL (ref 1–4.8)
LYMPHOCYTES NFR BLD: 24.3 % (ref 18–48)
MCH RBC QN AUTO: 29.1 PG (ref 27–31)
MCHC RBC AUTO-ENTMCNC: 31.1 G/DL (ref 32–36)
MCV RBC AUTO: 94 FL (ref 82–98)
MONOCYTES # BLD AUTO: 0.6 K/UL (ref 0.3–1)
MONOCYTES NFR BLD: 12 % (ref 4–15)
NEUTROPHILS # BLD AUTO: 3.1 K/UL (ref 1.8–7.7)
NEUTROPHILS NFR BLD: 63.1 % (ref 38–73)
NRBC BLD-RTO: 0 /100 WBC
PLATELET # BLD AUTO: 143 K/UL (ref 150–450)
PMV BLD AUTO: 11 FL (ref 9.2–12.9)
POTASSIUM SERPL-SCNC: 5.7 MMOL/L (ref 3.5–5.1)
POTASSIUM SERPL-SCNC: 6 MMOL/L (ref 3.5–5.1)
RBC # BLD AUTO: 3.85 M/UL (ref 4–5.4)
SODIUM SERPL-SCNC: 140 MMOL/L (ref 136–145)
SODIUM SERPL-SCNC: 140 MMOL/L (ref 136–145)
TROPONIN I SERPL DL<=0.01 NG/ML-MCNC: 7.96 NG/ML (ref 0–0.03)
WBC # BLD AUTO: 4.85 K/UL (ref 3.9–12.7)

## 2025-01-10 PROCEDURE — 80048 BASIC METABOLIC PNL TOTAL CA: CPT | Performed by: NURSE PRACTITIONER

## 2025-01-10 PROCEDURE — 25000003 PHARM REV CODE 250: Performed by: NURSE PRACTITIONER

## 2025-01-10 PROCEDURE — 84484 ASSAY OF TROPONIN QUANT: CPT | Performed by: INTERNAL MEDICINE

## 2025-01-10 PROCEDURE — 25000003 PHARM REV CODE 250: Performed by: FAMILY MEDICINE

## 2025-01-10 PROCEDURE — 85025 COMPLETE CBC W/AUTO DIFF WBC: CPT | Performed by: INTERNAL MEDICINE

## 2025-01-10 PROCEDURE — 21400001 HC TELEMETRY ROOM

## 2025-01-10 PROCEDURE — 36415 COLL VENOUS BLD VENIPUNCTURE: CPT | Mod: XB | Performed by: FAMILY MEDICINE

## 2025-01-10 PROCEDURE — 85730 THROMBOPLASTIN TIME PARTIAL: CPT | Performed by: FAMILY MEDICINE

## 2025-01-10 PROCEDURE — 99233 SBSQ HOSP IP/OBS HIGH 50: CPT | Mod: ,,, | Performed by: INTERNAL MEDICINE

## 2025-01-10 PROCEDURE — 25000003 PHARM REV CODE 250: Performed by: INTERNAL MEDICINE

## 2025-01-10 PROCEDURE — 63600175 PHARM REV CODE 636 W HCPCS: Performed by: INTERNAL MEDICINE

## 2025-01-10 PROCEDURE — 36415 COLL VENOUS BLD VENIPUNCTURE: CPT | Performed by: NURSE PRACTITIONER

## 2025-01-10 PROCEDURE — 83605 ASSAY OF LACTIC ACID: CPT | Performed by: FAMILY MEDICINE

## 2025-01-10 PROCEDURE — 36415 COLL VENOUS BLD VENIPUNCTURE: CPT | Mod: XB | Performed by: INTERNAL MEDICINE

## 2025-01-10 PROCEDURE — 80048 BASIC METABOLIC PNL TOTAL CA: CPT | Mod: 91 | Performed by: FAMILY MEDICINE

## 2025-01-10 RX ADMIN — HEPARIN SODIUM 12 UNITS/KG/HR: 10000 INJECTION, SOLUTION INTRAVENOUS at 06:01

## 2025-01-10 RX ADMIN — SODIUM ZIRCONIUM CYCLOSILICATE 5 G: 5 POWDER, FOR SUSPENSION ORAL at 08:01

## 2025-01-10 RX ADMIN — PANTOPRAZOLE SODIUM 40 MG: 40 TABLET, DELAYED RELEASE ORAL at 08:01

## 2025-01-10 RX ADMIN — CLOPIDOGREL BISULFATE 75 MG: 75 TABLET ORAL at 08:01

## 2025-01-10 RX ADMIN — ASPIRIN 81 MG: 81 TABLET, COATED ORAL at 08:01

## 2025-01-10 RX ADMIN — SODIUM ZIRCONIUM CYCLOSILICATE 5 G: 5 POWDER, FOR SUSPENSION ORAL at 03:01

## 2025-01-10 RX ADMIN — PANTOPRAZOLE SODIUM 40 MG: 40 TABLET, DELAYED RELEASE ORAL at 09:01

## 2025-01-10 RX ADMIN — METOPROLOL SUCCINATE 12.5 MG: 25 TABLET, EXTENDED RELEASE ORAL at 09:01

## 2025-01-10 RX ADMIN — ESCITALOPRAM OXALATE 10 MG: 10 TABLET ORAL at 08:01

## 2025-01-10 RX ADMIN — SODIUM ZIRCONIUM CYCLOSILICATE 5 G: 5 POWDER, FOR SUSPENSION ORAL at 06:01

## 2025-01-10 RX ADMIN — ATORVASTATIN CALCIUM 20 MG: 10 TABLET, FILM COATED ORAL at 08:01

## 2025-01-10 NOTE — ASSESSMENT & PLAN NOTE
-TTE with EF of 15-20%, mildly reduced RV function, severe MR, pulmonary HTN  -Looks better this AM  -Continue Lasix drip  -Continue BB  -Consider Entresto if BP/renal function permits  -Previously did not tolerate WCD

## 2025-01-10 NOTE — ASSESSMENT & PLAN NOTE
BENI is likely due to acute tubular necrosis caused by hypotension and diuretic . Baseline creatinine is  1 . Most recent creatinine and eGFR are listed below.  Recent Labs     01/09/25  0052 01/10/25  0518 01/10/25  0802   CREATININE 1.3 1.4 1.5*   EGFRNORACEVR 43* 40* 37*      Plan  - BENI is worsening. Will continue current treatment  - Avoid nephrotoxins and renally dose meds for GFR listed above  - Monitor urine output, serial BMP, and adjust therapy as needed  - not a dialysis candidate  Concerns for cardiorenal syndrome  Lactate pending

## 2025-01-10 NOTE — SUBJECTIVE & OBJECTIVE
Review of Systems   Reason unable to perform ROS: limited as patient was sleepy during exam.   Cardiovascular:  Negative for chest pain.   Psychiatric/Behavioral:  Positive for memory loss.      Objective:     Vital Signs (Most Recent):  Temp: 97.9 °F (36.6 °C) (01/10/25 1228)  Pulse: 77 (01/10/25 1228)  Resp: 19 (01/10/25 1228)  BP: 131/61 (01/10/25 1228)  SpO2: 99 % (01/10/25 1228) Vital Signs (24h Range):  Temp:  [97.4 °F (36.3 °C)-98.8 °F (37.1 °C)] 97.9 °F (36.6 °C)  Pulse:  [] 77  Resp:  [18-19] 19  SpO2:  [98 %-100 %] 99 %  BP: (108-159)/(60-75) 131/61     Weight: 61.2 kg (134 lb 14.7 oz)  Body mass index is 22.45 kg/m².     SpO2: 99 %       No intake or output data in the 24 hours ending 01/10/25 1305    Lines/Drains/Airways       Peripheral Intravenous Line  Duration                  Peripheral IV - Single Lumen 01/09/25 0049 20 G Anterior;Proximal;Right Forearm 1 day         Peripheral IV - Single Lumen 01/09/25 1408 20 G Anterior;Left Forearm <1 day                       Physical Exam  Vitals reviewed.   Constitutional:       Appearance: She is ill-appearing.      Comments: On supplemental O2   HENT:      Head: Normocephalic and atraumatic.   Eyes:      Pupils: Pupils are equal, round, and reactive to light.   Cardiovascular:      Rate and Rhythm: Normal rate and regular rhythm.      Heart sounds: S1 normal and S2 normal. No murmur heard.  Pulmonary:      Comments: Diminished BS  Abdominal:      Palpations: Abdomen is soft.   Musculoskeletal:      Right lower leg: Edema present.      Left lower leg: Edema present.   Skin:     General: Skin is warm and dry.   Neurological:      Comments: Sleepy but awakens to questions/stimuli            Significant Labs: CMP   Recent Labs   Lab 01/09/25  0052 01/10/25  0518 01/10/25  0802    140 140   K 4.5 6.0* 5.7*    103 103   CO2 26 21* 23   * 90 101   BUN 19 28* 30*   CREATININE 1.3 1.4 1.5*   CALCIUM 9.5 9.4 9.5   PROT 7.0  --   --   "  ALBUMIN 3.5  --   --    BILITOT 1.2*  --   --    ALKPHOS 58  --   --    AST 47*  --   --    ALT 13  --   --    ANIONGAP 13 16 14   , CBC   Recent Labs   Lab 01/09/25  0052 01/09/25  1433 01/10/25  0247   WBC 5.75 5.37 4.85   HGB 11.9* 11.2* 11.2*   HCT 39.2 36.6* 36.0*    149* 143*   , Troponin No results for input(s): "TROPONINIHS" in the last 48 hours., and All pertinent lab results from the last 24 hours have been reviewed.    Significant Imaging: Echocardiogram: Transthoracic echo (TTE) complete (Cupid Only):   Results for orders placed or performed during the hospital encounter of 01/09/25   Echo   Result Value Ref Range    BSA 1.68 m2    LA WIDTH 5.1 cm    RA Width 5.1 cm    LVOT stroke volume 26.9 cm3    LVIDd 5.3 3.5 - 6.0 cm    LV Systolic Volume 112.85 mL    LV Systolic Volume Index 67.6 mL/m2    LVIDs 4.9 (A) 2.1 - 4.0 cm    LV Diastolic Volume 134.31 mL    LV Diastolic Volume Index 80.43 mL/m2    Left Ventricular End Systolic Volume by Teichholz Method 112.85 mL    Left Ventricular End Diastolic Volume by Teichholz Method 134.31 mL    IVS 0.9 0.6 - 1.1 cm    LVOT diameter 1.9 cm    LVOT area 2.8 cm2    FS 7.5 (A) 28 - 44 %    Left Ventricle Relative Wall Thickness 0.30 cm    PW 0.8 0.6 - 1.1 cm    LV mass 162.1 g    LV Mass Index 97.1 g/m2    MV Peak E Sarmad 1.27 m/s    TDI LATERAL 0.06 m/s    TDI SEPTAL 0.04 m/s    E/E' ratio 25.40 m/s    MV Peak A Sarmad 0.98 m/s    TR Max Sarmad 3.83 m/s    E/A ratio 1.30     IVRT 79.92 msec    E wave deceleration time 162.82 msec    LV SEPTAL E/E' RATIO 31.75 m/s    ALISHA 54.0 mL/m2    LV LATERAL E/E' RATIO 21.17 m/s    LA Vol 90.11 cm3    PV Peak S Sarmad 0.65 m/s    PV Peak D Sarmad 0.29 m/s    Pulm vein S/D ratio 2.24     LVOT peak sarmad 0.5 m/s    Left Ventricular Outflow Tract Mean Velocity 0.40 cm/s    Left Ventricular Outflow Tract Mean Gradient 0.68 mmHg    RVOT peak VTI 8.6 cm    TAPSE 2.16 cm    LA size 3.95 cm    Left Atrium Minor Axis 5.07 cm    Left Atrium Major " Axis 5.47 cm    RA Major Axis 5.46 cm    AV mean gradient 9.0 mmHg    AV peak gradient 11.6 mmHg    Ao peak aleksandr 1.7 m/s    Ao VTI 35.7 cm    LVOT peak VTI 9.5 cm    AV valve area 0.8 cm²    AV Velocity Ratio 0.29     AV index (prosthetic) 0.27     KRISTIE by Velocity Ratio 0.8 cm²    Mr max aleksandr 4.58 m/s    Triscuspid Valve Regurgitation Peak Gradient 59 mmHg    PV mean gradient 1 mmHg    RVOT peak aleksandr 0.45 m/s    Ao root annulus 3.12 cm    STJ 3.00 cm    Ascending aorta 3.09 cm    IVC diameter 1.84 cm    Mean e' 0.05 m/s    ZLVIDS 4.17     ZLVIDD 1.27     TV resting pulmonary artery pressure 67 mmHg    RV TB RVSP 12 mmHg    Est. RA pres 8 mmHg    Narrative      Left Ventricle: The left ventricle is mildly dilated. Regional wall   motion abnormalities present. There is severely reduced systolic function   with a visually estimated ejection fraction of 15 - 20%. There is   diastolic dysfunction.    Right Ventricle: Normal right ventricular cavity size. Systolic   function is mildly reduced.    Left Atrium: Left atrium is severely dilated.    Right Atrium: Right atrium is dilated.    Aortic Valve: There is mild aortic valve sclerosis.    Mitral Valve: There is moderate bileaflet sclerosis. There is mild   posterior mitral annular calcification present. There is severe   regurgitation with an eccentric jet.    Tricuspid Valve: There is moderate regurgitation.    Pulmonary Artery: There is severe pulmonary hypertension. The estimated   pulmonary artery systolic pressure is 67 mmHg.    IVC/SVC: Intermediate venous pressure at 8 mmHg.     , EKG: Reviewed, and X-Ray: CXR: X-Ray Chest 1 View (CXR): No results found for this visit on 01/09/25. and X-Ray Chest PA and Lateral (CXR): No results found for this visit on 01/09/25.

## 2025-01-10 NOTE — PROGRESS NOTES
[facility] - Telemetry ([facility])  [facility] Medicine  Progress Note    Patient Name: Violeta Scherer  MRN: [id number]  Patient Class: IP- Inpatient   Admission Date: 1/9/2025  Length of Stay: 1 days  Attending Physician: Calvin Schrader MD  Primary Care Provider: Keara Lee FNP        Subjective     Principal Problem:NSTEMI (non-ST elevated myocardial infarction)        HPI:  Violeta Scherer is a 74 y.o. female with a PMH  has a past medical history of Hypertension, Other pulmonary embolism without acute cor pulmonale, and Stroke.presented to the Emergency Department for evaluation of N/V/D, general weakness and bilateral leg edema which onset gradually 1 day ago. Pt's granddaughter noticed pt has been weak and requiring more assistance for the past day. She also reports rhinorrhea and congestion which onset about a week ago but worsened yesterday. Pt's granddaughter reports epistaxis onset after blowing nose about 20 minutes PTA. Symptoms are constant and moderate in severity. No mitigating or exacerbating factors reported. Associated sxs include leg swelling and SOB. Patient denies any chest pain, abdominal pain, myalgias, headache, fever, and all other sxs at this time. No prior Tx reported. Pt uses O2 between 2-4L/min via NC at home. No further complaints or concerns at this time.     ER workup revealed CBC to be unremarkable.  CMP revealed  mg/dL.  BNP>4,900.  Troponin 3.045.  Vitals stating.  325 mg aspirin given.  Chest x-ray cardiomegaly with perihilar interstitial opacities with CHF.  EKG was sinus rhythm with ventricular rate 81 beats per minute and a QT/QTC of 396/460.  Cardiology consulted.  We will see in a.m. as consult.  Recommend Hospital Medicine admit for NSTEMI.  Patient in agreement with treatment plan.  Patient admitted under inpatient status.        PCP: Keara Lee      Overview/Hospital Course:  1/10 admitted for congestive heart failure exacerbation and elevated troponin(s). Cardiology  consulted and recommended heparin x 48h and continue lasix infusion. Did not tolerate wearing lifevest, caused anxiety. Sleep disturbance overnight.    Interval History: See hospital course for today      Review of Systems   Unable to perform ROS: Other (sleeping)   Psychiatric/Behavioral:  Positive for sleep disturbance.      Objective:     Vital Signs (Most Recent):  Temp: 97.9 °F (36.6 °C) (01/10/25 1228)  Pulse: 70 (01/10/25 1340)  Resp: 19 (01/10/25 1228)  BP: 131/61 (01/10/25 1228)  SpO2: 99 % (01/10/25 1228) Vital Signs (24h Range):  Temp:  [97.4 °F (36.3 °C)-98.8 °F (37.1 °C)] 97.9 °F (36.6 °C)  Pulse:  [] 70  Resp:  [18-19] 19  SpO2:  [98 %-100 %] 99 %  BP: (108-134)/(60-75) 131/61     Weight: 61.2 kg (134 lb 14.7 oz)  Body mass index is 22.45 kg/m².  No intake or output data in the 24 hours ending 01/10/25 1419      Physical Exam  Vitals and nursing note reviewed.   Constitutional:       General: She is sleeping. She is not in acute distress.     Appearance: She is ill-appearing. She is not toxic-appearing.      Interventions: Nasal cannula in place.   HENT:      Head: Normocephalic and atraumatic.   Cardiovascular:      Rate and Rhythm: Normal rate.      Heart sounds: Murmur heard.   Pulmonary:      Effort: Pulmonary effort is normal.      Breath sounds: Decreased air movement present.   Abdominal:      Palpations: Abdomen is soft.      Tenderness: There is no abdominal tenderness.   Genitourinary:     Comments: sullivan  Musculoskeletal:      Right lower leg: Edema present.      Left lower leg: Edema present.   Skin:     General: Skin is warm and dry.   Neurological:      Mental Status: She is easily aroused. She is lethargic.      Motor: Weakness present.             Significant Labs: All pertinent labs within the past 24 hours have been reviewed.  CBC:   Recent Labs   Lab 01/09/25  0052 01/09/25  1433 01/10/25  0247   WBC 5.75 5.37 4.85   HGB 11.9* 11.2* 11.2*   HCT 39.2 36.6* 36.0*    149*  143*     CMP:   Recent Labs   Lab 01/09/25  0052 01/10/25  0518 01/10/25  0802    140 140   K 4.5 6.0* 5.7*    103 103   CO2 26 21* 23   * 90 101   BUN 19 28* 30*   CREATININE 1.3 1.4 1.5*   CALCIUM 9.5 9.4 9.5   PROT 7.0  --   --    ALBUMIN 3.5  --   --    BILITOT 1.2*  --   --    ALKPHOS 58  --   --    AST 47*  --   --    ALT 13  --   --    ANIONGAP 13 16 14       Significant Imaging: I have reviewed all pertinent imaging results/findings within the past 24 hours.  CXR: I have reviewed all pertinent results/findings within the past 24 hours and my personal findings are:  stable congestive heart failure   Echo: I have reviewed all pertinent results/findings within the past 24 hours and my personal findings are:  ejection fraction 15-20%    Assessment and Plan     * NSTEMI (non-ST elevated myocardial infarction)  Cardiology consulted  Recommending heparin x 48h      BENI (acute kidney injury)  BENI is likely due to acute tubular necrosis caused by hypotension and diuretic . Baseline creatinine is  1 . Most recent creatinine and eGFR are listed below.  Recent Labs     01/09/25  0052 01/10/25  0518 01/10/25  0802   CREATININE 1.3 1.4 1.5*   EGFRNORACEVR 43* 40* 37*      Plan  - BENI is worsening. Will continue current treatment  - Avoid nephrotoxins and renally dose meds for GFR listed above  - Monitor urine output, serial BMP, and adjust therapy as needed  - not a dialysis candidate  Concerns for cardiorenal syndrome  Lactate pending    History of COPD  Patient's COPD is with exacerbation noted by continued dyspnea and worsening of baseline hypoxia currently likely secondary to CHF exacerbation.  Patient is currently off COPD Pathway. Continue scheduled inhalers  duonebs and Supplemental oxygen and monitor respiratory status closely.         Carotid stenosis, bilateral  Compliant with Plavix.  Denies any new or worsening symptoms.    Plan:   -continue Plavix      Hypertension  Chronic, controlled.   Latest blood pressure and vitals reviewed-   Temp:  [97.4 °F (36.3 °C)-98.8 °F (37.1 °C)]   Pulse:  []   Resp:  [18-19]   BP: (108-134)/(60-75)   SpO2:  [98 %-100 %] .   Home meds for hypertension were reviewed and noted below.   Hypertension Medications               furosemide (LASIX) 20 MG tablet Take 1 tablet (20 mg total) by mouth 2 (two) times a day.    losartan (COZAAR) 25 MG tablet Take 1 tablet (25 mg total) by mouth once daily.    metoprolol succinate (TOPROL-XL) 25 MG 24 hr tablet Take 0.5 tablets (12.5 mg total) by mouth every evening.            While in the hospital, will manage blood pressure as follows; Continue home antihypertensive regimen    Will utilize p.r.n. blood pressure medication only if patient's blood pressure greater than  160/90 and she develops symptoms such as worsening chest pain or shortness of breath.      Acute exacerbation of CHF (congestive heart failure)  Patient has  unspecified  heart failure that is Acute on chronic. On presentation their CHF was decompensated. Evidence of decompensated CHF on presentation includes: edema, orthopnea, paroxysmal nocturnal dyspnea (PND), dyspnea on exertion (KO), and shortness of breath. The etiology of their decompensation is likely dietary indiscretion. Most recent BNP and echo results are listed below.  Recent Labs     01/09/25  0052   BNP >4,900*       Latest ECHO  Results for orders placed during the hospital encounter of 01/19/23    Echo    Interpretation Summary  · The left ventricle is moderately enlarged with concentric hypertrophy and severely decreased systolic function.  · The estimated ejection fraction is 25%.  · Grade II left ventricular diastolic dysfunction.  · There is severe left ventricular global hypokinesis.  · Normal right ventricular size with normal right ventricular systolic function.  · There is mild-to-moderate aortic valve stenosis.  · Aortic valve area is 0.88 cm2; peak velocity is 2.49 m/s; mean gradient  is 17 mmHg.  · Moderate mitral regurgitation.  · Moderate tricuspid regurgitation.  · Elevated central venous pressure (15 mmHg).  · The estimated PA systolic pressure is 61 mmHg.  · There is pulmonary hypertension.  · Trivial circumferential pericardial effusion. Effusion is fluid.    Current Heart Failure Medications  metoprolol succinate (TOPROL-XL) 24 hr split tablet 12.5 mg, Nightly, Oral  furosemide (Lasix) 200 mg in 0.9% NaCl SolP 100 mL continuous infusion (conc: 2 mg/mL), Continuous, Intravenous  , Daily, Oral    Plan  - Monitor strict I&Os and daily weights.    - Place on telemetry  - Low sodium diet  - Place on fluid restriction of 2 L.   - Cardiology has been consulted  - The patient's volume status is improving but not at their baseline as indicated by edema, orthopnea, paroxysmal nocturnal dyspnea (PND), dyspnea on exertion (KO), and shortness of breath    Lasix infusion    GERD (gastroesophageal reflux disease)  Chronic. Stable. Currently asymptomatic. Home medications include PPI/Antacids as needed.  Plan:  -Continue PPI/Antacids as needed         HLD (hyperlipidemia)  Patient is chronically on statin.will continue for now. Last Lipid Panel:   Lab Results   Component Value Date    CHOL 166 10/01/2024    HDL 42 (L) 10/01/2024    LDLCALC 112 10/01/2024    TRIG 59 10/01/2024    CHOLHDL 34.1 01/20/2023     Plan:  -Continue home medication  -low fat/low calorie diet          VTE Risk Mitigation (From admission, onward)           Ordered     heparin 25,000 units in dextrose 5% (100 units/ml) IV bolus from bag LOW INTENSITY nomogram - [facility]  As needed (PRN)        Question:  Heparin Infusion Adjustment (DO NOT MODIFY ANSWER)  Answer:  [email or web address]\epic\Images\Pharmacy\HeparinInfusions\heparin LOW INTENSITY nomogram for [facility] [id number]    01/09/25 1303     heparin 25,000 units in dextrose 5% (100 units/ml) IV bolus from bag LOW INTENSITY nomogram - [facility]  As needed (PRN)        Question:  Heparin Infusion  Adjustment (DO NOT MODIFY ANSWER)  Answer:  [email or web address]\epic\Images\Pharmacy\HeparinInfusions\heparin LOW INTENSITY nomogram for [facility] [id number]    01/09/25 1303     heparin 25,000 units in dextrose 5% 250 mL (100 units/mL) infusion LOW INTENSITY nomogram - [facility]  Continuous        Question:  Begin at (units/kg/hr)  Answer:  12    01/09/25 1303     IP VTE HIGH RISK PATIENT  Once         01/09/25 0234     Place sequential compression device  Until discontinued         01/09/25 0234                    Discharge Planning   SHIVANI:      Code Status: Full Code   Medical Readiness for Discharge Date:   Discharge Plan A: Home                        Calvin Schrader MD  Department of Hospital Medicine   [facility] - Telemetry ([facility])

## 2025-01-10 NOTE — ASSESSMENT & PLAN NOTE
IV Lasix gtt.  Low salt diet.  GDMT advised for severe CHF as tolerated.  Echocardiogram.      1/10/25  -TTE with EF of 15-20%, mildly reduced RV function, severe MR, pulmonary HTN  -Looks better this AM  -Continue Lasix drip  -Continue BB  -Consider Entresto if BP/renal function permits  -Previously did not tolerate WCD

## 2025-01-10 NOTE — ASSESSMENT & PLAN NOTE
[facility] 2023 diffuse multivessel CAD; med mgt advised.    OMT advised for CAD.

## 2025-01-10 NOTE — PROGRESS NOTES
"[facility]) nurse navigator notified of HFTCC candidate in need of education and introduction to 4-6 week program.      PT  lying in bed  with daughter at bedside. Introduced self to pt as HFTCC nurse navigator.     Patient given "Home Care Guide for Heart Failure Patients" , "Heart Failure [facility]itional Care [facility]" flyer and "Daily weight and symptom tracker".  Encouraged pt and daughter to review information.      Reviewed the following key points of HFTCC program with pt and family:   1.) Take your medications as directed.    2.) Weight yourself daily   3.) Follow low salt and limited fluid diet.    4.) Stop smoking and start exercising   5.) Go to your appointments and call your team.      Pt reminded to follow Symptom tracker and to call at the onset of symptoms according to tracker.     Reviewed plan for follow up once discharged to include phone calls, in person and virtual visits to assist pt optimizing their heart failure medication regimen and encouraging healthy lifestyle modifications.  Reminded pt that program will assist them over the next 4-6 weeks and then patient will be transferred to long term care provider .  Reminded pt how to contact HFTCC navigator via phone and or via [facility].     Pt given appointment or instructed appointment will be printed on hospital discharge paperwork.     Pt also reminded HF nurse will call 48-72 hours after discharge to check on them.    Daughter verbalize read back of information given.  Encouraged pt and family to read over information often and contact team with any questions or concerns.      "

## 2025-01-10 NOTE — HOSPITAL COURSE
1/10/25-Patient seen and examined today, resting in bed. Family reports she sat up this AM and ate breakfast. Sleepy during exam. SOB seems improved. No reports of CP. Labs reviewed. Creatinine 1.4, K 6.0. Troponin trending down. TTE with EF of 15-20%, DD, mildly reduced RV function, severe MR, pulmonary HTN. Previously could not tolerate WCD.    1/11/25: Pt seen this am. No acute issues noted.  Discussed w family.  Denies CP.  Dyspnea improved. Got out of bed.  Labs reviewed.  On heparin gtt at time of visit.

## 2025-01-10 NOTE — PLAN OF CARE
POC reviewed with the patient, who verbalized understanding and had no questions at this time.  Pt is alert and oriented x4, though speaking in an extremely low tone. No apparent distress noted.  Cardiac monitor shows normal sinus rhythm.pt is receiving 5L oxygen via nasal cannula.  Lasix infusion at 2.5 ml/hr and heparin infusion at 12 units x 2, therapeutic.  Pt remains free from falls and has no complaints at this time.  Safety measures are in place, and monitoring will continue.  Informed the patient to call for assistance before getting up, and the pt verbalized understanding.  Hourly rounding and chart checks have been completed.

## 2025-01-10 NOTE — HOSPITAL COURSE
1/10 admitted for congestive heart failure exacerbation and elevated troponin(s). Cardiology consulted and recommended heparin x 48h and continue lasix infusion. Did not tolerate wearing lifevest, caused anxiety. Sleep disturbance overnight.    1/11  Heparin infused x 48h due to elevated troponin(s). Troponin(s) peaked and trending down. Diffuse disease with medical management advised due to poor functional status/respiratory status. Intolerant to wearing lifevest. On baseline oxygen requirements.    No acute distress. No respiratory distress on baseline oxygen requirements. Sleeping but easily awakens. Lower extremity edema. Frail and ill appearing    Patient seen and evaluated by me. Patient was determined to be suitable for discharge. Patient deemed stable for discharge to home with homehealth physical/occupational therapy and nurse practitioner to visit home program. Referrals placed for congestive heart failure clinic and copd clinic. Patient would benefit from referral to palliative, but family unrealistic at this time.

## 2025-01-10 NOTE — PROGRESS NOTES
[facility] - Telemetry ([facility])  Cardiology  Progress Note    Patient Name: Violeta Scherer  MRN: [id number]  Admission Date: 1/9/2025  Hospital Length of Stay: 1 days  Code Status: Full Code   Attending Physician: Calvin Schrader MD   Primary Care Physician: Keara Lee FNP  Expected Discharge Date:   Principal Problem:NSTEMI (non-ST elevated myocardial infarction)    Subjective:   HPI:  Cardiology consulted for CHF, elevated troponin.  Pt has hx of severe CHF, multivessel CAD, severe MR, CVA, PE, COPD (home O2 dependent).  She was admitted Jan 2023 w NSTEMI, CHF and had LHC (Dr. Brown) and echo done at that time.  Cath reviewed: diffuse CAD noted with no good target for PCI (had 99% distal LCX, 99% LPDA, 70% mid LCX, small diffusely diseased RCA and LAD vessels.  EF 20% on cath w severe MR noted.  Echo Jan 2023 EF 25%, mild-mod AS, mod MR/TR, PAP 61 mmHg.  Pt now admitted with congestion, runny nose, weakness, nausea/vomiting, edema, KO, diarrhea, had nose bleed as well.  Ecg on admit NSR, anterior/inferior infarct noted with Q waves in anterior/inferior leads w ST elevation, LVH.  Dr Brown, on call Cards, deemed old MI.  Med tx advised.  Troponin shaye to 13.  BNP > 4900  She denies angina/CP sxs at time of consult.  She is weak, frail condition.  She seems to have possible dementia -- does not know year, and seems very forgetful.     Also it is made of note, that pt has not been seen by [facility] since 2023 and has had numerous CHF clinic appts that pt was no show.       Hospital Course:   1/10/25-Patient seen and examined today, resting in bed. Family reports she sat up this AM and ate breakfast. Sleepy during exam. SOB seems improved. No reports of CP. Labs reviewed. Creatinine 1.4, K 6.0. Troponin trending down. TTE with EF of 15-20%, DD, mildly reduced RV function, severe MR, pulmonary HTN. Previously could not tolerate WCD.        Review of Systems   Reason unable to perform ROS: limited as patient  was sleepy during exam.   Cardiovascular:  Negative for chest pain.   Psychiatric/Behavioral:  Positive for memory loss.      Objective:     Vital Signs (Most Recent):  Temp: 97.9 °F (36.6 °C) (01/10/25 1228)  Pulse: 77 (01/10/25 1228)  Resp: 19 (01/10/25 1228)  BP: 131/61 (01/10/25 1228)  SpO2: 99 % (01/10/25 1228) Vital Signs (24h Range):  Temp:  [97.4 °F (36.3 °C)-98.8 °F (37.1 °C)] 97.9 °F (36.6 °C)  Pulse:  [] 77  Resp:  [18-19] 19  SpO2:  [98 %-100 %] 99 %  BP: (108-159)/(60-75) 131/61     Weight: 61.2 kg (134 lb 14.7 oz)  Body mass index is 22.45 kg/m².     SpO2: 99 %       No intake or output data in the 24 hours ending 01/10/25 1305    Lines/Drains/Airways       Peripheral Intravenous Line  Duration                  Peripheral IV - Single Lumen 01/09/25 0049 20 G Anterior;Proximal;Right Forearm 1 day         Peripheral IV - Single Lumen 01/09/25 1408 20 G Anterior;Left Forearm <1 day                       Physical Exam  Vitals reviewed.   Constitutional:       Appearance: She is ill-appearing.      Comments: On supplemental O2   HENT:      Head: Normocephalic and atraumatic.   Eyes:      Pupils: Pupils are equal, round, and reactive to light.   Cardiovascular:      Rate and Rhythm: Normal rate and regular rhythm.      Heart sounds: S1 normal and S2 normal. No murmur heard.  Pulmonary:      Comments: Diminished BS  Abdominal:      Palpations: Abdomen is soft.   Musculoskeletal:      Right lower leg: Edema present.      Left lower leg: Edema present.   Skin:     General: Skin is warm and dry.   Neurological:      Comments: Sleepy but awakens to questions/stimuli            Significant Labs: CMP   Recent Labs   Lab 01/09/25  0052 01/10/25  0518 01/10/25  0802    140 140   K 4.5 6.0* 5.7*    103 103   CO2 26 21* 23   * 90 101   BUN 19 28* 30*   CREATININE 1.3 1.4 1.5*   CALCIUM 9.5 9.4 9.5   PROT 7.0  --   --    ALBUMIN 3.5  --   --    BILITOT 1.2*  --   --    ALKPHOS 58  --   --    AST  "47*  --   --    ALT 13  --   --    ANIONGAP 13 16 14   , CBC   Recent Labs   Lab 01/09/25  0052 01/09/25  1433 01/10/25  0247   WBC 5.75 5.37 4.85   HGB 11.9* 11.2* 11.2*   HCT 39.2 36.6* 36.0*    149* 143*   , Troponin No results for input(s): "TROPONINIHS" in the last 48 hours., and All pertinent lab results from the last 24 hours have been reviewed.    Significant Imaging: Echocardiogram: Transthoracic echo (TTE) complete (Cupid Only):   Results for orders placed or performed during the hospital encounter of 01/09/25   Echo   Result Value Ref Range    BSA 1.68 m2    LA WIDTH 5.1 cm    RA Width 5.1 cm    LVOT stroke volume 26.9 cm3    LVIDd 5.3 3.5 - 6.0 cm    LV Systolic Volume 112.85 mL    LV Systolic Volume Index 67.6 mL/m2    LVIDs 4.9 (A) 2.1 - 4.0 cm    LV Diastolic Volume 134.31 mL    LV Diastolic Volume Index 80.43 mL/m2    Left Ventricular End Systolic Volume by Teichholz Method 112.85 mL    Left Ventricular End Diastolic Volume by Teichholz Method 134.31 mL    IVS 0.9 0.6 - 1.1 cm    LVOT diameter 1.9 cm    LVOT area 2.8 cm2    FS 7.5 (A) 28 - 44 %    Left Ventricle Relative Wall Thickness 0.30 cm    PW 0.8 0.6 - 1.1 cm    LV mass 162.1 g    LV Mass Index 97.1 g/m2    MV Peak E Sarmad 1.27 m/s    TDI LATERAL 0.06 m/s    TDI SEPTAL 0.04 m/s    E/E' ratio 25.40 m/s    MV Peak A Sarmad 0.98 m/s    TR Max Sarmad 3.83 m/s    E/A ratio 1.30     IVRT 79.92 msec    E wave deceleration time 162.82 msec    LV SEPTAL E/E' RATIO 31.75 m/s    ALISHA 54.0 mL/m2    LV LATERAL E/E' RATIO 21.17 m/s    LA Vol 90.11 cm3    PV Peak S Sarmad 0.65 m/s    PV Peak D Sarmad 0.29 m/s    Pulm vein S/D ratio 2.24     LVOT peak sarmad 0.5 m/s    Left Ventricular Outflow Tract Mean Velocity 0.40 cm/s    Left Ventricular Outflow Tract Mean Gradient 0.68 mmHg    RVOT peak VTI 8.6 cm    TAPSE 2.16 cm    LA size 3.95 cm    Left Atrium Minor Axis 5.07 cm    Left Atrium Major Axis 5.47 cm    RA Major Axis 5.46 cm    AV mean gradient 9.0 mmHg    AV peak " gradient 11.6 mmHg    Ao peak aleksandr 1.7 m/s    Ao VTI 35.7 cm    LVOT peak VTI 9.5 cm    AV valve area 0.8 cm²    AV Velocity Ratio 0.29     AV index (prosthetic) 0.27     KRISTIE by Velocity Ratio 0.8 cm²    Mr max aleksandr 4.58 m/s    Triscuspid Valve Regurgitation Peak Gradient 59 mmHg    PV mean gradient 1 mmHg    RVOT peak aleksandr 0.45 m/s    Ao root annulus 3.12 cm    STJ 3.00 cm    Ascending aorta 3.09 cm    IVC diameter 1.84 cm    Mean e' 0.05 m/s    ZLVIDS 4.17     ZLVIDD 1.27     TV resting pulmonary artery pressure 67 mmHg    RV TB RVSP 12 mmHg    Est. RA pres 8 mmHg    Narrative      Left Ventricle: The left ventricle is mildly dilated. Regional wall   motion abnormalities present. There is severely reduced systolic function   with a visually estimated ejection fraction of 15 - 20%. There is   diastolic dysfunction.    Right Ventricle: Normal right ventricular cavity size. Systolic   function is mildly reduced.    Left Atrium: Left atrium is severely dilated.    Right Atrium: Right atrium is dilated.    Aortic Valve: There is mild aortic valve sclerosis.    Mitral Valve: There is moderate bileaflet sclerosis. There is mild   posterior mitral annular calcification present. There is severe   regurgitation with an eccentric jet.    Tricuspid Valve: There is moderate regurgitation.    Pulmonary Artery: There is severe pulmonary hypertension. The estimated   pulmonary artery systolic pressure is 67 mmHg.    IVC/SVC: Intermediate venous pressure at 8 mmHg.     , EKG: Reviewed, and X-Ray: CXR: X-Ray Chest 1 View (CXR): No results found for this visit on 01/09/25. and X-Ray Chest PA and Lateral (CXR): No results found for this visit on 01/09/25.  Assessment and Plan:   Patient who presents with atypical NSTEMI/decompensated CHF. Stable/improving. Troponin trending down. Continue IV diuresis and OMT. Hep gtt x 48 hours.     * NSTEMI (non-ST elevated myocardial infarction)  Atypical presentation for NSTEMI.  No typical  CP/angina.  Seems to be secondary NSTEMI perhaps due to being sick recently maybe with cold/viral or other issues.  ECG on admit with inferior/anterior infarct w Q waves noted.    Pt is frail/weak; likely best treated with continued medical tx.    IV heparin gtt x 48 hours as tolerated.  DAPT.  Statin tx.  Echocardiogram.    1/10/25  -Stable overnight, improved  -Troponin trending down, no complaints of CP  -Continue DAPT, BB, heparin gtt x 48 hours  -TTE with EF of 15-20%, mildly reduced RV function, severe MR, pulmonary HTN  -Continue Lasix gtt for additional day      Nonrheumatic mitral valve regurgitation  Severe MR on 2023 cath and likely ischemic MR.  Echo to reevaluate.    1/10/25  -TTE with severe MR    Pulmonary hypertension  Echo reevaluation.    Abnormal ECG  Inferior/anterior infarct noted.  See CAD/NSTEMI section.    CAD, multiple vessel  [facility] 2023 diffuse multivessel CAD; med mgt advised.    OMT advised for CAD.    Acute on chronic combined systolic and diastolic heart failure  IV Lasix gtt.  Low salt diet.  GDMT advised for severe CHF as tolerated.  Echocardiogram.      1/10/25  -TTE with EF of 15-20%, mildly reduced RV function, severe MR, pulmonary HTN  -Looks better this AM  -Continue Lasix drip  -Continue BB  -Consider Entresto if BP/renal function permits  -Previously did not tolerate WCD    Hypertension  -Titrate medications    Acute exacerbation of CHF (congestive heart failure)  -TTE with EF of 15-20%, mildly reduced RV function, severe MR, pulmonary HTN  -Looks better this AM  -Continue Lasix drip  -Continue BB  -Consider Entresto if BP/renal function permits  -Previously did not tolerate WCD    HLD (hyperlipidemia)  -Statin        VTE Risk Mitigation (From admission, onward)           Ordered     heparin 25,000 units in dextrose 5% (100 units/ml) IV bolus from bag LOW INTENSITY nomogram - [facility]  As needed (PRN)        Question:  Heparin Infusion Adjustment (DO NOT MODIFY ANSWER)  Answer:   [email or web address]\epic\Images\Pharmacy\HeparinInfusions\heparin LOW INTENSITY nomogram for [facility] [id number]    01/09/25 1303     heparin 25,000 units in dextrose 5% (100 units/ml) IV bolus from bag LOW INTENSITY nomogram - [facility]  As needed (PRN)        Question:  Heparin Infusion Adjustment (DO NOT MODIFY ANSWER)  Answer:  [email or web address]\epic\Images\Pharmacy\HeparinInfusions\heparin LOW INTENSITY nomogram for [facility] [id number]    01/09/25 1303     heparin 25,000 units in dextrose 5% 250 mL (100 units/mL) infusion LOW INTENSITY nomogram - [facility]  Continuous        Question:  Begin at (units/kg/hr)  Answer:  12    01/09/25 1303     IP VTE HIGH RISK PATIENT  Once         01/09/25 0234     Place sequential compression device  Until discontinued         01/09/25 0234                    Shruthi Fisher PA-C  Cardiology  [facility] - Telemetry ([facility])

## 2025-01-10 NOTE — PLAN OF CARE
POC reviewed with pt. Pt verbalizes understanding of POC. No questions at this time.  AAOx4. NADN. See cards for recs. Heparin gtt x48 hours. Monitor k   NSR on cardiac monitor.  Pt remains free of falls.  No complaints at this time.  Safety measures in place. Will continue to monitor.  Informed pt to call for assistance before getting up. Pt verbalizes understanding.  Hourly rounding and chart check complete. POC reviewed with pt. Pt verbalizes understanding of POC. No questions at this time.

## 2025-01-10 NOTE — CONSULTS
"Food & Nutrition Education        Diet Education: Cardiac, Fluid restriction diet    Learners: Patient's family members       Nutrition Education provided with handouts:   Heart Healthy Nutrition Therapy"  "Fluid Restricted Nutrition Therapy"   (nutritioncaremanual.org)       Comments:   PMH: HLD, GERD, Acute exacerbation of CHF, HTN, Bilateral carotid stenosis, Acute on chronic combined HF, Multiple vessel CAD, Abnormal ECG, Pulmonary HTN, Non rheumatic mitral valve regurgitation    74 y.o. Female admitted for NSTEMI (non-ST elevated myocardial infarction). Pt is currently on a Low sodium 2 gm, 1500 mL fluid restriction diet. Visited pt at bedside, pt was sleeping, pt's family members present. Stated that the pt does not have a big appetite, eats maybe 2 meals/day and drinks Boost, reported 25% PO intake currently d/t pt is a "picky eater" and needs soft foods d/t pt has no teeth, requested ONS, RD protein/calorie benefits of [place], encouraged PO intake and ONS as snacks, pt's family member expressed understanding, RD added to pt's orders and trays, worked with family and dinning associate for pt's dinner order. Pt's daughter stated pt's UBW is 119 lbs last weighed at MD's office, current weight charted 1/10/25 134 lbs.     RD educated patient's family members on low sodium, general healthful diet r/t recent hospital diagnosis. Recommended a well balanced diet with a variety of fresh foods, fruits and vegetables (5 cups/day), whole grains (3 oz/day), and fat-free or low fat dairy. Discussed reading food packages, food labels, and nutrition facts labels to identify nutrient content of foods.       Discussed the importance of limiting sodium to less than 2,000 mg per day. Recommended salt free seasonings and other herbs and spices in meals to enhance flavor without additional sodium.       Discussed dietary sources of cholesterol, the importance of incorporating healthy fats into the diet, and avoiding saturated " and trans fats for heart health. For a generally healthy diet, aim for total fat less than 25-35% of calories.       Discussed the importance of fiber (especially soluble fiber), dietary sources, and a goal intake of >20-30g/day.     Discussed 1500 ml fluid restriction per MD and dietary sources of fluid. RD recommended using a cup with measurements for fluids and to try to consume small sips spread throughout the day rather than a lot at one time.      Pt's grand daughter loves to cooks and prepares some of pt's food.  Pt's family members reported that the pt likes to eat [employer] happy meals and frozen pot pies, also consumes Anthony D juice and instant oatmeal, informed family of high sodium contents of those foods. Pt's family expressed understanding and appreciation for nutrition education provided, encouraged pt and pt family to read handouts and use RD contact information with any further questions/concerns they may have. Also encouraged pt's family to talk to health insurance company and inquire about talking to an outpatient RD to assist with recipes and meal planning.     Nutrition Related Social Determinants of Health: SDOH: Adequate food in home environment and None Identified      NFPE not performed, pt sleeping, will perform at follow up.  All questions and concerns answered.   Provided handout with dietitian's contact information.   *Please re-consult as needed.   Thank you,   MAKENNA Stiles, RDN, LDN

## 2025-01-10 NOTE — ASSESSMENT & PLAN NOTE
Chronic, controlled.  Latest blood pressure and vitals reviewed-   Temp:  [97.4 °F (36.3 °C)-98.8 °F (37.1 °C)]   Pulse:  []   Resp:  [18-19]   BP: (108-134)/(60-75)   SpO2:  [98 %-100 %] .   Home meds for hypertension were reviewed and noted below.   Hypertension Medications               furosemide (LASIX) 20 MG tablet Take 1 tablet (20 mg total) by mouth 2 (two) times a day.    losartan (COZAAR) 25 MG tablet Take 1 tablet (25 mg total) by mouth once daily.    metoprolol succinate (TOPROL-XL) 25 MG 24 hr tablet Take 0.5 tablets (12.5 mg total) by mouth every evening.            While in the hospital, will manage blood pressure as follows; Continue home antihypertensive regimen    Will utilize p.r.n. blood pressure medication only if patient's blood pressure greater than  160/90 and she develops symptoms such as worsening chest pain or shortness of breath.

## 2025-01-10 NOTE — ASSESSMENT & PLAN NOTE
Atypical presentation for NSTEMI.  No typical CP/angina.  Seems to be secondary NSTEMI perhaps due to being sick recently maybe with cold/viral or other issues.  ECG on admit with inferior/anterior infarct w Q waves noted.    Pt is frail/weak; likely best treated with continued medical tx.    IV heparin gtt x 48 hours as tolerated.  DAPT.  Statin tx.  Echocardiogram.    1/10/25  -Stable overnight, improved  -Troponin trending down, no complaints of CP  -Continue DAPT, BB, heparin gtt x 48 hours  -TTE with EF of 15-20%, mildly reduced RV function, severe MR, pulmonary HTN  -Continue Lasix gtt for additional day     independent

## 2025-01-10 NOTE — PLAN OF CARE
A221/A221 MARYJasper Scherer is a 74 y.o.female admitted on 1/9/2025 for NSTEMI (non-ST elevated myocardial infarction)   Code Status: Full Code MRN: [id number]   Review of patient's allergies indicates:   Allergen Reactions    Penicillins Hives    Bactrim [sulfamethoxazole-trimethoprim] Hives    Batroxobin Hives    Nitrofurantoin monohyd/m-cryst Itching    Codeine Rash     Past Medical History:   Diagnosis Date    CHF (congestive heart failure)     COPD (chronic obstructive pulmonary disease)     GERD (gastroesophageal reflux disease)     HLD (hyperlipidemia)     Hypertension     Other pulmonary embolism without acute cor pulmonale     approx greater than 4 years    Stroke     TIA      PRN meds    acetaminophen, 650 mg, Q6H PRN  albuterol-ipratropium, 3 mL, Q4H PRN  heparin (PORCINE), 60 Units/kg, PRN  heparin (PORCINE), 30 Units/kg, PRN  ondansetron, 4 mg, Q8H PRN  sodium chloride 0.9%, 10 mL, PRN      Chart check completed. Will continue plan of care.      Orientation: oriented x 4  Nya Coma Scale Score: 15     Lead Monitored: Lead II Rhythm: normal sinus rhythm    Cardiac/Telemetry Box Number: [id number]  VTE Core Measure: Pharmacological prophylaxis initiated/maintained Last Bowel Movement: 01/08/25  Diet Low Sodium, 2gm Fluid - 1500mL  Voiding Characteristics: external catheter  Praful Score: 15  Fall Risk Score: 14  Accucheck []   Freq?      Lines/Drains/Airways       Peripheral Intravenous Line  Duration                  Peripheral IV - Single Lumen 01/09/25 0049 20 G Anterior;Proximal;Right Forearm <1 day         Peripheral IV - Single Lumen 01/09/25 1408 20 G Anterior;Left Forearm <1 day

## 2025-01-10 NOTE — SUBJECTIVE & OBJECTIVE
Interval History: See hospital course for today      Review of Systems   Unable to perform ROS: Other (sleeping)   Psychiatric/Behavioral:  Positive for sleep disturbance.      Objective:     Vital Signs (Most Recent):  Temp: 97.9 °F (36.6 °C) (01/10/25 1228)  Pulse: 70 (01/10/25 1340)  Resp: 19 (01/10/25 1228)  BP: 131/61 (01/10/25 1228)  SpO2: 99 % (01/10/25 1228) Vital Signs (24h Range):  Temp:  [97.4 °F (36.3 °C)-98.8 °F (37.1 °C)] 97.9 °F (36.6 °C)  Pulse:  [] 70  Resp:  [18-19] 19  SpO2:  [98 %-100 %] 99 %  BP: (108-134)/(60-75) 131/61     Weight: 61.2 kg (134 lb 14.7 oz)  Body mass index is 22.45 kg/m².  No intake or output data in the 24 hours ending 01/10/25 1419      Physical Exam  Vitals and nursing note reviewed.   Constitutional:       General: She is sleeping. She is not in acute distress.     Appearance: She is ill-appearing. She is not toxic-appearing.      Interventions: Nasal cannula in place.   HENT:      Head: Normocephalic and atraumatic.   Cardiovascular:      Rate and Rhythm: Normal rate.      Heart sounds: Murmur heard.   Pulmonary:      Effort: Pulmonary effort is normal.      Breath sounds: Decreased air movement present.   Abdominal:      Palpations: Abdomen is soft.      Tenderness: There is no abdominal tenderness.   Genitourinary:     Comments: sullivan  Musculoskeletal:      Right lower leg: Edema present.      Left lower leg: Edema present.   Skin:     General: Skin is warm and dry.   Neurological:      Mental Status: She is easily aroused. She is lethargic.      Motor: Weakness present.             Significant Labs: All pertinent labs within the past 24 hours have been reviewed.  CBC:   Recent Labs   Lab 01/09/25  0052 01/09/25  1433 01/10/25  0247   WBC 5.75 5.37 4.85   HGB 11.9* 11.2* 11.2*   HCT 39.2 36.6* 36.0*    149* 143*     CMP:   Recent Labs   Lab 01/09/25  0052 01/10/25  0518 01/10/25  0802    140 140   K 4.5 6.0* 5.7*    103 103   CO2 26 21* 23   GLU  153* 90 101   BUN 19 28* 30*   CREATININE 1.3 1.4 1.5*   CALCIUM 9.5 9.4 9.5   PROT 7.0  --   --    ALBUMIN 3.5  --   --    BILITOT 1.2*  --   --    ALKPHOS 58  --   --    AST 47*  --   --    ALT 13  --   --    ANIONGAP 13 16 14       Significant Imaging: I have reviewed all pertinent imaging results/findings within the past 24 hours.  CXR: I have reviewed all pertinent results/findings within the past 24 hours and my personal findings are:  stable congestive heart failure   Echo: I have reviewed all pertinent results/findings within the past 24 hours and my personal findings are:  ejection fraction 15-20%

## 2025-01-11 VITALS
RESPIRATION RATE: 18 BRPM | WEIGHT: 134.94 LBS | HEIGHT: 65 IN | OXYGEN SATURATION: 100 % | HEART RATE: 67 BPM | DIASTOLIC BLOOD PRESSURE: 56 MMHG | TEMPERATURE: 98 F | BODY MASS INDEX: 22.48 KG/M2 | SYSTOLIC BLOOD PRESSURE: 112 MMHG

## 2025-01-11 LAB
ANION GAP SERPL CALC-SCNC: 13 MMOL/L (ref 8–16)
APTT PPP: 67.5 SEC (ref 21–32)
BASOPHILS # BLD AUTO: 0.03 K/UL (ref 0–0.2)
BASOPHILS NFR BLD: 0.5 % (ref 0–1.9)
BUN SERPL-MCNC: 31 MG/DL (ref 8–23)
CALCIUM SERPL-MCNC: 9.1 MG/DL (ref 8.7–10.5)
CHLORIDE SERPL-SCNC: 99 MMOL/L (ref 95–110)
CO2 SERPL-SCNC: 29 MMOL/L (ref 23–29)
CREAT SERPL-MCNC: 1.6 MG/DL (ref 0.5–1.4)
DIFFERENTIAL METHOD BLD: ABNORMAL
EOSINOPHIL # BLD AUTO: 0.1 K/UL (ref 0–0.5)
EOSINOPHIL NFR BLD: 0.9 % (ref 0–8)
ERYTHROCYTE [DISTWIDTH] IN BLOOD BY AUTOMATED COUNT: 15 % (ref 11.5–14.5)
EST. GFR  (NO RACE VARIABLE): 34 ML/MIN/1.73 M^2
GLUCOSE SERPL-MCNC: 91 MG/DL (ref 70–110)
HCT VFR BLD AUTO: 37 % (ref 37–48.5)
HGB BLD-MCNC: 11.4 G/DL (ref 12–16)
IMM GRANULOCYTES # BLD AUTO: 0.02 K/UL (ref 0–0.04)
IMM GRANULOCYTES NFR BLD AUTO: 0.4 % (ref 0–0.5)
LYMPHOCYTES # BLD AUTO: 1.6 K/UL (ref 1–4.8)
LYMPHOCYTES NFR BLD: 28.7 % (ref 18–48)
MCH RBC QN AUTO: 29.2 PG (ref 27–31)
MCHC RBC AUTO-ENTMCNC: 30.8 G/DL (ref 32–36)
MCV RBC AUTO: 95 FL (ref 82–98)
MONOCYTES # BLD AUTO: 0.8 K/UL (ref 0.3–1)
MONOCYTES NFR BLD: 13.3 % (ref 4–15)
NEUTROPHILS # BLD AUTO: 3.2 K/UL (ref 1.8–7.7)
NEUTROPHILS NFR BLD: 56.2 % (ref 38–73)
NRBC BLD-RTO: 0 /100 WBC
PLATELET # BLD AUTO: 135 K/UL (ref 150–450)
PMV BLD AUTO: 11.3 FL (ref 9.2–12.9)
POTASSIUM SERPL-SCNC: 4.2 MMOL/L (ref 3.5–5.1)
RBC # BLD AUTO: 3.91 M/UL (ref 4–5.4)
SODIUM SERPL-SCNC: 141 MMOL/L (ref 136–145)
WBC # BLD AUTO: 5.71 K/UL (ref 3.9–12.7)

## 2025-01-11 PROCEDURE — 85025 COMPLETE CBC W/AUTO DIFF WBC: CPT | Performed by: INTERNAL MEDICINE

## 2025-01-11 PROCEDURE — 85730 THROMBOPLASTIN TIME PARTIAL: CPT | Performed by: FAMILY MEDICINE

## 2025-01-11 PROCEDURE — 25000003 PHARM REV CODE 250: Performed by: INTERNAL MEDICINE

## 2025-01-11 PROCEDURE — 80048 BASIC METABOLIC PNL TOTAL CA: CPT | Performed by: NURSE PRACTITIONER

## 2025-01-11 PROCEDURE — 36415 COLL VENOUS BLD VENIPUNCTURE: CPT | Performed by: FAMILY MEDICINE

## 2025-01-11 PROCEDURE — 25000003 PHARM REV CODE 250: Performed by: NURSE PRACTITIONER

## 2025-01-11 PROCEDURE — 99233 SBSQ HOSP IP/OBS HIGH 50: CPT | Mod: ,,, | Performed by: INTERNAL MEDICINE

## 2025-01-11 RX ADMIN — POTASSIUM CHLORIDE 20 MEQ: 1500 TABLET, EXTENDED RELEASE ORAL at 09:01

## 2025-01-11 RX ADMIN — CLOPIDOGREL BISULFATE 75 MG: 75 TABLET ORAL at 09:01

## 2025-01-11 RX ADMIN — PANTOPRAZOLE SODIUM 40 MG: 40 TABLET, DELAYED RELEASE ORAL at 09:01

## 2025-01-11 RX ADMIN — ASPIRIN 81 MG: 81 TABLET, COATED ORAL at 09:01

## 2025-01-11 RX ADMIN — ATORVASTATIN CALCIUM 20 MG: 10 TABLET, FILM COATED ORAL at 09:01

## 2025-01-11 RX ADMIN — ESCITALOPRAM OXALATE 10 MG: 10 TABLET ORAL at 09:01

## 2025-01-11 NOTE — CONSULTS
SW spoke with pt and explained role. Pt consented to sending a home health referral to [facility] . SW sent referral via Epic. They will review the referral and follow up with the pt.

## 2025-01-11 NOTE — PLAN OF CARE
[facility] - Telemetry (Hospital)  Discharge Final Note    Primary Care Provider: Keara Lee FNP    Expected Discharge Date: 1/11/2025    Final Discharge Note (most recent)       Final Note - 01/11/25 1146          Final Note    Assessment Type Final Discharge Note     Anticipated Discharge Disposition Home or Self Care        Post-Acute Status    Discharge Delays None known at this time                     Important Message from Medicare             Contact Info       Keara Lee FNP   Specialty: Family Medicine   Relationship: PCP - General    [address] [place] [address]  [address] [place] [id number]   Phone: [phone]       Next Steps: Schedule an appointment as soon as possible for a visit in 3 day(s)    Instructions: hospital follow up    Brian Young MD   Specialty: Interventional Cardiology, Cardiology    [address]  [place] [address]   Phone: [phone]       Next Steps: Schedule an appointment as soon as possible for a visit in 1 week(s)    Instructions: hospital follow up    Chelsea Duran PA   Specialty: Transplant    [address] [facility] [address]   Phone: [phone]       Next Steps: Schedule an appointment as soon as possible for a visit in 3 day(s)    Instructions: hospital follow up

## 2025-01-11 NOTE — NURSING
AVS virtually reviewed with patient and her cruzito Lepe in its entirety with emphasis on diet, medications, follow-up appointments and reasons to return to the ED or contact the [facility] On Call Nurse Care Line. Patient also encouraged to utilize their patient portal. Ease and convenience of use reiterated. Education complete and patient voiced understanding. All questions answered. Discharge teaching completed.

## 2025-01-11 NOTE — PROGRESS NOTES
[facility] - Tele[facility] ([facility])  Cardiology  Progress Note    Patient Name: Violeta Scherer  MRN: [id number]  Admission Date: 1/9/2025  Hospital Length of Stay: 2 days  Code Status: Full Code   Attending Physician: Calvin Schrader MD   Primary Care Physician: Keara Lee FNP  Expected Discharge Date: 1/11/2025  Principal Problem:NSTEMI (non-ST elevated myocardial infarction)    Subjective:     HPI:  Cardiology consulted for CHF, elevated troponin.  Pt has hx of severe CHF, multivessel CAD, severe MR, CVA, PE, COPD (home O2 dependent).  She was admitted Jan 2023 w NSTEMI, CHF and had LHC (Dr. Brown) and echo done at that time.  Cath reviewed: diffuse CAD noted with no good target for PCI (had 99% distal LCX, 99% LPDA, 70% mid LCX, small diffusely diseased RCA and LAD vessels.  EF 20% on cath w severe MR noted.  Echo Jan 2023 EF 25%, mild-mod AS, mod MR/TR, PAP 61 mmHg.  Pt now admitted with congestion, runny nose, weakness, nausea/vomiting, edema, KO, diarrhea, had nose bleed as well.  Ecg on admit NSR, anterior/inferior infarct noted with Q waves in anterior/inferior leads w ST elevation, LVH.  Dr Brown, on call Cards, deemed old MI.  Med tx advised.  Troponin shaye to 13.  BNP > 4900  She denies angina/CP sxs at time of consult.  She is weak, frail condition.  She seems to have possible dementia -- does not know year, and seems very forgetful.    Also it is made of note, that pt has not been seen by [facility] since 2023 and has had numerous CHF clinic appts that pt was no show.    Hospital Course:   1/10/25-Patient seen and examined today, resting in bed. Family reports she sat up this AM and ate breakfast. Sleepy during exam. SOB seems improved. No reports of CP. Labs reviewed. Creatinine 1.4, K 6.0. Troponin trending down. TTE with EF of 15-20%, DD, mildly reduced RV function, severe MR, pulmonary HTN. Previously could not tolerate WCD.    1/11/25: Pt seen this am. No acute issues noted.  Discussed w  family.  Denies CP.  Dyspnea improved. Got out of bed.  Labs reviewed.  On heparin gtt at time of visit.        Review of Systems   Constitutional: Positive for malaise/fatigue.   HENT: Negative.     Eyes: Negative.    Cardiovascular:  Positive for dyspnea on exertion and leg swelling.   Respiratory:  Positive for shortness of breath.    Endocrine: Negative.    Hematologic/Lymphatic: Negative.    Skin: Negative.    Musculoskeletal:  Positive for arthritis, joint pain, muscle weakness and stiffness.   Gastrointestinal: Negative.    Genitourinary: Negative.    Neurological:  Positive for difficulty with concentration and weakness.   Psychiatric/Behavioral:  Positive for memory loss.    Allergic/Immunologic: Negative.    Objective:     Vital Signs (Most Recent):  Temp: 97.8 °F (36.6 °C) (01/11/25 0820)  Pulse: 67 (01/11/25 0957)  Resp: 18 (01/11/25 0820)  BP: (!) 112/56 (01/11/25 0820)  SpO2: 100 % (01/11/25 0820) Vital Signs (24h Range):  Temp:  [97.5 °F (36.4 °C)-98.8 °F (37.1 °C)] 97.8 °F (36.6 °C)  Pulse:  [67-73] 67  Resp:  [18-20] 18  SpO2:  [98 %-100 %] 100 %  BP: ()/(56-63) 112/56     Weight: 61.2 kg (134 lb 14.7 oz)  Body mass index is 22.45 kg/m².     SpO2: 100 %         Intake/Output Summary (Last 24 hours) at 1/11/2025 1347  Last data filed at 1/11/2025 1230  Gross per 24 hour   Intake 720 ml   Output 550 ml   Net 170 ml       Lines/Drains/Airways       Drain  Duration             Female External Urinary Catheter w/ Suction 01/10/25 0800 1 day                       Physical Exam  Vitals and nursing note reviewed.   Constitutional:       General: She is not in acute distress.     Appearance: Normal appearance. She is well-developed and underweight. She is ill-appearing. She is not diaphoretic.   HENT:      Head: Normocephalic.   Neck:      Thyroid: No thyromegaly.      Vascular: No carotid bruit or JVD.   Cardiovascular:      Rate and Rhythm: Normal rate and regular rhythm.      Pulses: Normal pulses.   "         Radial pulses are 2+ on the right side and 2+ on the left side.      Heart sounds: S1 normal and S2 normal. Murmur heard.      Systolic murmur is present.      No friction rub. No gallop.   Pulmonary:      Effort: Pulmonary effort is normal.      Breath sounds: Examination of the right-lower field reveals decreased breath sounds. Examination of the left-lower field reveals decreased breath sounds. Decreased breath sounds present. No wheezing or rales.   Abdominal:      General: Bowel sounds are normal. There is no abdominal bruit.      Palpations: Abdomen is soft.      Tenderness: There is no abdominal tenderness.   Musculoskeletal:      Cervical back: Neck supple.   Lymphadenopathy:      Cervical: No cervical adenopathy.   Skin:     General: Skin is dry.   Neurological:      Mental Status: She is alert.   Psychiatric:         Behavior: Behavior normal. Behavior is cooperative.        Significant Labs: ABG: No results for input(s): "PH", "PCO2", "HCO3", "POCSATURATED", "BE" in the last 48 hours., Blood Culture: No results for input(s): "LABBLOO" in the last 48 hours., BMP:   Recent Labs   Lab 01/10/25  0518 01/10/25  0802 01/11/25  0554   GLU 90 101 91    140 141   K 6.0* 5.7* 4.2    103 99   CO2 21* 23 29   BUN 28* 30* 31*   CREATININE 1.4 1.5* 1.6*   CALCIUM 9.4 9.5 9.1   , CMP   Recent Labs   Lab 01/10/25  0518 01/10/25  0802 01/11/25  0554    140 141   K 6.0* 5.7* 4.2    103 99   CO2 21* 23 29   GLU 90 101 91   BUN 28* 30* 31*   CREATININE 1.4 1.5* 1.6*   CALCIUM 9.4 9.5 9.1   ANIONGAP 16 14 13   , CBC   Recent Labs   Lab 01/09/25  1433 01/10/25  0247 01/11/25  0554   WBC 5.37 4.85 5.71   HGB 11.2* 11.2* 11.4*   HCT 36.6* 36.0* 37.0   * 143* 135*   , INR   Recent Labs   Lab 01/09/25  1402   INR 1.2   , Lipid Panel No results for input(s): "CHOL", "HDL", "LDLCALC", "TRIG", "CHOLHDL" in the last 48 hours., and Troponin No results for input(s): "TROPONINIHS" in the last 48 " hours.    Significant Imaging: Echocardiogram: Transthoracic echo (TTE) complete (Cupid Only):   Results for orders placed or performed during the hospital encounter of 01/09/25   Echo   Result Value Ref Range    BSA 1.68 m2    LA WIDTH 5.1 cm    RA Width 5.1 cm    LVOT stroke volume 26.9 cm3    LVIDd 5.3 3.5 - 6.0 cm    LV Systolic Volume 112.85 mL    LV Systolic Volume Index 67.6 mL/m2    LVIDs 4.9 (A) 2.1 - 4.0 cm    LV Diastolic Volume 134.31 mL    LV Diastolic Volume Index 80.43 mL/m2    Left Ventricular End Systolic Volume by Teichholz Method 112.85 mL    Left Ventricular End Diastolic Volume by Teichholz Method 134.31 mL    IVS 0.9 0.6 - 1.1 cm    LVOT diameter 1.9 cm    LVOT area 2.8 cm2    FS 7.5 (A) 28 - 44 %    Left Ventricle Relative Wall Thickness 0.30 cm    PW 0.8 0.6 - 1.1 cm    LV mass 162.1 g    LV Mass Index 97.1 g/m2    MV Peak E Sarmad 1.27 m/s    TDI LATERAL 0.06 m/s    TDI SEPTAL 0.04 m/s    E/E' ratio 25.40 m/s    MV Peak A Sarmad 0.98 m/s    TR Max Sarmad 3.83 m/s    E/A ratio 1.30     IVRT 79.92 msec    E wave deceleration time 162.82 msec    LV SEPTAL E/E' RATIO 31.75 m/s    ALISHA 54.0 mL/m2    LV LATERAL E/E' RATIO 21.17 m/s    LA Vol 90.11 cm3    PV Peak S Sarmad 0.65 m/s    PV Peak D Sarmad 0.29 m/s    Pulm vein S/D ratio 2.24     LVOT peak sarmad 0.5 m/s    Left Ventricular Outflow Tract Mean Velocity 0.40 cm/s    Left Ventricular Outflow Tract Mean Gradient 0.68 mmHg    RVOT peak VTI 8.6 cm    TAPSE 2.16 cm    LA size 3.95 cm    Left Atrium Minor Axis 5.07 cm    Left Atrium Major Axis 5.47 cm    RA Major Axis 5.46 cm    AV mean gradient 9.0 mmHg    AV peak gradient 11.6 mmHg    Ao peak sarmad 1.7 m/s    Ao VTI 35.7 cm    LVOT peak VTI 9.5 cm    AV valve area 0.8 cm²    AV Velocity Ratio 0.29     AV index (prosthetic) 0.27     KRISTIE by Velocity Ratio 0.8 cm²    Mr max sarmad 4.58 m/s    Triscuspid Valve Regurgitation Peak Gradient 59 mmHg    PV mean gradient 1 mmHg    RVOT peak sarmad 0.45 m/s    Ao root annulus  3.12 cm    STJ 3.00 cm    Ascending aorta 3.09 cm    IVC diameter 1.84 cm    Mean e' 0.05 m/s    ZLVIDS 4.17     ZLVIDD 1.27     TV resting pulmonary artery pressure 67 mmHg    RV TB RVSP 12 mmHg    Est. RA pres 8 mmHg    Narrative      Left Ventricle: The left ventricle is mildly dilated. Regional wall   motion abnormalities present. There is severely reduced systolic function   with a visually estimated ejection fraction of 15 - 20%. There is   diastolic dysfunction.    Right Ventricle: Normal right ventricular cavity size. Systolic   function is mildly reduced.    Left Atrium: Left atrium is severely dilated.    Right Atrium: Right atrium is dilated.    Aortic Valve: There is mild aortic valve sclerosis.    Mitral Valve: There is moderate bileaflet sclerosis. There is mild   posterior mitral annular calcification present. There is severe   regurgitation with an eccentric jet.    Tricuspid Valve: There is moderate regurgitation.    Pulmonary Artery: There is severe pulmonary hypertension. The estimated   pulmonary artery systolic pressure is 67 mmHg.    IVC/SVC: Intermediate venous pressure at 8 mmHg.       Assessment and Plan:     S/P NSTEMI.  ACUTE CHF.  CV STATUS STABILIZING.  CAN STOP IV HEPARIN GT.  DAPT.  CAN STOP IV LASIX GTT.  WILL NEED PO DIURETIC.  GDMT ADVISED IN FUTURE FOR CHF/CAD.  POOR CANDIDATE FOR PCI DUE TO DEBILITATED STATE/COMORBIDITIES.  IMPROVE COMPLIANCE WITH F/U CARDIOLOGY APPTS.  F/U CHF CLINIC ASAP AFTER DISCHARGE.    * NSTEMI (non-ST elevated myocardial infarction)  Atypical presentation for NSTEMI.  No typical CP/angina.  Seems to be secondary NSTEMI perhaps due to being sick recently maybe with cold/viral or other issues.  ECG on admit with inferior/anterior infarct w Q waves noted.    Pt is frail/weak; likely best treated with continued medical tx.    IV heparin gtt x 48 hours as tolerated.  DAPT.  Statin tx.  Echocardiogram.    1/10/25  -Stable overnight, improved  -Troponin trending  down, no complaints of CP  -Continue DAPT, BB, heparin gtt x 48 hours  -TTE with EF of 15-20%, mildly reduced RV function, severe MR, pulmonary HTN  -Continue Lasix gtt for additional day      Nonrheumatic mitral valve regurgitation  Severe MR on 2023 cath and likely ischemic MR.  Echo to reevaluate.    1/10/25  -TTE with severe MR    Pulmonary hypertension  Echo reevaluation.    Abnormal ECG  Inferior/anterior infarct noted.  See CAD/NSTEMI section.    CAD, multiple vessel  [facility] 2023 diffuse multivessel CAD; med mgt advised.    OMT advised for CAD.    Acute on chronic combined systolic and diastolic heart failure  IV Lasix gtt.  Low salt diet.  GDMT advised for severe CHF as tolerated.  Echocardiogram.      1/10/25  -TTE with EF of 15-20%, mildly reduced RV function, severe MR, pulmonary HTN  -Looks better this AM  -Continue Lasix drip  -Continue BB  -Consider Entresto if BP/renal function permits  -Previously did not tolerate WCD    Hypertension  -Titrate medications    Acute exacerbation of CHF (congestive heart failure)  -TTE with EF of 15-20%, mildly reduced RV function, severe MR, pulmonary HTN  -Looks better this AM  -Continue Lasix drip  -Continue BB  -Consider Entresto if BP/renal function permits  -Previously did not tolerate WCD    HLD (hyperlipidemia)  -Statin        VTE Risk Mitigation (From admission, onward)           Ordered     IP VTE HIGH RISK PATIENT  Once         01/09/25 0234     Place sequential compression device  Until discontinued         01/09/25 0234                    Brian Young MD  Cardiology  [facility] - Telemetry ([facility])

## 2025-01-11 NOTE — SUBJECTIVE & OBJECTIVE
Review of Systems   Constitutional: Positive for malaise/fatigue.   HENT: Negative.     Eyes: Negative.    Cardiovascular:  Positive for dyspnea on exertion and leg swelling.   Respiratory:  Positive for shortness of breath.    Endocrine: Negative.    Hematologic/Lymphatic: Negative.    Skin: Negative.    Musculoskeletal:  Positive for arthritis, joint pain, muscle weakness and stiffness.   Gastrointestinal: Negative.    Genitourinary: Negative.    Neurological:  Positive for difficulty with concentration and weakness.   Psychiatric/Behavioral:  Positive for memory loss.    Allergic/Immunologic: Negative.    Objective:     Vital Signs (Most Recent):  Temp: 97.8 °F (36.6 °C) (01/11/25 0820)  Pulse: 67 (01/11/25 0957)  Resp: 18 (01/11/25 0820)  BP: (!) 112/56 (01/11/25 0820)  SpO2: 100 % (01/11/25 0820) Vital Signs (24h Range):  Temp:  [97.5 °F (36.4 °C)-98.8 °F (37.1 °C)] 97.8 °F (36.6 °C)  Pulse:  [67-73] 67  Resp:  [18-20] 18  SpO2:  [98 %-100 %] 100 %  BP: ()/(56-63) 112/56     Weight: 61.2 kg (134 lb 14.7 oz)  Body mass index is 22.45 kg/m².     SpO2: 100 %         Intake/Output Summary (Last 24 hours) at 1/11/2025 1347  Last data filed at 1/11/2025 1230  Gross per 24 hour   Intake 720 ml   Output 550 ml   Net 170 ml       Lines/Drains/Airways       Drain  Duration             Female External Urinary Catheter w/ Suction 01/10/25 0800 1 day                       Physical Exam  Vitals and nursing note reviewed.   Constitutional:       General: She is not in acute distress.     Appearance: Normal appearance. She is well-developed and underweight. She is ill-appearing. She is not diaphoretic.   HENT:      Head: Normocephalic.   Neck:      Thyroid: No thyromegaly.      Vascular: No carotid bruit or JVD.   Cardiovascular:      Rate and Rhythm: Normal rate and regular rhythm.      Pulses: Normal pulses.           Radial pulses are 2+ on the right side and 2+ on the left side.      Heart sounds: S1 normal and  "S2 normal. Murmur heard.      Systolic murmur is present.      No friction rub. No gallop.   Pulmonary:      Effort: Pulmonary effort is normal.      Breath sounds: Examination of the right-lower field reveals decreased breath sounds. Examination of the left-lower field reveals decreased breath sounds. Decreased breath sounds present. No wheezing or rales.   Abdominal:      General: Bowel sounds are normal. There is no abdominal bruit.      Palpations: Abdomen is soft.      Tenderness: There is no abdominal tenderness.   Musculoskeletal:      Cervical back: Neck supple.   Lymphadenopathy:      Cervical: No cervical adenopathy.   Skin:     General: Skin is dry.   Neurological:      Mental Status: She is alert.   Psychiatric:         Behavior: Behavior normal. Behavior is cooperative.        Significant Labs: ABG: No results for input(s): "PH", "PCO2", "HCO3", "POCSATURATED", "BE" in the last 48 hours., Blood Culture: No results for input(s): "LABBLOO" in the last 48 hours., BMP:   Recent Labs   Lab 01/10/25  0518 01/10/25  0802 01/11/25  0554   GLU 90 101 91    140 141   K 6.0* 5.7* 4.2    103 99   CO2 21* 23 29   BUN 28* 30* 31*   CREATININE 1.4 1.5* 1.6*   CALCIUM 9.4 9.5 9.1   , CMP   Recent Labs   Lab 01/10/25  0518 01/10/25  0802 01/11/25  0554    140 141   K 6.0* 5.7* 4.2    103 99   CO2 21* 23 29   GLU 90 101 91   BUN 28* 30* 31*   CREATININE 1.4 1.5* 1.6*   CALCIUM 9.4 9.5 9.1   ANIONGAP 16 14 13   , CBC   Recent Labs   Lab 01/09/25  1433 01/10/25  0247 01/11/25  0554   WBC 5.37 4.85 5.71   HGB 11.2* 11.2* 11.4*   HCT 36.6* 36.0* 37.0   * 143* 135*   , INR   Recent Labs   Lab 01/09/25  1402   INR 1.2   , Lipid Panel No results for input(s): "CHOL", "HDL", "LDLCALC", "TRIG", "CHOLHDL" in the last 48 hours., and Troponin No results for input(s): "TROPONINIHS" in the last 48 hours.    Significant Imaging: Echocardiogram: Transthoracic echo (TTE) complete (Cupid Only):   Results for " orders placed or performed during the hospital encounter of 01/09/25   Echo   Result Value Ref Range    BSA 1.68 m2    LA WIDTH 5.1 cm    RA Width 5.1 cm    LVOT stroke volume 26.9 cm3    LVIDd 5.3 3.5 - 6.0 cm    LV Systolic Volume 112.85 mL    LV Systolic Volume Index 67.6 mL/m2    LVIDs 4.9 (A) 2.1 - 4.0 cm    LV Diastolic Volume 134.31 mL    LV Diastolic Volume Index 80.43 mL/m2    Left Ventricular End Systolic Volume by Teichholz Method 112.85 mL    Left Ventricular End Diastolic Volume by Teichholz Method 134.31 mL    IVS 0.9 0.6 - 1.1 cm    LVOT diameter 1.9 cm    LVOT area 2.8 cm2    FS 7.5 (A) 28 - 44 %    Left Ventricle Relative Wall Thickness 0.30 cm    PW 0.8 0.6 - 1.1 cm    LV mass 162.1 g    LV Mass Index 97.1 g/m2    MV Peak E Sarmad 1.27 m/s    TDI LATERAL 0.06 m/s    TDI SEPTAL 0.04 m/s    E/E' ratio 25.40 m/s    MV Peak A Sarmad 0.98 m/s    TR Max Sarmad 3.83 m/s    E/A ratio 1.30     IVRT 79.92 msec    E wave deceleration time 162.82 msec    LV SEPTAL E/E' RATIO 31.75 m/s    ALISHA 54.0 mL/m2    LV LATERAL E/E' RATIO 21.17 m/s    LA Vol 90.11 cm3    PV Peak S Sarmad 0.65 m/s    PV Peak D Sarmad 0.29 m/s    Pulm vein S/D ratio 2.24     LVOT peak sarmad 0.5 m/s    Left Ventricular Outflow Tract Mean Velocity 0.40 cm/s    Left Ventricular Outflow Tract Mean Gradient 0.68 mmHg    RVOT peak VTI 8.6 cm    TAPSE 2.16 cm    LA size 3.95 cm    Left Atrium Minor Axis 5.07 cm    Left Atrium Major Axis 5.47 cm    RA Major Axis 5.46 cm    AV mean gradient 9.0 mmHg    AV peak gradient 11.6 mmHg    Ao peak sarmad 1.7 m/s    Ao VTI 35.7 cm    LVOT peak VTI 9.5 cm    AV valve area 0.8 cm²    AV Velocity Ratio 0.29     AV index (prosthetic) 0.27     KRISTIE by Velocity Ratio 0.8 cm²    Mr max sarmad 4.58 m/s    Triscuspid Valve Regurgitation Peak Gradient 59 mmHg    PV mean gradient 1 mmHg    RVOT peak sarmad 0.45 m/s    Ao root annulus 3.12 cm    STJ 3.00 cm    Ascending aorta 3.09 cm    IVC diameter 1.84 cm    Mean e' 0.05 m/s    ZLVIDS 4.17      ZLVIDD 1.27     TV resting pulmonary artery pressure 67 mmHg    RV TB RVSP 12 mmHg    Est. RA pres 8 mmHg    Narrative      Left Ventricle: The left ventricle is mildly dilated. Regional wall   motion abnormalities present. There is severely reduced systolic function   with a visually estimated ejection fraction of 15 - 20%. There is   diastolic dysfunction.    Right Ventricle: Normal right ventricular cavity size. Systolic   function is mildly reduced.    Left Atrium: Left atrium is severely dilated.    Right Atrium: Right atrium is dilated.    Aortic Valve: There is mild aortic valve sclerosis.    Mitral Valve: There is moderate bileaflet sclerosis. There is mild   posterior mitral annular calcification present. There is severe   regurgitation with an eccentric jet.    Tricuspid Valve: There is moderate regurgitation.    Pulmonary Artery: There is severe pulmonary hypertension. The estimated   pulmonary artery systolic pressure is 67 mmHg.    IVC/SVC: Intermediate venous pressure at 8 mmHg.

## 2025-01-11 NOTE — NURSING
Discharge instructions received and reviewed with pt and family at bedside with tali nurse, Alaina.  Pt voiced understanding and all questions answered to satisfaction.  Stressed importance to making and keeping all follow up appointments.  Medications sent to pt pharmacy and reviewed with pt.  Tele monitor removed and brought to monitor tech.  IV d/c'd with tip intact, pressure dressing applied.  Pt transported to front of hospital via w/c by PCT to be discharged home.

## 2025-01-11 NOTE — DISCHARGE INSTRUCTIONS
A nurse practitioner may be contacting you to assess your status post-hospitalization.     Homehealth with physical/occupational therapy order has been ordered. Someone will be in contact.      A referral has been placed on your behalf for pulmonology and congestive heart failure clinic.

## 2025-01-11 NOTE — DISCHARGE SUMMARY
[facility] - Tele[facility] ([facility])  [facility]  Discharge Summary      Patient Name: Violeta Scherer  MRN: [id number]  HOWARD: [id number]  Patient Class: IP- Inpatient  Admission Date: 1/9/2025  Hospital Length of Stay: 2 days  Discharge Date and Time:  01/11/2025 11:57 AM  Attending Physician: Calvin Schrader MD   Discharging Provider: Calvin Schrader MD  Primary Care Provider: Keara Lee FNP    Primary Care Team: [facility] MEDICINE D    HPI:   Violeta Scherer is a 74 y.o. female with a PMH  has a past medical history of Hypertension, Other pulmonary embolism without acute cor pulmonale, and Stroke.presented to the Emergency Department for evaluation of N/V/D, general weakness and bilateral leg edema which onset gradually 1 day ago. Pt's granddaughter noticed pt has been weak and requiring more assistance for the past day. She also reports rhinorrhea and congestion which onset about a week ago but worsened yesterday. Pt's granddaughter reports epistaxis onset after blowing nose about 20 minutes PTA. Symptoms are constant and moderate in severity. No mitigating or exacerbating factors reported. Associated sxs include leg swelling and SOB. Patient denies any chest pain, abdominal pain, myalgias, headache, fever, and all other sxs at this time. No prior Tx reported. Pt uses O2 between 2-4L/min via NC at home. No further complaints or concerns at this time.     ER workup revealed CBC to be unremarkable.  CMP revealed  mg/dL.  BNP>4,900.  Troponin 3.045.  Vitals stating.  325 mg aspirin given.  Chest x-ray cardiomegaly with perihilar interstitial opacities with CHF.  EKG was sinus rhythm with ventricular rate 81 beats per minute and a QT/QTC of 396/460.  Cardiology consulted.  We will see in a.m. as consult.  Recommend Hospital Medicine admit for NSTEMI.  Patient in agreement with treatment plan.  Patient admitted under inpatient status.        PCP: Keara Lee      * No surgery found *      Hospital Course:   1/10  admitted for congestive heart failure exacerbation and elevated troponin(s). Cardiology consulted and recommended heparin x 48h and continue lasix infusion. Did not tolerate wearing lifevest, caused anxiety. Sleep disturbance overnight.    1/11  Heparin infused x 48h due to elevated troponin(s). Troponin(s) peaked and trending down. Diffuse disease with medical management advised due to poor functional status/respiratory status. Intolerant to wearing lifevest. On baseline oxygen requirements.    No acute distress. No respiratory distress on baseline oxygen requirements. Sleeping but easily awakens. Lower extremity edema. Frail and ill appearing    Patient seen and evaluated by me. Patient was determined to be suitable for discharge. Patient deemed stable for discharge to home with homehealth physical/occupational therapy and nurse practitioner to visit home program. Referrals placed for congestive heart failure clinic and copd clinic. Patient would benefit from referral to palliative, but family unrealistic at this time.       Goals of Care Treatment Preferences:  Code Status: Full Code      SDOH Screening:  The patient was screened for utility difficulties, food insecurity, transport difficulties, housing insecurity, and interpersonal safety and there were no concerns identified this admission.     Consults:   Consults (From admission, onward)          Status Ordering Provider     Inpatient consult to Social Work/Case Management  Once        Provider:  (Not yet assigned)    Ordered JANINE LANDEROS     Inpatient consult to Cardiology  Once        Provider:  Brian Young MD    Completed SUZAN WEST     Inpatient consult to Social Work/Case Management  Once        Provider:  (Not yet assigned)    Completed SUZAN WEST     Inpatient consult to Registered Dietitian/Nutritionist  Once        Provider:  (Not yet assigned)    Completed SUZAN WEST     Inpatient consult to Hospitalist  Once         Provider:  Alex Arnold MD    Acknowledged AVERY LOREDO.            * NSTEMI (non-ST elevated myocardial infarction)  Cardiology consulted  Recommending heparin x 48h      BENI (acute kidney injury)  BENI is likely due to acute tubular necrosis caused by hypotension and diuretic . Baseline creatinine is  1 . Most recent creatinine and eGFR are listed below.  Recent Labs     01/09/25  0052 01/10/25  0518 01/10/25  0802   CREATININE 1.3 1.4 1.5*   EGFRNORACEVR 43* 40* 37*      Plan  - BENI is worsening. Will continue current treatment  - Avoid nephrotoxins and renally dose meds for GFR listed above  - Monitor urine output, serial BMP, and adjust therapy as needed  - not a dialysis candidate  Concerns for cardiorenal syndrome  Lactate pending    History of COPD  Patient's COPD is with exacerbation noted by continued dyspnea and worsening of baseline hypoxia currently likely secondary to CHF exacerbation.  Patient is currently off COPD Pathway. Continue scheduled inhalers  duonebs and Supplemental oxygen and monitor respiratory status closely.         Carotid stenosis, bilateral  Compliant with Plavix.  Denies any new or worsening symptoms.    Plan:   -continue Plavix      Hypertension  Chronic, controlled.  Latest blood pressure and vitals reviewed-   Temp:  [97.4 °F (36.3 °C)-98.8 °F (37.1 °C)]   Pulse:  []   Resp:  [18-19]   BP: (108-134)/(60-75)   SpO2:  [98 %-100 %] .   Home meds for hypertension were reviewed and noted below.   Hypertension Medications               furosemide (LASIX) 20 MG tablet Take 1 tablet (20 mg total) by mouth 2 (two) times a day.    losartan (COZAAR) 25 MG tablet Take 1 tablet (25 mg total) by mouth once daily.    metoprolol succinate (TOPROL-XL) 25 MG 24 hr tablet Take 0.5 tablets (12.5 mg total) by mouth every evening.            While in the hospital, will manage blood pressure as follows; Continue home antihypertensive regimen    Will utilize p.r.n. blood pressure  medication only if patient's blood pressure greater than  160/90 and she develops symptoms such as worsening chest pain or shortness of breath.      Acute exacerbation of CHF (congestive heart failure)  Patient has  unspecified  heart failure that is Acute on chronic. On presentation their CHF was decompensated. Evidence of decompensated CHF on presentation includes: edema, orthopnea, paroxysmal nocturnal dyspnea (PND), dyspnea on exertion (KO), and shortness of breath. The etiology of their decompensation is likely dietary indiscretion. Most recent BNP and echo results are listed below.  Recent Labs     01/09/25  0052   BNP >4,900*       Latest ECHO  Results for orders placed during the hospital encounter of 01/19/23    Echo    Interpretation Summary  · The left ventricle is moderately enlarged with concentric hypertrophy and severely decreased systolic function.  · The estimated ejection fraction is 25%.  · Grade II left ventricular diastolic dysfunction.  · There is severe left ventricular global hypokinesis.  · Normal right ventricular size with normal right ventricular systolic function.  · There is mild-to-moderate aortic valve stenosis.  · Aortic valve area is 0.88 cm2; peak velocity is 2.49 m/s; mean gradient is 17 mmHg.  · Moderate mitral regurgitation.  · Moderate tricuspid regurgitation.  · Elevated central venous pressure (15 mmHg).  · The estimated PA systolic pressure is 61 mmHg.  · There is pulmonary hypertension.  · Trivial circumferential pericardial effusion. Effusion is fluid.    Current Heart Failure Medications  metoprolol succinate (TOPROL-XL) 24 hr split tablet 12.5 mg, Nightly, Oral  furosemide (Lasix) 200 mg in 0.9% NaCl SolP 100 mL continuous infusion (conc: 2 mg/mL), Continuous, Intravenous  , Daily, Oral    Plan  - Monitor strict I&Os and daily weights.    - Place on telemetry  - Low sodium diet  - Place on fluid restriction of 2 L.   - Cardiology has been consulted  - The patient's  volume status is improving but not at their baseline as indicated by edema, orthopnea, paroxysmal nocturnal dyspnea (PND), dyspnea on exertion (KO), and shortness of breath    Lasix infusion    GERD (gastroesophageal reflux disease)  Chronic. Stable. Currently asymptomatic. Home medications include PPI/Antacids as needed.  Plan:  -Continue PPI/Antacids as needed         HLD (hyperlipidemia)  Patient is chronically on statin.will continue for now. Last Lipid Panel:   Lab Results   Component Value Date    CHOL 166 10/01/2024    HDL 42 (L) 10/01/2024    LDLCALC 112 10/01/2024    TRIG 59 10/01/2024    CHOLHDL 34.1 01/20/2023     Plan:  -Continue home medication  -low fat/low calorie diet          Final Active Diagnoses:    Diagnosis Date Noted POA    PRINCIPAL PROBLEM:  NSTEMI (non-ST elevated myocardial infarction) [I21.4] 01/20/2023 Yes    BENI (acute kidney injury) [N17.9] 01/10/2025 No    Acute exacerbation of CHF (congestive heart failure) [I50.9] 01/09/2025 Yes    Hypertension [I10] 01/09/2025 Unknown    History of COPD [Z87.09] 01/09/2025 Not Applicable    Acute on chronic combined systolic and diastolic heart failure [I50.43] 01/09/2025 Unknown    CAD, multiple vessel [I25.10] 01/09/2025 Yes    Abnormal ECG [R94.31] 01/09/2025 Unknown    Pulmonary hypertension [I27.20] 01/09/2025 Yes    Nonrheumatic mitral valve regurgitation [I34.0] 01/09/2025 Yes    GERD (gastroesophageal reflux disease) [K21.9] 01/20/2023 Yes    HLD (hyperlipidemia) [E78.5] 01/20/2023 Yes    Carotid stenosis, bilateral [I65.23] 10/04/2017 Yes      Problems Resolved During this Admission:    Diagnosis Date Noted Date Resolved POA    Carotid stenosis [I65.29] 01/09/2025 01/09/2025 Unknown       Discharged Condition: stable    Disposition: Home or Self Care    Follow Up:   Follow-up Information       Keara Lee FNP. Schedule an appointment as soon as possible for a visit in 3 day(s).    Specialty: Family Medicine  Why: hospital follow  up  Contact information:  [address]  [address]  [facility] [address]  [phone]               Brian Young MD. Schedule an appointment as soon as possible for a visit in 1 week(s).    Specialties: Interventional Cardiology, Cardiology  Why: hospital follow up  Contact information:  [address]  [place] [address]  [phone]               Chelsea Duran PA. Schedule an appointment as soon as possible for a visit in 3 day(s).    Specialty: Transplant  Why: hospital follow up  Contact information:  [address] [facility] [address]  [phone]               Barak Simmons PA-C. Schedule an appointment as soon as possible for a visit in 1 week(s).    Specialty: Pulmonary Disease  Why: hospital follow up  Contact information:  [address]  [facility] [address]  [phone]                           Patient Instructions:      Ambulatory referral/consult to Congestive Heart Failure Clinic   Standing Status: Future   Referral Priority: Routine Referral Type: Consultation   Referral Reason: Specialty Services Required   Requested Specialty: Cardiology   Number of Visits Requested: 1     Ambulatory referral/consult to [facility]     Ambulatory referral/consult to Pulmonology   Standing Status: Future   Referral Priority: Routine Referral Type: Consultation   Referral Reason: Specialty Services Required   Referred to Provider: BARAK SIMMONS Requested Specialty: Pulmonary Disease   Number of Visits Requested: 1     Ambulatory referral/consult to Home Health   Standing Status: Future   Referral Priority: Routine Referral Type: Home Health   Referral Reason: Specialty Services Required   Requested Specialty: Home Health Services   Number of Visits Requested: 1     Diet Cardiac       Significant Diagnostic Studies: Labs: CMP   Recent Labs   Lab 01/10/25  0518 01/10/25  0802 01/11/25  0554    140 141   K 6.0* 5.7* 4.2    103 99   CO2 21*  23 29   GLU 90 101 91   BUN 28* 30* 31*   CREATININE 1.4 1.5* 1.6*   CALCIUM 9.4 9.5 9.1   ANIONGAP 16 14 13   , CBC   Recent Labs   Lab 01/09/25  1433 01/10/25  0247 01/11/25  0554   WBC 5.37 4.85 5.71   HGB 11.2* 11.2* 11.4*   HCT 36.6* 36.0* 37.0   * 143* 135*   , Troponin   Recent Labs   Lab 01/09/25  0300 01/09/25  0650 01/10/25  0804   TROPONINI 6.258*  6.258* 13.552* 7.958*   , All labs within the past 24 hours have been reviewed, and bnp >4,900  Microbiology: flu negative   Radiology: X-Ray: CXR: cardiomegaly and stable congestive heart failure   Cardiac Graphics: Echocardiogram: Transthoracic echo (TTE) complete (Cupid Only):   Results for orders placed or performed during the hospital encounter of 01/09/25   Echo   Result Value Ref Range    BSA 1.68 m2    LA WIDTH 5.1 cm    RA Width 5.1 cm    LVOT stroke volume 26.9 cm3    LVIDd 5.3 3.5 - 6.0 cm    LV Systolic Volume 112.85 mL    LV Systolic Volume Index 67.6 mL/m2    LVIDs 4.9 (A) 2.1 - 4.0 cm    LV Diastolic Volume 134.31 mL    LV Diastolic Volume Index 80.43 mL/m2    Left Ventricular End Systolic Volume by Teichholz Method 112.85 mL    Left Ventricular End Diastolic Volume by Teichholz Method 134.31 mL    IVS 0.9 0.6 - 1.1 cm    LVOT diameter 1.9 cm    LVOT area 2.8 cm2    FS 7.5 (A) 28 - 44 %    Left Ventricle Relative Wall Thickness 0.30 cm    PW 0.8 0.6 - 1.1 cm    LV mass 162.1 g    LV Mass Index 97.1 g/m2    MV Peak E Sarmad 1.27 m/s    TDI LATERAL 0.06 m/s    TDI SEPTAL 0.04 m/s    E/E' ratio 25.40 m/s    MV Peak A Sarmad 0.98 m/s    TR Max Sarmad 3.83 m/s    E/A ratio 1.30     IVRT 79.92 msec    E wave deceleration time 162.82 msec    LV SEPTAL E/E' RATIO 31.75 m/s    ALISHA 54.0 mL/m2    LV LATERAL E/E' RATIO 21.17 m/s    LA Vol 90.11 cm3    PV Peak S Sarmad 0.65 m/s    PV Peak D Sarmad 0.29 m/s    Pulm vein S/D ratio 2.24     LVOT peak sarmad 0.5 m/s    Left Ventricular Outflow Tract Mean Velocity 0.40 cm/s    Left Ventricular Outflow Tract Mean Gradient  0.68 mmHg    RVOT peak VTI 8.6 cm    TAPSE 2.16 cm    LA size 3.95 cm    Left Atrium Minor Axis 5.07 cm    Left Atrium Major Axis 5.47 cm    RA Major Axis 5.46 cm    AV mean gradient 9.0 mmHg    AV peak gradient 11.6 mmHg    Ao peak aleksandr 1.7 m/s    Ao VTI 35.7 cm    LVOT peak VTI 9.5 cm    AV valve area 0.8 cm²    AV Velocity Ratio 0.29     AV index (prosthetic) 0.27     KRISTIE by Velocity Ratio 0.8 cm²    Mr max aleksandr 4.58 m/s    Triscuspid Valve Regurgitation Peak Gradient 59 mmHg    PV mean gradient 1 mmHg    RVOT peak aleksandr 0.45 m/s    Ao root annulus 3.12 cm    STJ 3.00 cm    Ascending aorta 3.09 cm    IVC diameter 1.84 cm    Mean e' 0.05 m/s    ZLVIDS 4.17     ZLVIDD 1.27     TV resting pulmonary artery pressure 67 mmHg    RV TB RVSP 12 mmHg    Est. RA pres 8 mmHg    Narrative      Left Ventricle: The left ventricle is mildly dilated. Regional wall   motion abnormalities present. There is severely reduced systolic function   with a visually estimated ejection fraction of 15 - 20%. There is   diastolic dysfunction.    Right Ventricle: Normal right ventricular cavity size. Systolic   function is mildly reduced.    Left Atrium: Left atrium is severely dilated.    Right Atrium: Right atrium is dilated.    Aortic Valve: There is mild aortic valve sclerosis.    Mitral Valve: There is moderate bileaflet sclerosis. There is mild   posterior mitral annular calcification present. There is severe   regurgitation with an eccentric jet.    Tricuspid Valve: There is moderate regurgitation.    Pulmonary Artery: There is severe pulmonary hypertension. The estimated   pulmonary artery systolic pressure is 67 mmHg.    IVC/SVC: Intermediate venous pressure at 8 mmHg.         Pending Diagnostic Studies:       None           Medications:  Reconciled Home Medications:      Medication List        CONTINUE taking these medications      albuterol 90 mcg/actuation inhaler  Commonly known as: PROVENTIL/VENTOLIN HFA  Inhale into the lungs daily  as needed.     aspirin 81 MG EC tablet  Commonly known as: ECOTRIN  Take 1 tablet (81 mg total) by mouth once daily.     atorvastatin 20 MG tablet  Commonly known as: LIPITOR  Take 20 mg by mouth once daily.     docusate sodium 100 MG capsule  Commonly known as: COLACE  Take 1 capsule (100 mg total) by mouth 2 (two) times daily as needed for Constipation.     EScitalopram oxalate 10 MG tablet  Commonly known as: LEXAPRO  Take 10 mg by mouth once daily.     fluticasone-umeclidin-vilanter 100-62.5-25 mcg Dsdv  Commonly known as: TRELEGY ELLIPTA  Inhale 1 puff into the lungs once daily.     furosemide 20 MG tablet  Commonly known as: LASIX  Take 1 tablet (20 mg total) by mouth 2 (two) times a day.     metoprolol succinate 25 MG 24 hr tablet  Commonly known as: TOPROL-XL  Take 1 tablet by mouth once daily.     ondansetron 4 MG tablet  Commonly known as: ZOFRAN  Take 1 tablet (4 mg total) by mouth every 6 (six) hours.     pantoprazole 40 MG tablet  Commonly known as: PROTONIX  Take 40 mg by mouth 2 (two) times a day.     PLAVIX 75 mg tablet  Generic drug: clopidogreL  Take 1 tablet by mouth once daily.     potassium chloride SA 20 MEQ tablet  Commonly known as: K-DUR,KLOR-CON  Take 1 tablet (20 mEq total) by mouth every other day.            STOP taking these medications      losartan 25 MG tablet  Commonly known as: COZAAR              Indwelling Lines/Drains at time of discharge:   Lines/Drains/Airways       Drain  Duration             Female External Urinary Catheter w/ Suction 01/10/25 0800 1 day                    Time spent on the discharge of patient: 35 minutes         Calvin Schrader MD  Department of Hospital Medicine  [facility] - Telemetry ([facility])

## 2025-01-13 ENCOUNTER — TELEPHONE (OUTPATIENT)
Dept: HOME HEALTH SERVICES | Facility: CLINIC | Age: 75
End: 2025-01-13
Payer: MEDICARE

## 2025-01-13 ENCOUNTER — TELEPHONE (OUTPATIENT)
Dept: CARDIOLOGY | Facility: CLINIC | Age: 75
End: 2025-01-13
Payer: MEDICARE

## 2025-01-13 NOTE — TELEPHONE ENCOUNTER
"Heart Failure Transitional Care Clinic(HFTCC) nurse navigator notified of HFTCC candidate in need of education and introduction to 4-6 week program.      PT aao x 3 while lying in bed  with daughter at bedside. Introduced self to pt as HFTCC nurse navigator.     Patient given "Home Care Guide for Heart Failure Patients" , "Heart Failure Transitional Care Clinic" flyer and "Daily weight and symptom tracker".  Encouraged pt and daughter to review information.      Reviewed the following key points of HFTCC program with pt and family:   1.) Take your medications as directed.    2.) Weight yourself daily   3.) Follow low salt and limited fluid diet.    4.) Stop smoking and start exercising   5.) Go to your appointments and call your team.      Pt reminded to follow Symptom tracker and to call at the onset of symptoms according to tracker.     Reviewed plan for follow up once discharged to include phone calls, in person and virtual visits to assist pt optimizing their heart failure medication regimen and encouraging healthy lifestyle modifications.  Reminded pt that program will assist them over the next 4-6 weeks and then patient will be transferred to long term care provider .  Reminded pt how to contact HFTCC navigator via phone and or via [employer].     Pt given appointment or instructed appointment will be printed on hospital discharge paperwork.     Pt also reminded HF nurse will call 48-72 hours after discharge to check on them.     PT and daughter verbalize read back of information given.  Encouraged pt and family to read over information often and contact team with any questions or concerns.    "

## 2025-01-13 NOTE — TELEPHONE ENCOUNTER
"Heart Failure Transitional Care Clinic(HFTCC) hospital discharge 48-72 hour phone follow up completed.     Most Recent Hospital Discharge Date: 1/11/25  Last admission Diagnosis/chief complaint:Acute CHF    TCC nurse Navigator spoke with Daughter    Current Patient reported weight: No scale    Current Patient reported blood pressure and heart rate: 117/68    Pt reports the following:  []  Shortness of Breath with Activity  []  Shortness of Breath at rest   []  Fatigue  []  Edema   [] Chest pain or tightness  [] Weight Increase since discharge  [x] None of the above    Medications:   Discharge medication reviewed with pt.  Pt reports having medication list available and has all medications at home for use per list.     Education:   Confirmed pt received "Home Care Guide for Heart Failure Patients" while admitted.   Reviewed key points as listed below.     Recommend 2 -3 gram sodium restriction and 1500 cc-2000 cc fluid restriction.  Encourage physical activity with graded exercise program.  Requested patient to weigh themselves daily, and to notify us if their weight increases by more than 3 lbs in 1 day or 5 lbs in 3 days.   Reminded patient to use "Daily weight and symptom tracker" to record and guide patient on when and how to call HFTCC. PT may also use symptom tracker if no scale available  Pt reports being in the green(color) Zone. If in yellow/red, reminded that they should be calling HFTCC today or now.     Watch for these Signs and Symptoms: If any of these occur, contact HFTCC immediately:   Increase in shortness of breath with movement   Increase in swelling in your legs and ankles   Weight gain of more than 3 pounds in a day or 5 pounds in 3 days.   Difficulty breathing when you are lying down   Worsening fatigue or tiredness   Stomach bloating, a full feeling or a loss of appetite   Increased coughing--especially when you are lying down      Pt was able to verbalize back to nurse in their own words " correct diet/fluid restrictions, necessity for exercise, warning signs and symptoms, when and how to contact their TCC team.      Pt educated on follow-up plan while in HFTCC program to include:   Week 1 -  F/u appt with Provider and HF nurse (Date) 1/16/25 at 1:30 pm with KWAKU Brasher.   Week 2-5 - In person/ Virtual/ phone call check ins    Week 5-7 - Pt will discharge from HFTCC and transition to longterm care provider (Cardiology/PCP/ Advanced Heart Failure).      Patient active on myChart? Yes      Pt given the following contact information for ease of communication: [phone] (Mon-Fri, 8a-5p) & for urgent issues on the weekend call [facility] on-call [phone] to page the Cardiology MD on call.  Pt also encouraged utilize [facility] messaging as well.      Will follow up with pt at first clinic visit and HF nurse navigator available for pt questions, issues or concerns.

## 2025-01-14 ENCOUNTER — TELEPHONE (OUTPATIENT)
Dept: HOME HEALTH SERVICES | Facility: CLINIC | Age: 75
End: 2025-01-14
Payer: MEDICARE

## 2025-01-17 NOTE — PHYSICIAN QUERY
Question: Please clarify the Cardiac diagnosis.    Provider Query Response:  NSTEMI/Myocardial infarction Type 2 due to (please specify): demand ischemia

## (undated) DEVICE — CATH IMPULSE 6FR MULTI-PAK

## (undated) DEVICE — CATH IMPULSE PIGTAIL 6F 110CM

## (undated) DEVICE — PACK HEART CATH BR

## (undated) DEVICE — ANGIOTOUCH KIT

## (undated) DEVICE — CATH DIAG IMPULSE 6FR FR4

## (undated) DEVICE — GUIDEWIRE EMERALD .035IN 260CM

## (undated) DEVICE — GUIDEWIRE WHOLEY HI TORQ 175CM

## (undated) DEVICE — OMNIPAQUE 300MG 150ML VIAL

## (undated) DEVICE — KIT MANIFOLD LOW PRESS TUBING

## (undated) DEVICE — CATH DIAG IMPULSE 6FR FL4

## (undated) DEVICE — CATH DIAG 6F FL3.5 100CM

## (undated) DEVICE — SHEATH INTRODUCER 6FR 11CM

## (undated) DEVICE — KIT SYR REUSABLE